# Patient Record
Sex: FEMALE | Employment: OTHER | URBAN - METROPOLITAN AREA
[De-identification: names, ages, dates, MRNs, and addresses within clinical notes are randomized per-mention and may not be internally consistent; named-entity substitution may affect disease eponyms.]

---

## 2018-10-16 ENCOUNTER — TELEPHONE (OUTPATIENT)
Dept: INFECTIOUS DISEASES | Age: 73
End: 2018-10-16

## 2018-10-16 PROBLEM — T88.51XA: Status: ACTIVE | Noted: 2017-04-12

## 2018-10-16 PROBLEM — S42.209A CLOSED FRACTURE OF PROXIMAL END OF HUMERUS: Status: ACTIVE | Noted: 2018-10-16

## 2018-10-16 PROBLEM — K57.30 DIVERTICULOSIS OF LARGE INTESTINE WITHOUT HEMORRHAGE: Status: ACTIVE | Noted: 2017-01-16

## 2018-10-16 PROBLEM — S21.119A LACERATION OF CHEST WALL: Status: ACTIVE | Noted: 2017-08-22

## 2018-10-16 PROBLEM — K57.92 DIVERTICULITIS: Status: ACTIVE | Noted: 2018-10-01

## 2018-10-16 PROBLEM — J95.821 ACUTE RESPIRATORY FAILURE FOLLOWING TRAUMA AND SURGERY (HCC): Status: ACTIVE | Noted: 2017-04-12

## 2018-10-16 PROBLEM — I34.0 NON-RHEUMATIC MITRAL REGURGITATION: Status: ACTIVE | Noted: 2017-03-07

## 2018-10-16 PROBLEM — I50.9 CHF (CONGESTIVE HEART FAILURE) (HCC): Status: ACTIVE | Noted: 2018-04-27

## 2018-10-16 PROBLEM — I34.0 MITRAL INCOMPETENCE: Status: ACTIVE | Noted: 2017-04-12

## 2018-10-17 ENCOUNTER — OFFICE VISIT (OUTPATIENT)
Dept: INFECTIOUS DISEASES | Age: 73
End: 2018-10-17
Payer: MEDICARE

## 2018-10-17 ENCOUNTER — TELEPHONE (OUTPATIENT)
Dept: INFECTIOUS DISEASES | Age: 73
End: 2018-10-17

## 2018-10-17 VITALS
WEIGHT: 275 LBS | SYSTOLIC BLOOD PRESSURE: 109 MMHG | HEART RATE: 72 BPM | OXYGEN SATURATION: 98 % | RESPIRATION RATE: 16 BRPM | DIASTOLIC BLOOD PRESSURE: 69 MMHG | BODY MASS INDEX: 48.73 KG/M2 | HEIGHT: 63 IN

## 2018-10-17 DIAGNOSIS — K57.20 COLONIC DIVERTICULAR ABSCESS: ICD-10-CM

## 2018-10-17 DIAGNOSIS — K57.92 DIVERTICULITIS: Primary | ICD-10-CM

## 2018-10-17 PROCEDURE — 99212 OFFICE O/P EST SF 10 MIN: CPT | Performed by: INTERNAL MEDICINE

## 2018-10-17 RX ORDER — CETIRIZINE HYDROCHLORIDE 10 MG/1
10 TABLET ORAL EVERY EVENING
COMMUNITY
Start: 2017-11-17

## 2018-10-17 RX ORDER — AMOXICILLIN AND CLAVULANATE POTASSIUM 500; 125 MG/1; MG/1
1 TABLET, FILM COATED ORAL
COMMUNITY
Start: 2018-10-15 | End: 2018-10-22

## 2018-10-17 RX ORDER — ATORVASTATIN CALCIUM 20 MG/1
10 TABLET, FILM COATED ORAL EVERY EVENING
COMMUNITY
Start: 2018-05-30

## 2018-10-17 RX ORDER — LEVOFLOXACIN 750 MG/1
750 TABLET ORAL
COMMUNITY
Start: 2018-10-17 | End: 2018-10-22

## 2018-10-17 RX ORDER — POLYETHYLENE GLYCOL 3350 17 G/17G
17 POWDER, FOR SOLUTION ORAL
Status: ON HOLD | COMMUNITY
End: 2021-03-01

## 2018-10-17 RX ORDER — AZELASTINE HYDROCHLORIDE, FLUTICASONE PROPIONATE 137; 50 UG/1; UG/1
1 SPRAY, METERED NASAL 2 TIMES DAILY PRN
COMMUNITY
Start: 2018-04-16

## 2018-10-17 RX ORDER — ALBUTEROL SULFATE 90 UG/1
2 AEROSOL, METERED RESPIRATORY (INHALATION) EVERY 4 HOURS PRN
COMMUNITY

## 2018-10-17 RX ORDER — ASPIRIN 325 MG
325 TABLET ORAL EVERY EVENING
COMMUNITY

## 2018-10-17 RX ORDER — BENZONATATE 200 MG/1
200 CAPSULE ORAL
COMMUNITY
End: 2021-02-05

## 2018-10-17 RX ORDER — RANITIDINE 150 MG/1
TABLET ORAL
COMMUNITY
Start: 2018-07-13 | End: 2021-02-05

## 2018-10-17 RX ORDER — SENNOSIDES 8.6 MG
650 CAPSULE ORAL EVERY 8 HOURS PRN
COMMUNITY

## 2018-10-17 RX ORDER — FUROSEMIDE 80 MG
80 TABLET ORAL
COMMUNITY
Start: 2017-10-04 | End: 2021-02-05

## 2018-10-17 RX ORDER — PANTOPRAZOLE SODIUM 40 MG/1
40 TABLET, DELAYED RELEASE ORAL DAILY
COMMUNITY
Start: 2018-10-05

## 2018-10-17 RX ORDER — BUPROPION HYDROCHLORIDE 150 MG/1
150 TABLET, EXTENDED RELEASE ORAL 2 TIMES DAILY
COMMUNITY
Start: 2018-07-30

## 2018-10-17 RX ORDER — AMOXICILLIN 250 MG
1 CAPSULE ORAL EVERY EVENING
COMMUNITY

## 2018-10-17 RX ORDER — SPIRONOLACTONE 25 MG/1
25 TABLET ORAL EVERY MORNING
COMMUNITY
Start: 2018-07-03

## 2018-10-17 RX ORDER — FLUCONAZOLE 200 MG/1
200 TABLET ORAL
COMMUNITY
Start: 2018-10-16 | End: 2018-10-22

## 2018-10-17 RX ORDER — IPRATROPIUM BROMIDE AND ALBUTEROL SULFATE 2.5; .5 MG/3ML; MG/3ML
3 SOLUTION RESPIRATORY (INHALATION) EVERY 6 HOURS PRN
COMMUNITY

## 2018-10-17 RX ORDER — SOLIFENACIN SUCCINATE 5 MG/1
5 TABLET, FILM COATED ORAL DAILY
COMMUNITY
Start: 2018-08-24

## 2018-10-17 RX ORDER — FLUTICASONE PROPIONATE 50 MCG
1 SPRAY, SUSPENSION (ML) NASAL 2 TIMES DAILY PRN
COMMUNITY
Start: 2017-07-12

## 2018-10-17 RX ORDER — NYSTATIN 100000 [USP'U]/G
POWDER TOPICAL 3 TIMES DAILY PRN
COMMUNITY
Start: 2018-05-21

## 2018-10-17 RX ORDER — AMOXICILLIN AND CLAVULANATE POTASSIUM 500; 125 MG/1; MG/1
1 TABLET, FILM COATED ORAL 2 TIMES DAILY
Qty: 20 TABLET | Refills: 0 | Status: SHIPPED | OUTPATIENT
Start: 2018-10-17 | End: 2018-10-27

## 2018-10-17 RX ORDER — ASCORBATE CALCIUM 500 MG
600 TABLET ORAL
Status: ON HOLD | COMMUNITY
End: 2021-03-01 | Stop reason: SDUPTHER

## 2018-10-17 RX ORDER — CITALOPRAM 20 MG/1
10 TABLET ORAL DAILY
COMMUNITY
Start: 2018-07-16

## 2018-10-17 RX ORDER — LEVOFLOXACIN 750 MG/1
750 TABLET ORAL EVERY OTHER DAY
Qty: 5 TABLET | Refills: 0 | Status: SHIPPED | OUTPATIENT
Start: 2018-10-17 | End: 2018-10-27

## 2018-10-17 RX ORDER — PHENOL 1.4 %
10 AEROSOL, SPRAY (ML) MUCOUS MEMBRANE NIGHTLY PRN
COMMUNITY

## 2018-10-17 NOTE — TELEPHONE ENCOUNTER
Paris Chung MD   0\"   10/17/2018 1:13 PM   Hi Dr. Kathryn Myles, Please call IR asap re: Floridalma Peterson and her drain.  Call at [phone] Thanks Rachel Acosta 10/17/2018 1:13 PM   0\"   10/17/2018 1:14 PM   Wrong number  0\"   10/17/2018 1:17 PM   [phone]  Read 10/17/2018 1:22 PM   0\"   10/17/2018 1:22 PM   Thx

## 2018-10-31 ENCOUNTER — TELEPHONE (OUTPATIENT)
Dept: INFECTIOUS DISEASES | Age: 73
End: 2018-10-31

## 2018-10-31 RX ORDER — LEVOFLOXACIN 750 MG/1
750 TABLET ORAL EVERY OTHER DAY
Qty: 4 TABLET | Refills: 0 | Status: SHIPPED | OUTPATIENT
Start: 2018-10-31 | End: 2018-11-07

## 2018-10-31 RX ORDER — AMOXICILLIN AND CLAVULANATE POTASSIUM 500; 125 MG/1; MG/1
1 TABLET, FILM COATED ORAL 2 TIMES DAILY
Qty: 14 TABLET | Refills: 0 | Status: SHIPPED | OUTPATIENT
Start: 2018-10-31 | End: 2018-11-07

## 2018-11-02 DIAGNOSIS — K57.92 DIVERTICULITIS: ICD-10-CM

## 2018-11-07 ENCOUNTER — OFFICE VISIT (OUTPATIENT)
Dept: INFECTIOUS DISEASES | Age: 73
End: 2018-11-07
Payer: MEDICARE

## 2018-11-07 VITALS
HEIGHT: 63 IN | WEIGHT: 254 LBS | TEMPERATURE: 97.1 F | BODY MASS INDEX: 45 KG/M2 | HEART RATE: 62 BPM | DIASTOLIC BLOOD PRESSURE: 74 MMHG | SYSTOLIC BLOOD PRESSURE: 121 MMHG

## 2018-11-07 DIAGNOSIS — K57.20 COLONIC DIVERTICULAR ABSCESS: Primary | ICD-10-CM

## 2018-11-07 DIAGNOSIS — K57.92 DIVERTICULITIS: ICD-10-CM

## 2018-11-07 PROCEDURE — 99213 OFFICE O/P EST LOW 20 MIN: CPT | Performed by: INTERNAL MEDICINE

## 2018-11-08 ENCOUNTER — TELEPHONE (OUTPATIENT)
Dept: INFECTIOUS DISEASES | Age: 73
End: 2018-11-08

## 2018-11-08 DIAGNOSIS — N17.9 AKI (ACUTE KIDNEY INJURY) (HCC): Primary | ICD-10-CM

## 2018-11-09 NOTE — TELEPHONE ENCOUNTER
Patient informed and she will go to the wellness center to have this test done. Order faxed to the [address] [place] [address].

## 2018-12-19 ENCOUNTER — OFFICE VISIT (OUTPATIENT)
Dept: INFECTIOUS DISEASES | Age: 73
End: 2018-12-19
Payer: MEDICARE

## 2018-12-19 VITALS
BODY MASS INDEX: 47.31 KG/M2 | HEART RATE: 60 BPM | RESPIRATION RATE: 16 BRPM | HEIGHT: 63 IN | OXYGEN SATURATION: 95 % | WEIGHT: 267 LBS | DIASTOLIC BLOOD PRESSURE: 61 MMHG | SYSTOLIC BLOOD PRESSURE: 122 MMHG | TEMPERATURE: 96.9 F

## 2018-12-19 DIAGNOSIS — N17.9 AKI (ACUTE KIDNEY INJURY) (HCC): ICD-10-CM

## 2018-12-19 DIAGNOSIS — K57.20 COLONIC DIVERTICULAR ABSCESS: Primary | ICD-10-CM

## 2018-12-19 DIAGNOSIS — K57.92 DIVERTICULITIS: ICD-10-CM

## 2018-12-19 PROCEDURE — 99212 OFFICE O/P EST SF 10 MIN: CPT | Performed by: INTERNAL MEDICINE

## 2018-12-19 NOTE — PROGRESS NOTES
Diverticulosis of large intestine without hemorrhage 1/16/2017    Encounter for mammogram to establish baseline mammogram 7/21/2016    Essential hypertension 6/15/2016    H/O hysterectomy for benign disease 3/3/2013    Heart murmur 7/21/2016    Hyperlipidemia 6/15/2016    Hypothermia due to anesthesia 4/12/2017    Laceration of chest wall 8/22/2017    Mitral incompetence 4/12/2017    Morbid obesity ([address] 6/15/2016    Non-rheumatic mitral regurgitation 3/7/2017    Polyp of sigmoid colon 6/15/2016    Postmenopausal status 6/15/2016    Routine general medical examination at a health care facility 7/21/2016    Special screening for malignant neoplasms, colon 7/21/2016    Special screening for malignant neoplasms, vagina 7/21/2016    Urinary incontinence 6/15/2016     History reviewed. No pertinent surgical history. Social History     Social History    Marital status:      Spouse name: N/A    Number of children: N/A    Years of education: N/A     Social History Main Topics    Smoking status: Never Smoker    Smokeless tobacco: Never Used    Alcohol use None    Drug use: Unknown    Sexual activity: Not Asked     Other Topics Concern    None     Social History Narrative    None     History reviewed. No pertinent family history.     Current med     Current Outpatient Prescriptions:     acetaminophen (TYLENOL) 650 MG extended release tablet, Take 650 mg by mouth, Disp: , Rfl:     albuterol sulfate  (90 Base) MCG/ACT inhaler, Inhale 2 puffs into the lungs, Disp: , Rfl:     Calcium Ascorbate 500 MG TABS, Take 500 mg by mouth, Disp: , Rfl:     aspirin 325 MG tablet, Take 325 mg by mouth, Disp: , Rfl:     atorvastatin (LIPITOR) 20 MG tablet, Take 20 mg by mouth, Disp: , Rfl:     Azelastine-Fluticasone 137-50 MCG/ACT SUSP, 1 spray by Nasal route, Disp: , Rfl:     benzonatate (TESSALON) 200 MG capsule, Take 200 mg by mouth, Disp: , Rfl:     buPROPion (WELLBUTRIN SR) 150 MG extended

## 2018-12-20 ENCOUNTER — TELEPHONE (OUTPATIENT)
Dept: INFECTIOUS DISEASES | Age: 73
End: 2018-12-20

## 2019-02-13 ENCOUNTER — OFFICE VISIT (OUTPATIENT)
Dept: INFECTIOUS DISEASES | Age: 74
End: 2019-02-13
Payer: MEDICARE

## 2019-02-13 VITALS
TEMPERATURE: 97 F | DIASTOLIC BLOOD PRESSURE: 70 MMHG | BODY MASS INDEX: 49.61 KG/M2 | HEIGHT: 63 IN | SYSTOLIC BLOOD PRESSURE: 130 MMHG | WEIGHT: 280 LBS | HEART RATE: 68 BPM

## 2019-02-13 DIAGNOSIS — K57.92 DIVERTICULITIS: Primary | ICD-10-CM

## 2019-02-13 PROCEDURE — 99212 OFFICE O/P EST SF 10 MIN: CPT | Performed by: INTERNAL MEDICINE

## 2019-02-15 DIAGNOSIS — K57.20 COLONIC DIVERTICULAR ABSCESS: ICD-10-CM

## 2021-02-05 ENCOUNTER — HOSPITAL ENCOUNTER (OUTPATIENT)
Dept: PREADMISSION TESTING | Age: 76
Discharge: HOME | End: 2021-02-09
Payer: MEDICARE

## 2021-02-05 VITALS
RESPIRATION RATE: 18 BRPM | HEIGHT: 62 IN | OXYGEN SATURATION: 97 % | HEART RATE: 74 BPM | DIASTOLIC BLOOD PRESSURE: 73 MMHG | SYSTOLIC BLOOD PRESSURE: 125 MMHG | TEMPERATURE: 96.5 F | WEIGHT: 293 LBS | BODY MASS INDEX: 53.92 KG/M2

## 2021-02-05 DIAGNOSIS — Z01.811 PRE-OP CHEST EXAM: Primary | ICD-10-CM

## 2021-02-05 PROCEDURE — 93005 ELECTROCARDIOGRAM TRACING: CPT | Performed by: PSYCHIATRY & NEUROLOGY

## 2021-02-05 RX ORDER — MAGNESIUM GLUCONATE 27 MG(500)
500 TABLET ORAL 2 TIMES DAILY
Status: ON HOLD | COMMUNITY
End: 2021-03-01

## 2021-02-05 RX ORDER — TORSEMIDE 20 MG/1
60 TABLET ORAL DAILY
Status: ON HOLD | COMMUNITY
End: 2021-03-12 | Stop reason: HOSPADM

## 2021-02-05 RX ORDER — AMIODARONE HYDROCHLORIDE 200 MG/1
100 TABLET ORAL
Status: ON HOLD | COMMUNITY
End: 2021-03-12 | Stop reason: HOSPADM

## 2021-02-05 NOTE — PRE-PROCEDURE INSTRUCTIONS
ARRIVE AT [facility] ON Thursday, 2/25/2021 at 0830 AM    Please call [phone] when you arrive at the main entrance. Please see visitation restrictions handout that was given to you. Continue to take your home medications as you normally do up to and including the night before surgery with the exception of any blood thinning medications. Please stop any blood thinning medications as directed by your surgeon or prescribing physician. Failure to stop certain medications may interfere with your scheduled surgery. These may include:  Aspirin, Warfarin (Coumadin), Clopidogrel (Plavix), Ibuprofen (Motrin, Advil), Naproxen (Aleve), Meloxicam (Mobic), Celecoxib (Celebrex), Eliquis, Pradaxa, Xarelto, Effient, Fish Oil, Herbal supplements. Please take the following medication(s) the day of surgery with a small sip of water:  Amiodarone, Inhalers, Tylenol, Metoprolol, Protonix, Nasal spray  Zyrtec    Please use your inhalers at home the day of surgery. PREPARING FOR YOUR SURGERY:     Before surgery, you can play an important role in your own health. Because skin is not sterile, we need to be sure that your skin is as free of germs as possible before surgery by carefully washing before surgery. Preparing or prepping skin before surgery can reduce the risk of a surgical site infection.   Do not shave the area of your body where your surgery will be performed unless you received specific permission from your physician. You will need to shower at home the night before surgery and the morning of surgery with a special soap called chlorhexidine gluconate (CHG*). *Not to be used by people allergic to Chlorhexidine Gluconate (CHG). Following these instructions will help you be sure that your skin is clean before surgery. Instructions on cleaning your skin before surgery: The night before your surgery:      You will need to shower with warm water (not hot) and the CHG soap.        Use a clean wash cloth and a clean towel. Have clean clothes available to put on after the shower.    First wash your hair with regular shampoo. Rinse your hair and body thoroughly to remove the shampoo.  Wash your face and genital area (private parts) with your regular soap or water only. Thoroughly rinse your body with warm water from the neck down.  Turn water off to prevent rinsing the soap off too soon.  With a clean wet washcloth and half of the CHG soap in the bottle, lather your entire body from the neck down. Do not use CHG soap near your eyes or ears to avoid injury to those areas.  Wash thoroughly, paying special attention to the area where your surgery will be performed.  Wash your body gently for five (5) minutes. Avoid scrubbing your skin too hard.  Turn the water back on and rinse your body thoroughly.  Pat yourself dry with a clean, soft towel. Do not apply lotion, cream or powder.  Dress with clean freshly washed clothes. The morning of surgery:     Repeat shower following steps above - using remaining half of CHG soap in bottle. Patient Instructions:    Antony Beltran If you are having any type of anesthesia you are to have nothing to eat or drink after midnight the night before your surgery. This includes gum, hard candy, mints, water or smoking or chewing tobacco.  The only exception to this is a small sip of water to take with any morning dose of heart, blood pressure, or seizure medications. No alcoholic beverages for 24 hours prior to surgery.  Brush your teeth but do not swallow water.  Bring your eyeglasses and case with you. No contacts are to be worn the day of surgery. You also may bring your hearing aids. Most surgical procedures involving anesthesia will require that you remove your dentures prior to surgery.      If you are on C-PAP or Bi-PAP at home and plan on staying in the hospital overnight for your surgery please bring the machine with you. · Do not wear any jewelry or body piercings day of surgery. Also, NO lotion, perfume or deodorant to be used the day of surgery. No nail polish on the operative extremity (arm/leg surgeries)    · Do not bring any valuables such as jewelry, cash, or credit cards. If you are staying overnight with us, please bring a small bag of personal items.  Please wear loose, comfortable clothing. If you are potentially going to have a cast or brace bring clothing that will fit over them.  In case of illness  If you have cold or flu like symptoms (high fever, runny nose, sore throat, cough, etc.) rash, nausea, vomiting, loose stools, and/or recent contact with someone who has a contagious disease (chicken pox, measles, etc.) Please call your doctor before coming to the hospital.     If your child is having surgery please make arrangements for any other children to be cared for at home on the day of surgery. Other children are not permitted in recovery room and we want you to be able to spend time with the patient. If other arrangements are not available then we suggest that you have a second adult to stay in the waiting room. Day of Surgery/Procedure:    As a patient at [facility] you can expect quality medical and nursing care that is centered on your individual needs. Our goal is to make your surgical experience as comfortable as possible    . Transportation After Your Surgery/Procedure: You will need a friend or family member to drive you home after your procedure. Your  must be 25years of age or older and able to sign off on your discharge instructions. A taxi cab or any other form of public transportation is not acceptable. Your friend or family member must stay at the hospital throughout your procedure.     Someone must remain with you for the first 24 hours after your surgery if you receive anesthesia or medication. If you do not have someone to stay with you, your procedure may be cancelled.       If you have any other questions regarding your procedure or the day of surgery, please call [phone]      _________________________  ____________________________  Signature (Patient)              Signature (Provider) & date

## 2021-02-06 LAB
EKG ATRIAL RATE: 68 BPM
EKG P AXIS: 73 DEGREES
EKG P-R INTERVAL: 186 MS
EKG Q-T INTERVAL: 414 MS
EKG QRS DURATION: 96 MS
EKG QTC CALCULATION (BAZETT): 440 MS
EKG R AXIS: 2 DEGREES
EKG T AXIS: 57 DEGREES
EKG VENTRICULAR RATE: 68 BPM

## 2021-02-06 PROCEDURE — 93010 ELECTROCARDIOGRAM REPORT: CPT | Performed by: INTERNAL MEDICINE

## 2021-02-21 ENCOUNTER — HOSPITAL ENCOUNTER (OUTPATIENT)
Dept: LAB | Age: 76
Setting detail: SPECIMEN
Discharge: HOME | End: 2021-02-21
Payer: MEDICARE

## 2021-02-21 DIAGNOSIS — Z01.818 PREOP TESTING: Primary | ICD-10-CM

## 2021-02-21 PROCEDURE — U0005 INFEC AGEN DETEC AMPLI PROBE: HCPCS

## 2021-02-21 PROCEDURE — U0003 INFECTIOUS AGENT DETECTION BY NUCLEIC ACID (DNA OR RNA); SEVERE ACUTE RESPIRATORY SYNDROME CORONAVIRUS 2 (SARS-COV-2) (CORONAVIRUS DISEASE [COVID-19]), AMPLIFIED PROBE TECHNIQUE, MAKING USE OF HIGH THROUGHPUT TECHNOLOGIES AS DESCRIBED BY CMS-2020-01-R: HCPCS

## 2021-02-22 LAB
SARS-COV-2: NORMAL
SARS-COV-2: NOT DETECTED
SOURCE: NORMAL

## 2021-02-23 ENCOUNTER — TELEPHONE (OUTPATIENT)
Dept: PRIMARY CARE CLINIC | Age: 76
End: 2021-02-23

## 2021-02-24 ENCOUNTER — ANESTHESIA EVENT (OUTPATIENT)
Dept: OPERATING ROOM | Age: 76
DRG: 981 | End: 2021-02-24
Payer: MEDICARE

## 2021-02-25 ENCOUNTER — ANESTHESIA (OUTPATIENT)
Dept: OPERATING ROOM | Age: 76
DRG: 981 | End: 2021-02-25
Payer: MEDICARE

## 2021-02-25 ENCOUNTER — HOSPITAL ENCOUNTER (INPATIENT)
Age: 76
LOS: 15 days | Discharge: NURSING, REHAB OR LONG-TERM CARE | DRG: 981 | End: 2021-03-12
Attending: COLON & RECTAL SURGERY | Admitting: COLON & RECTAL SURGERY
Payer: MEDICARE

## 2021-02-25 VITALS — DIASTOLIC BLOOD PRESSURE: 71 MMHG | TEMPERATURE: 56.3 F | SYSTOLIC BLOOD PRESSURE: 166 MMHG | OXYGEN SATURATION: 100 %

## 2021-02-25 PROBLEM — Z90.49 S/P COLECTOMY: Status: ACTIVE | Noted: 2021-02-25

## 2021-02-25 LAB
GLUCOSE BLD-MCNC: 103 MG/DL (ref 65–105)
GLUCOSE BLD-MCNC: 150 MG/DL (ref 65–105)

## 2021-02-25 PROCEDURE — 3700000000 HC ANESTHESIA ATTENDED CARE: Performed by: COLON & RECTAL SURGERY

## 2021-02-25 PROCEDURE — 6370000000 HC RX 637 (ALT 250 FOR IP): Performed by: STUDENT IN AN ORGANIZED HEALTH CARE EDUCATION/TRAINING PROGRAM

## 2021-02-25 PROCEDURE — 0DNN8ZZ RELEASE SIGMOID COLON, VIA NATURAL OR ARTIFICIAL OPENING ENDOSCOPIC: ICD-10-PCS | Performed by: COLON & RECTAL SURGERY

## 2021-02-25 PROCEDURE — 94761 N-INVAS EAR/PLS OXIMETRY MLT: CPT

## 2021-02-25 PROCEDURE — 3600000002 HC SURGERY LEVEL 2 BASE: Performed by: COLON & RECTAL SURGERY

## 2021-02-25 PROCEDURE — 2500000003 HC RX 250 WO HCPCS: Performed by: NURSE ANESTHETIST, CERTIFIED REGISTERED

## 2021-02-25 PROCEDURE — 2580000003 HC RX 258: Performed by: NURSE ANESTHETIST, CERTIFIED REGISTERED

## 2021-02-25 PROCEDURE — 2709999900 HC NON-CHARGEABLE SUPPLY: Performed by: COLON & RECTAL SURGERY

## 2021-02-25 PROCEDURE — 3700000001 HC ADD 15 MINUTES (ANESTHESIA): Performed by: COLON & RECTAL SURGERY

## 2021-02-25 PROCEDURE — 6360000002 HC RX W HCPCS: Performed by: ANESTHESIOLOGY

## 2021-02-25 PROCEDURE — 0D1N0Z4 BYPASS SIGMOID COLON TO CUTANEOUS, OPEN APPROACH: ICD-10-PCS | Performed by: COLON & RECTAL SURGERY

## 2021-02-25 PROCEDURE — 2580000003 HC RX 258: Performed by: ANESTHESIOLOGY

## 2021-02-25 PROCEDURE — 0DBN8ZZ EXCISION OF SIGMOID COLON, VIA NATURAL OR ARTIFICIAL OPENING ENDOSCOPIC: ICD-10-PCS | Performed by: COLON & RECTAL SURGERY

## 2021-02-25 PROCEDURE — 82947 ASSAY GLUCOSE BLOOD QUANT: CPT

## 2021-02-25 PROCEDURE — 7100000000 HC PACU RECOVERY - FIRST 15 MIN: Performed by: COLON & RECTAL SURGERY

## 2021-02-25 PROCEDURE — 6360000002 HC RX W HCPCS: Performed by: NURSE ANESTHETIST, CERTIFIED REGISTERED

## 2021-02-25 PROCEDURE — 2580000003 HC RX 258: Performed by: STUDENT IN AN ORGANIZED HEALTH CARE EDUCATION/TRAINING PROGRAM

## 2021-02-25 PROCEDURE — 94640 AIRWAY INHALATION TREATMENT: CPT

## 2021-02-25 PROCEDURE — 3600000012 HC SURGERY LEVEL 2 ADDTL 15MIN: Performed by: COLON & RECTAL SURGERY

## 2021-02-25 PROCEDURE — 88307 TISSUE EXAM BY PATHOLOGIST: CPT

## 2021-02-25 PROCEDURE — 2500000003 HC RX 250 WO HCPCS

## 2021-02-25 PROCEDURE — 2000000000 HC ICU R&B

## 2021-02-25 PROCEDURE — 7100000001 HC PACU RECOVERY - ADDTL 15 MIN: Performed by: COLON & RECTAL SURGERY

## 2021-02-25 PROCEDURE — 2720000010 HC SURG SUPPLY STERILE: Performed by: COLON & RECTAL SURGERY

## 2021-02-25 PROCEDURE — 2700000000 HC OXYGEN THERAPY PER DAY

## 2021-02-25 PROCEDURE — 6360000002 HC RX W HCPCS: Performed by: STUDENT IN AN ORGANIZED HEALTH CARE EDUCATION/TRAINING PROGRAM

## 2021-02-25 PROCEDURE — 99211 OFF/OP EST MAY X REQ PHY/QHP: CPT

## 2021-02-25 RX ORDER — SODIUM CHLORIDE, SODIUM LACTATE, POTASSIUM CHLORIDE, CALCIUM CHLORIDE 600; 310; 30; 20 MG/100ML; MG/100ML; MG/100ML; MG/100ML
INJECTION, SOLUTION INTRAVENOUS CONTINUOUS PRN
Status: DISCONTINUED | OUTPATIENT
Start: 2021-02-25 | End: 2021-02-25 | Stop reason: SDUPTHER

## 2021-02-25 RX ORDER — MORPHINE SULFATE 2 MG/ML
2 INJECTION, SOLUTION INTRAMUSCULAR; INTRAVENOUS EVERY 5 MIN PRN
Status: DISCONTINUED | OUTPATIENT
Start: 2021-02-25 | End: 2021-02-25 | Stop reason: HOSPADM

## 2021-02-25 RX ORDER — OXYCODONE HYDROCHLORIDE AND ACETAMINOPHEN 5; 325 MG/1; MG/1
2 TABLET ORAL PRN
Status: DISCONTINUED | OUTPATIENT
Start: 2021-02-25 | End: 2021-02-25 | Stop reason: HOSPADM

## 2021-02-25 RX ORDER — SODIUM CHLORIDE 0.9 % (FLUSH) 0.9 %
10 SYRINGE (ML) INJECTION PRN
Status: DISCONTINUED | OUTPATIENT
Start: 2021-02-25 | End: 2021-02-25 | Stop reason: HOSPADM

## 2021-02-25 RX ORDER — ALBUTEROL SULFATE 2.5 MG/3ML
2.5 SOLUTION RESPIRATORY (INHALATION)
Status: DISCONTINUED | OUTPATIENT
Start: 2021-02-25 | End: 2021-03-08

## 2021-02-25 RX ORDER — GLYCOPYRROLATE 1 MG/5 ML
SYRINGE (ML) INTRAVENOUS PRN
Status: DISCONTINUED | OUTPATIENT
Start: 2021-02-25 | End: 2021-02-25 | Stop reason: SDUPTHER

## 2021-02-25 RX ORDER — NEOSTIGMINE METHYLSULFATE 5 MG/5 ML
SYRINGE (ML) INTRAVENOUS PRN
Status: DISCONTINUED | OUTPATIENT
Start: 2021-02-25 | End: 2021-02-25 | Stop reason: SDUPTHER

## 2021-02-25 RX ORDER — HYDROMORPHONE HCL 110MG/55ML
0.5 PATIENT CONTROLLED ANALGESIA SYRINGE INTRAVENOUS EVERY 5 MIN PRN
Status: DISCONTINUED | OUTPATIENT
Start: 2021-02-25 | End: 2021-02-25 | Stop reason: HOSPADM

## 2021-02-25 RX ORDER — FENTANYL CITRATE 50 UG/ML
INJECTION, SOLUTION INTRAMUSCULAR; INTRAVENOUS PRN
Status: DISCONTINUED | OUTPATIENT
Start: 2021-02-25 | End: 2021-02-25 | Stop reason: SDUPTHER

## 2021-02-25 RX ORDER — CITALOPRAM 10 MG/1
10 TABLET ORAL DAILY
Status: DISCONTINUED | OUTPATIENT
Start: 2021-02-26 | End: 2021-03-12 | Stop reason: HOSPADM

## 2021-02-25 RX ORDER — LIDOCAINE HYDROCHLORIDE 10 MG/ML
1 INJECTION, SOLUTION EPIDURAL; INFILTRATION; INTRACAUDAL; PERINEURAL
Status: DISCONTINUED | OUTPATIENT
Start: 2021-02-26 | End: 2021-02-25 | Stop reason: HOSPADM

## 2021-02-25 RX ORDER — ATORVASTATIN CALCIUM 20 MG/1
20 TABLET, FILM COATED ORAL DAILY
Status: DISCONTINUED | OUTPATIENT
Start: 2021-02-25 | End: 2021-03-12 | Stop reason: HOSPADM

## 2021-02-25 RX ORDER — HYDROMORPHONE HCL 110MG/55ML
0.25 PATIENT CONTROLLED ANALGESIA SYRINGE INTRAVENOUS EVERY 5 MIN PRN
Status: DISCONTINUED | OUTPATIENT
Start: 2021-02-25 | End: 2021-02-25 | Stop reason: HOSPADM

## 2021-02-25 RX ORDER — TORSEMIDE 20 MG/1
20 TABLET ORAL DAILY
Status: DISCONTINUED | OUTPATIENT
Start: 2021-02-26 | End: 2021-03-12 | Stop reason: HOSPADM

## 2021-02-25 RX ORDER — PROPOFOL 10 MG/ML
INJECTION, EMULSION INTRAVENOUS PRN
Status: DISCONTINUED | OUTPATIENT
Start: 2021-02-25 | End: 2021-02-25 | Stop reason: SDUPTHER

## 2021-02-25 RX ORDER — LIDOCAINE HYDROCHLORIDE 20 MG/ML
INJECTION, SOLUTION EPIDURAL; INFILTRATION; INTRACAUDAL; PERINEURAL PRN
Status: DISCONTINUED | OUTPATIENT
Start: 2021-02-25 | End: 2021-02-25 | Stop reason: SDUPTHER

## 2021-02-25 RX ORDER — SODIUM CHLORIDE, SODIUM LACTATE, POTASSIUM CHLORIDE, CALCIUM CHLORIDE 600; 310; 30; 20 MG/100ML; MG/100ML; MG/100ML; MG/100ML
INJECTION, SOLUTION INTRAVENOUS CONTINUOUS
Status: DISCONTINUED | OUTPATIENT
Start: 2021-02-25 | End: 2021-02-26

## 2021-02-25 RX ORDER — DIPHENHYDRAMINE HYDROCHLORIDE 50 MG/ML
12.5 INJECTION INTRAMUSCULAR; INTRAVENOUS
Status: DISCONTINUED | OUTPATIENT
Start: 2021-02-25 | End: 2021-02-25 | Stop reason: HOSPADM

## 2021-02-25 RX ORDER — HYDROMORPHONE HCL 110MG/55ML
0.5 PATIENT CONTROLLED ANALGESIA SYRINGE INTRAVENOUS EVERY 5 MIN PRN
Status: COMPLETED | OUTPATIENT
Start: 2021-02-25 | End: 2021-02-25

## 2021-02-25 RX ORDER — MEPERIDINE HYDROCHLORIDE 50 MG/ML
12.5 INJECTION INTRAMUSCULAR; INTRAVENOUS; SUBCUTANEOUS EVERY 5 MIN PRN
Status: DISCONTINUED | OUTPATIENT
Start: 2021-02-25 | End: 2021-02-25 | Stop reason: HOSPADM

## 2021-02-25 RX ORDER — SODIUM CHLORIDE, SODIUM LACTATE, POTASSIUM CHLORIDE, CALCIUM CHLORIDE 600; 310; 30; 20 MG/100ML; MG/100ML; MG/100ML; MG/100ML
INJECTION, SOLUTION INTRAVENOUS CONTINUOUS
Status: DISCONTINUED | OUTPATIENT
Start: 2021-02-26 | End: 2021-02-25

## 2021-02-25 RX ORDER — 0.9 % SODIUM CHLORIDE 0.9 %
500 INTRAVENOUS SOLUTION INTRAVENOUS
Status: DISCONTINUED | OUTPATIENT
Start: 2021-02-25 | End: 2021-02-25 | Stop reason: HOSPADM

## 2021-02-25 RX ORDER — SODIUM CHLORIDE 0.9 % (FLUSH) 0.9 %
10 SYRINGE (ML) INJECTION PRN
Status: DISCONTINUED | OUTPATIENT
Start: 2021-02-25 | End: 2021-03-12 | Stop reason: HOSPADM

## 2021-02-25 RX ORDER — CLINDAMYCIN PHOSPHATE 900 MG/50ML
INJECTION INTRAVENOUS PRN
Status: DISCONTINUED | OUTPATIENT
Start: 2021-02-25 | End: 2021-02-25 | Stop reason: SDUPTHER

## 2021-02-25 RX ORDER — SODIUM CHLORIDE 0.9 % (FLUSH) 0.9 %
10 SYRINGE (ML) INJECTION EVERY 12 HOURS SCHEDULED
Status: DISCONTINUED | OUTPATIENT
Start: 2021-02-25 | End: 2021-03-12 | Stop reason: HOSPADM

## 2021-02-25 RX ORDER — OXYCODONE HYDROCHLORIDE 5 MG/1
5 TABLET ORAL EVERY 4 HOURS PRN
Status: DISCONTINUED | OUTPATIENT
Start: 2021-02-25 | End: 2021-03-01

## 2021-02-25 RX ORDER — ONDANSETRON 2 MG/ML
4 INJECTION INTRAMUSCULAR; INTRAVENOUS
Status: DISCONTINUED | OUTPATIENT
Start: 2021-02-25 | End: 2021-02-25 | Stop reason: HOSPADM

## 2021-02-25 RX ORDER — ONDANSETRON 2 MG/ML
4 INJECTION INTRAMUSCULAR; INTRAVENOUS EVERY 6 HOURS PRN
Status: DISCONTINUED | OUTPATIENT
Start: 2021-02-25 | End: 2021-03-12 | Stop reason: HOSPADM

## 2021-02-25 RX ORDER — MORPHINE SULFATE 2 MG/ML
2 INJECTION, SOLUTION INTRAMUSCULAR; INTRAVENOUS EVERY 4 HOURS PRN
Status: DISCONTINUED | OUTPATIENT
Start: 2021-02-25 | End: 2021-02-28

## 2021-02-25 RX ORDER — SPIRONOLACTONE 25 MG/1
25 TABLET ORAL DAILY
Status: DISCONTINUED | OUTPATIENT
Start: 2021-02-26 | End: 2021-03-12 | Stop reason: HOSPADM

## 2021-02-25 RX ORDER — SODIUM CHLORIDE 9 MG/ML
INJECTION, SOLUTION INTRAVENOUS CONTINUOUS
Status: DISCONTINUED | OUTPATIENT
Start: 2021-02-26 | End: 2021-02-25

## 2021-02-25 RX ORDER — PROMETHAZINE HYDROCHLORIDE 25 MG/ML
12.5 INJECTION, SOLUTION INTRAMUSCULAR; INTRAVENOUS
Status: DISCONTINUED | OUTPATIENT
Start: 2021-02-25 | End: 2021-02-25 | Stop reason: HOSPADM

## 2021-02-25 RX ORDER — BUPROPION HYDROCHLORIDE 150 MG/1
150 TABLET, EXTENDED RELEASE ORAL DAILY
Status: DISCONTINUED | OUTPATIENT
Start: 2021-02-26 | End: 2021-03-12 | Stop reason: HOSPADM

## 2021-02-25 RX ORDER — IBUPROFEN 200 MG
400 TABLET ORAL EVERY 6 HOURS SCHEDULED
Status: DISCONTINUED | OUTPATIENT
Start: 2021-02-25 | End: 2021-03-01

## 2021-02-25 RX ORDER — SODIUM CHLORIDE 0.9 % (FLUSH) 0.9 %
10 SYRINGE (ML) INJECTION EVERY 12 HOURS SCHEDULED
Status: DISCONTINUED | OUTPATIENT
Start: 2021-02-25 | End: 2021-02-25 | Stop reason: HOSPADM

## 2021-02-25 RX ORDER — LABETALOL HYDROCHLORIDE 5 MG/ML
5 INJECTION, SOLUTION INTRAVENOUS EVERY 10 MIN PRN
Status: DISCONTINUED | OUTPATIENT
Start: 2021-02-25 | End: 2021-02-25 | Stop reason: HOSPADM

## 2021-02-25 RX ORDER — DEXAMETHASONE SODIUM PHOSPHATE 10 MG/ML
INJECTION, SOLUTION INTRAMUSCULAR; INTRAVENOUS PRN
Status: DISCONTINUED | OUTPATIENT
Start: 2021-02-25 | End: 2021-02-25 | Stop reason: SDUPTHER

## 2021-02-25 RX ORDER — ROCURONIUM BROMIDE 10 MG/ML
INJECTION, SOLUTION INTRAVENOUS PRN
Status: DISCONTINUED | OUTPATIENT
Start: 2021-02-25 | End: 2021-02-25 | Stop reason: SDUPTHER

## 2021-02-25 RX ORDER — ACETAMINOPHEN 500 MG
1000 TABLET ORAL EVERY 8 HOURS
Status: DISCONTINUED | OUTPATIENT
Start: 2021-02-25 | End: 2021-03-01

## 2021-02-25 RX ORDER — PANTOPRAZOLE SODIUM 40 MG/1
40 TABLET, DELAYED RELEASE ORAL
Status: DISCONTINUED | OUTPATIENT
Start: 2021-02-26 | End: 2021-02-28

## 2021-02-25 RX ORDER — IPRATROPIUM BROMIDE AND ALBUTEROL SULFATE 2.5; .5 MG/3ML; MG/3ML
1 SOLUTION RESPIRATORY (INHALATION) EVERY 4 HOURS PRN
Status: DISCONTINUED | OUTPATIENT
Start: 2021-02-25 | End: 2021-02-25

## 2021-02-25 RX ORDER — IPRATROPIUM BROMIDE AND ALBUTEROL SULFATE 2.5; .5 MG/3ML; MG/3ML
1 SOLUTION RESPIRATORY (INHALATION) 3 TIMES DAILY
Status: DISCONTINUED | OUTPATIENT
Start: 2021-02-25 | End: 2021-03-01

## 2021-02-25 RX ORDER — OXYCODONE HYDROCHLORIDE AND ACETAMINOPHEN 5; 325 MG/1; MG/1
1 TABLET ORAL PRN
Status: DISCONTINUED | OUTPATIENT
Start: 2021-02-25 | End: 2021-02-25 | Stop reason: HOSPADM

## 2021-02-25 RX ADMIN — IBUPROFEN 400 MG: 200 TABLET, FILM COATED ORAL at 16:29

## 2021-02-25 RX ADMIN — SODIUM CHLORIDE, POTASSIUM CHLORIDE, SODIUM LACTATE AND CALCIUM CHLORIDE: 600; 310; 30; 20 INJECTION, SOLUTION INTRAVENOUS at 07:04

## 2021-02-25 RX ADMIN — LIDOCAINE HYDROCHLORIDE 40 MG: 20 INJECTION, SOLUTION EPIDURAL; INFILTRATION; INTRACAUDAL; PERINEURAL at 08:01

## 2021-02-25 RX ADMIN — ROCURONIUM BROMIDE 50 MG: 10 INJECTION, SOLUTION INTRAVENOUS at 08:01

## 2021-02-25 RX ADMIN — Medication 1 MG: at 10:48

## 2021-02-25 RX ADMIN — FENTANYL CITRATE 50 MCG: 50 INJECTION, SOLUTION INTRAMUSCULAR; INTRAVENOUS at 11:20

## 2021-02-25 RX ADMIN — FENTANYL CITRATE 100 MCG: 50 INJECTION, SOLUTION INTRAMUSCULAR; INTRAVENOUS at 08:45

## 2021-02-25 RX ADMIN — ACETAMINOPHEN 1000 MG: 500 TABLET, COATED ORAL at 23:22

## 2021-02-25 RX ADMIN — Medication 5 MG: at 10:47

## 2021-02-25 RX ADMIN — MORPHINE SULFATE 2 MG: 2 INJECTION, SOLUTION INTRAMUSCULAR; INTRAVENOUS at 14:26

## 2021-02-25 RX ADMIN — PROPOFOL 30 MG: 10 INJECTION, EMULSION INTRAVENOUS at 07:42

## 2021-02-25 RX ADMIN — METRONIDAZOLE 500 MG: 500 INJECTION, SOLUTION INTRAVENOUS at 08:07

## 2021-02-25 RX ADMIN — PROPOFOL 50 MG: 10 INJECTION, EMULSION INTRAVENOUS at 08:48

## 2021-02-25 RX ADMIN — SODIUM CHLORIDE, POTASSIUM CHLORIDE, SODIUM LACTATE AND CALCIUM CHLORIDE: 600; 310; 30; 20 INJECTION, SOLUTION INTRAVENOUS at 07:31

## 2021-02-25 RX ADMIN — ATORVASTATIN CALCIUM 20 MG: 20 TABLET, FILM COATED ORAL at 16:25

## 2021-02-25 RX ADMIN — HYDROMORPHONE HYDROCHLORIDE 0.5 MG: 2 INJECTION INTRAMUSCULAR; INTRAVENOUS; SUBCUTANEOUS at 12:58

## 2021-02-25 RX ADMIN — DEXAMETHASONE SODIUM PHOSPHATE 10 MG: 10 INJECTION INTRAMUSCULAR; INTRAVENOUS at 08:44

## 2021-02-25 RX ADMIN — PROPOFOL 150 MG: 10 INJECTION, EMULSION INTRAVENOUS at 08:01

## 2021-02-25 RX ADMIN — ROCURONIUM BROMIDE 10 MG: 10 INJECTION, SOLUTION INTRAVENOUS at 09:28

## 2021-02-25 RX ADMIN — FENTANYL CITRATE 50 MCG: 50 INJECTION, SOLUTION INTRAMUSCULAR; INTRAVENOUS at 08:34

## 2021-02-25 RX ADMIN — SODIUM CHLORIDE, PRESERVATIVE FREE 10 ML: 5 INJECTION INTRAVENOUS at 20:01

## 2021-02-25 RX ADMIN — FENTANYL CITRATE 100 MCG: 50 INJECTION, SOLUTION INTRAMUSCULAR; INTRAVENOUS at 08:01

## 2021-02-25 RX ADMIN — LIDOCAINE HYDROCHLORIDE 60 MG: 20 INJECTION, SOLUTION EPIDURAL; INFILTRATION; INTRACAUDAL; PERINEURAL at 07:36

## 2021-02-25 RX ADMIN — IPRATROPIUM BROMIDE AND ALBUTEROL SULFATE 1 AMPULE: .5; 3 SOLUTION RESPIRATORY (INHALATION) at 19:47

## 2021-02-25 RX ADMIN — IPRATROPIUM BROMIDE AND ALBUTEROL SULFATE 1 AMPULE: .5; 3 SOLUTION RESPIRATORY (INHALATION) at 17:00

## 2021-02-25 RX ADMIN — SUGAMMADEX 200 MG: 100 INJECTION, SOLUTION INTRAVENOUS at 11:15

## 2021-02-25 RX ADMIN — PROPOFOL 70 MG: 10 INJECTION, EMULSION INTRAVENOUS at 07:36

## 2021-02-25 RX ADMIN — IBUPROFEN 400 MG: 200 TABLET, FILM COATED ORAL at 22:00

## 2021-02-25 RX ADMIN — SODIUM CHLORIDE, POTASSIUM CHLORIDE, SODIUM LACTATE AND CALCIUM CHLORIDE: 600; 310; 30; 20 INJECTION, SOLUTION INTRAVENOUS at 08:09

## 2021-02-25 RX ADMIN — ROCURONIUM BROMIDE 20 MG: 10 INJECTION, SOLUTION INTRAVENOUS at 08:45

## 2021-02-25 RX ADMIN — PROPOFOL 30 MG: 10 INJECTION, EMULSION INTRAVENOUS at 07:39

## 2021-02-25 RX ADMIN — SODIUM CHLORIDE, POTASSIUM CHLORIDE, SODIUM LACTATE AND CALCIUM CHLORIDE: 600; 310; 30; 20 INJECTION, SOLUTION INTRAVENOUS at 14:30

## 2021-02-25 RX ADMIN — CLINDAMYCIN PHOSPHATE 900 MG: 18 INJECTION, SOLUTION INTRAMUSCULAR; INTRAVENOUS at 08:22

## 2021-02-25 RX ADMIN — ACETAMINOPHEN 1000 MG: 500 TABLET, COATED ORAL at 16:25

## 2021-02-25 RX ADMIN — HYDROMORPHONE HYDROCHLORIDE 0.5 MG: 2 INJECTION INTRAMUSCULAR; INTRAVENOUS; SUBCUTANEOUS at 11:57

## 2021-02-25 RX ADMIN — HYDROMORPHONE HYDROCHLORIDE 0.5 MG: 2 INJECTION INTRAMUSCULAR; INTRAVENOUS; SUBCUTANEOUS at 12:12

## 2021-02-25 RX ADMIN — METOPROLOL TARTRATE 25 MG: 25 TABLET, FILM COATED ORAL at 20:01

## 2021-02-25 RX ADMIN — PROPOFOL 50 MG: 10 INJECTION, EMULSION INTRAVENOUS at 08:53

## 2021-02-25 RX ADMIN — HYDROMORPHONE HYDROCHLORIDE 0.5 MG: 2 INJECTION INTRAMUSCULAR; INTRAVENOUS; SUBCUTANEOUS at 12:35

## 2021-02-25 RX ADMIN — PROPOFOL 30 MG: 10 INJECTION, EMULSION INTRAVENOUS at 07:46

## 2021-02-25 RX ADMIN — ROCURONIUM BROMIDE 10 MG: 10 INJECTION, SOLUTION INTRAVENOUS at 10:06

## 2021-02-25 RX ADMIN — OXYCODONE HYDROCHLORIDE 5 MG: 5 TABLET ORAL at 20:52

## 2021-02-25 ASSESSMENT — PAIN DESCRIPTION - FREQUENCY
FREQUENCY: CONTINUOUS
FREQUENCY: INTERMITTENT
FREQUENCY: CONTINUOUS

## 2021-02-25 ASSESSMENT — PULMONARY FUNCTION TESTS
PIF_VALUE: 29
PIF_VALUE: 33
PIF_VALUE: 29
PIF_VALUE: 31
PIF_VALUE: 28
PIF_VALUE: 29
PIF_VALUE: 29
PIF_VALUE: 28
PIF_VALUE: 7
PIF_VALUE: 29
PIF_VALUE: 38
PIF_VALUE: 10
PIF_VALUE: 24
PIF_VALUE: 30
PIF_VALUE: 25
PIF_VALUE: 29
PIF_VALUE: 41
PIF_VALUE: 35
PIF_VALUE: 31
PIF_VALUE: 34
PIF_VALUE: 31
PIF_VALUE: 28
PIF_VALUE: 22
PIF_VALUE: 42
PIF_VALUE: 30
PIF_VALUE: 28
PIF_VALUE: 1
PIF_VALUE: 28
PIF_VALUE: 25
PIF_VALUE: 36
PIF_VALUE: 29
PIF_VALUE: 26
PIF_VALUE: 28
PIF_VALUE: 27
PIF_VALUE: 29
PIF_VALUE: 37
PIF_VALUE: 34
PIF_VALUE: 8
PIF_VALUE: 30
PIF_VALUE: 43
PIF_VALUE: 30
PIF_VALUE: 36
PIF_VALUE: 1
PIF_VALUE: 28
PIF_VALUE: 11
PIF_VALUE: 27
PIF_VALUE: 30
PIF_VALUE: 30
PIF_VALUE: 24
PIF_VALUE: 43
PIF_VALUE: 31
PIF_VALUE: 36
PIF_VALUE: 31
PIF_VALUE: 30
PIF_VALUE: 29
PIF_VALUE: 25
PIF_VALUE: 29
PIF_VALUE: 36
PIF_VALUE: 38
PIF_VALUE: 43
PIF_VALUE: 26
PIF_VALUE: 35
PIF_VALUE: 27
PIF_VALUE: 29
PIF_VALUE: 30
PIF_VALUE: 31
PIF_VALUE: 1
PIF_VALUE: 29
PIF_VALUE: 36
PIF_VALUE: 39
PIF_VALUE: 27
PIF_VALUE: 29
PIF_VALUE: 35
PIF_VALUE: 19
PIF_VALUE: 28
PIF_VALUE: 22
PIF_VALUE: 0
PIF_VALUE: 35
PIF_VALUE: 28
PIF_VALUE: 33
PIF_VALUE: 28
PIF_VALUE: 29
PIF_VALUE: 27
PIF_VALUE: 25
PIF_VALUE: 1
PIF_VALUE: 31
PIF_VALUE: 0
PIF_VALUE: 28
PIF_VALUE: 1
PIF_VALUE: 28
PIF_VALUE: 1
PIF_VALUE: 34
PIF_VALUE: 46
PIF_VALUE: 32
PIF_VALUE: 9
PIF_VALUE: 22
PIF_VALUE: 30
PIF_VALUE: 1
PIF_VALUE: 29
PIF_VALUE: 34
PIF_VALUE: 30
PIF_VALUE: 37
PIF_VALUE: 29
PIF_VALUE: 38
PIF_VALUE: 2
PIF_VALUE: 40
PIF_VALUE: 42
PIF_VALUE: 28
PIF_VALUE: 35
PIF_VALUE: 30
PIF_VALUE: 26
PIF_VALUE: 0
PIF_VALUE: 30
PIF_VALUE: 27
PIF_VALUE: 27

## 2021-02-25 ASSESSMENT — PAIN DESCRIPTION - LOCATION
LOCATION: ABDOMEN
LOCATION: ABDOMEN;HEAD
LOCATION: ABDOMEN
LOCATION: ABDOMEN;HEAD
LOCATION: ABDOMEN

## 2021-02-25 ASSESSMENT — PAIN - FUNCTIONAL ASSESSMENT
PAIN_FUNCTIONAL_ASSESSMENT: 0-10
PAIN_FUNCTIONAL_ASSESSMENT: PREVENTS OR INTERFERES SOME ACTIVE ACTIVITIES AND ADLS

## 2021-02-25 ASSESSMENT — PAIN DESCRIPTION - DESCRIPTORS
DESCRIPTORS: ACHING;DISCOMFORT;TENDER
DESCRIPTORS: ACHING;DULL
DESCRIPTORS: ACHING;OTHER (COMMENT)
DESCRIPTORS: OTHER (COMMENT)
DESCRIPTORS: SHARP
DESCRIPTORS: SHARP
DESCRIPTORS: OTHER (COMMENT)
DESCRIPTORS: ACHING;DISCOMFORT
DESCRIPTORS: OTHER (COMMENT)

## 2021-02-25 ASSESSMENT — PAIN SCALES - GENERAL
PAINLEVEL_OUTOF10: 5
PAINLEVEL_OUTOF10: 7
PAINLEVEL_OUTOF10: 3
PAINLEVEL_OUTOF10: 7
PAINLEVEL_OUTOF10: 5
PAINLEVEL_OUTOF10: 7
PAINLEVEL_OUTOF10: 7
PAINLEVEL_OUTOF10: 3
PAINLEVEL_OUTOF10: 0
PAINLEVEL_OUTOF10: 2
PAINLEVEL_OUTOF10: 7
PAINLEVEL_OUTOF10: 6
PAINLEVEL_OUTOF10: 7
PAINLEVEL_OUTOF10: 2
PAINLEVEL_OUTOF10: 5
PAINLEVEL_OUTOF10: 0

## 2021-02-25 ASSESSMENT — PAIN DESCRIPTION - PAIN TYPE
TYPE: SURGICAL PAIN
TYPE: SURGICAL PAIN
TYPE: ACUTE PAIN;SURGICAL PAIN
TYPE: SURGICAL PAIN
TYPE: SURGICAL PAIN;ACUTE PAIN
TYPE: SURGICAL PAIN
TYPE: SURGICAL PAIN

## 2021-02-25 ASSESSMENT — PAIN DESCRIPTION - PROGRESSION
CLINICAL_PROGRESSION: GRADUALLY WORSENING
CLINICAL_PROGRESSION: GRADUALLY IMPROVING
CLINICAL_PROGRESSION: NOT CHANGED

## 2021-02-25 ASSESSMENT — PAIN DESCRIPTION - ORIENTATION
ORIENTATION: MID

## 2021-02-25 ASSESSMENT — PAIN DESCRIPTION - ONSET
ONSET: GRADUAL

## 2021-02-25 NOTE — ANESTHESIA POSTPROCEDURE EVALUATION
Department of Anesthesiology  Postprocedure Note    Patient: Robert Curiel  MRN: [id number]  YOB: 1945  Date of evaluation: 2/25/2021  Time:  12:04 PM     Procedure Summary     Date: 02/25/21 Room / Location: [facility] OR 03 / [address]    Anesthesia Start: 0730 Anesthesia Stop: 2193    Procedure: COLONOSCOPY WITH ABDOMINAL EXPLORATION   WITH BOWEL RESECTION LOW ANTERIOR   REPAIR  COLO VESSICLE FISTULA,   STOMA,  LYSIS OF ADHESIONS (N/A Abdomen) Diagnosis: (DX COLO VESSICLE FISTULA)    Surgeons: Navin Ledesma MD Responsible Provider: Manoj Lo MD    Anesthesia Type: general ASA Status: 3          Anesthesia Type: general    Yue Phase I: Yue Score: 5    Yue Phase II:      Last vitals: Reviewed and per EMR flowsheets.        Anesthesia Post Evaluation    Patient location during evaluation: PACU  Patient participation: complete - patient participated  Level of consciousness: awake and alert  Airway patency: patent  Nausea & Vomiting: no nausea and no vomiting  Complications: no  Cardiovascular status: hemodynamically stable  Respiratory status: face mask and spontaneous ventilation  Hydration status: euvolemic

## 2021-02-25 NOTE — ANESTHESIA POSTPROCEDURE EVALUATION
Department of Anesthesiology  Postprocedure Note    Patient: Chey Hernandez  MRN: [id number]  YOB: 1945  Date of evaluation: 2/25/2021  Time:  12:08 PM     Procedure Summary     Date: 02/25/21 Room / Location: [place] [address] / [facility] - INPATIENT    Anesthesia Start: 0730 Anesthesia Stop: 9065    Procedure: COLONOSCOPY WITH ABDOMINAL EXPLORATION   WITH BOWEL RESECTION LOW ANTERIOR   REPAIR  COLO VESSICLE FISTULA,   STOMA,  LYSIS OF ADHESIONS (N/A Abdomen) Diagnosis: (DX COLO VESSICLE FISTULA)    Surgeons: David Candelario MD Responsible Provider: Radha Saldivar MD    Anesthesia Type: general ASA Status: 3          Anesthesia Type: general    Yue Phase I: Yue Score: 5    Yue Phase II:      Last vitals: Reviewed and per EMR flowsheets.        Anesthesia Post Evaluation    Patient location during evaluation: PACU  Patient participation: complete - patient participated  Level of consciousness: awake and alert  Airway patency: patent  Nausea & Vomiting: no nausea and no vomiting  Complications: no  Cardiovascular status: hemodynamically stable  Respiratory status: face mask and spontaneous ventilation  Hydration status: euvolemic

## 2021-02-25 NOTE — H&P
History and Physical Service   [facility] [place] - INPATIENT    HISTORY AND PHYSICAL EXAMINATION            Date of Evaluation: 2/25/2021  Patient name: Stuart Camargo  MRN:   [id number]  YOB: 1945  PCP:    ALEX Alvarez Sa, CNP    History Obtained From:     Patient, medical records    History of Present Illness: This is Stuart Camargo a 67 y.o. female who presents today for a Colonoscopy with abdominal exploration, bowel resection low anterior repair colovesical fistula and possible stoma by Dr. Nolan Fernandez for colovesical fistula. H/o frequent recurrent UTI's for more than 6 months Referred to Dr Blaise Starr who performed a cystoscopy and discovered a colovesical fistula and was referred to Dr Flip Tinoco. Hx diverticulitis year ago. The patient denies bowel changes, rectal bleeding or unusual discharge. She was seen by Dr Flip Tinoco 1/13/21 who recommended surgical intervention and the patient arrived for her procedure. Patient followed the bowel prep as directed until clear  Denies fever, chills, dysuria, hematuria,or abdominal pain. Last Fish oil and ASA 325mg 2/18/21  Supplements 2/24/21    Hx Asthma/COPD controled with antiasthmatics Used Advair today  Pulmonary Clearance by Dr Zeeshan Springer 1/25/21 . Hx CHF, CAD HTN MVR 3 years ago   [de-identified] with Dr Ciara Calvert with cardiac clearance on 1/18/21 Denies dyspnea at rest, palpitations, lightheadedness, wheezing, cough, syncope or chest pain.   Medical Clearance by Keyona Hou CNP 2/1/21     Past Medical History:     Past Medical History:   Diagnosis Date    Acute respiratory failure following trauma and surgery ([facility] [address] [facility] 4/12/2017    Adiposity 7/21/2016    Arthralgia of multiple joints 7/21/2016    Arthritis     Asthma 6/15/2016    Asthma with acute exacerbation 7/21/2016    Atopic rhinitis 6/15/2016    CAD (coronary artery disease)     [facility]F [facility]congestive heart failure) ([facility]) 4/27/2018    Chronic bilateral low back pain without sciatica 6/15/2016    Closed fracture of gluconate (MAGONATE) 500 MG tablet Take 500 mg by mouth 2 times daily   Yes Historical Provider, MD   torsemide (DEMADEX) 20 MG tablet Take 20 mg by mouth daily   Yes Historical Provider, MD   acetaminophen (TYLENOL) 650 MG extended release tablet Take 650 mg by mouth   Yes Historical Provider, MD   Calcium Ascorbate 500 MG TABS Take 600 mg by mouth    Yes Historical Provider, MD   atorvastatin (LIPITOR) 20 MG tablet Take 20 mg by mouth 5/30/18  Yes Historical Provider, MD   buPROPion (WELLBUTRIN SR) 150 MG extended release tablet TAKE 1 TABLET BY MOUTH TWO  TIMES DAILY 7/30/18  Yes Historical Provider, MD   Calcium Carbonate-Vitamin D 600-125 MG-UNIT TABS Take 600 mg by mouth   Yes Historical Provider, MD   cetirizine (ZYRTEC) 10 MG tablet Take 10 mg by mouth 11/17/17  Yes Historical Provider, MD   Cholecalciferol 2000 units CAPS Take 2,000 Units by mouth   Yes Historical Provider, MD   citalopram (CELEXA) 20 MG tablet 10 mg  7/16/18  Yes Historical Provider, MD   fluticasone (FLONASE) 50 MCG/ACT nasal spray 1 spray by Nasal route 7/12/17  Yes Historical Provider, MD   fluticasone-salmeterol (ADVAIR) 500-50 MCG/DOSE diskus inhaler Inhale 1 puff into the lungs   Yes Historical Provider, MD   LUTEIN PO Take 20 mg by mouth   Yes Historical Provider, MD   Melatonin 2.5 MG CHEW Take 3 mg by mouth   Yes Historical Provider, MD   metoprolol tartrate (LOPRESSOR) 25 MG tablet Take 25 mg by mouth   Yes Historical Provider, MD   Multiple Vitamins-Minerals (MULTIVITAMIN ADULT PO) Take 1 tablet by mouth   Yes Historical Provider, MD   pantoprazole (PROTONIX) 40 MG tablet TAKE 1 TABLET BY MOUTH  DAILY 10/5/18  Yes Historical Provider, MD   polyethylene glycol (MIRALAX) powder Take 17 g by mouth   Yes Historical Provider, MD   SENNOSIDES-DOCUSATE SODIUM PO Take 2 tablets by mouth   Yes Historical Provider, MD   spironolactone (ALDACTONE) 25 MG tablet Take 25 mg by mouth 7/3/18  Yes Historical Provider, MD   albuterol sulfate HFA 108 (90 Base) MCG/ACT inhaler Inhale 2 puffs into the lungs    Historical Provider, MD   aspirin 325 MG tablet Take 325 mg by mouth    Historical Provider, MD   Azelastine-Fluticasone 137-50 MCG/ACT SUSP 1 spray by Nasal route 4/16/18   Historical Provider, MD   ipratropium-albuterol (DUONEB) 0.5-2.5 (3) MG/3ML SOLN nebulizer solution Inhale 3 mLs into the lungs    Historical Provider, MD   nystatin ([address]) 323622 UNIT/GM powder APPLY TO AFFECTED AREA(S) THREE TIMES A DAY AS NEEDED UNDER BREASTS 5/21/18   Historical Provider, MD   Omega-3 Fatty Acids (FISH OIL PO) Take 2 g by mouth    Historical Provider, MD   solifenacin (VESICARE) 5 MG tablet TAKE 1 TABLET BY MOUTH ONCE DAILY 8/24/18   Historical Provider, MD        Allergies:     Penicillins and Sulfa antibiotics    Social History:     Tobacco:    reports that she is a non-smoker but has been exposed to tobacco smoke. She has never used smokeless tobacco.  Alcohol:      reports current alcohol use. Drug Use:  reports previous drug use. Family History:     History reviewed. No pertinent family history. Review of Systems:     Positive and Negative as described in HPI. CONSTITUTIONAL:  negative for fevers, chills, sweats, fatigue, weight loss  HEENT:glasses  negative for vision, hearing changes, runny nose, throat pain  RESPIRATORY:  +Asthma/COPD began using home O2 2L/nc negative for shortness of breath, cough, congestion, wheezing. CARDIOVASCULAR:  negative for chest pain, palpitations.   GASTROINTESTINAL:  See HPI negative for nausea, vomiting, diarrhea, constipation, change in bowel habits, abdominal pain   GENITOURINARY: See HPI    INTEGUMENT:  negative for rash, skin lesions, easy bruising   HEMATOLOGIC/LYMPHATIC:  +Lower leg swelling/edema   ALLERGIC/IMMUNOLOGIC:  negative for urticaria , itching  ENDOCRINE:  negative increase in drinking, increase in urination, hot or cold intolerance  MUSCULOSKELETAL: + back athralgia  negative joint pains, muscle aches, swelling of joints  NEUROLOGICAL:  negative for headaches, dizziness, lightheadedness, numbness, pain, tingling extremities  BEHAVIOR/PSYCH:  negative for depression, anxiety    Physical Exam:   BP (!) 146/80   Pulse 65   Temp 96.9 °F (36.1 °C) (Temporal)   Resp 20   Ht 5' 2\" (1.575 m)   Wt 299 lb (135.6 kg)   SpO2 96%   BMI 54.69 kg/m²   No LMP recorded. Patient has had a hysterectomy. No obstetric history on file. No results for input(s): POCGLU in the last 72 hours. General Appearance: Morbidly obese alert, well appearing, and in no acute distress  Mental status: oriented to person, place, and time with normal affect  Head:  normocephalic, atraumatic. Eye: glasses no icterus, redness, pupils equal and reactive, extraocular eye movements intact, conjunctiva clear  Ear: normal external ear, no discharge, hearing intact  Nose:  no drainage noted  Mouth: mucous membranes moist  Neck: thick with decreased visible airway supple, no carotid bruits, thyroid not palpable  Lungs: Bilateral equal air entry, diminished breath sounds unlabored at rest w/o use of accessory muscles,, no wheezing, rales or rhonchi, normal effort  Cardiovascular: HR 65  normal rate, regular rhythm, soft systolic  murmur, gallop, rub. Abdomen: Obese w/pannus soft, nontender, nondistended, normal bowel sounds  Neurologic: There are no new focal motor or sensory deficits, normal muscle tone and bulk, no abnormal sensation, normal speech, cranial nerves II through XII grossly intact  Skin: No gross lesions, rashes, bruising or bleeding on exposed skin area  Extremities: bilateral lower leg edema with mild skin redness  peripheral pulses palpable, no  calf pain with palpation  Psych: normal affect     Investigations:      Laboratory Testing:  No results found for this or any previous visit (from the past 24 hour(s)).     No results for input(s): HGB, HCT, WBC, MCV, PLATELET, NA, K, CL, CO2, BUN, CREATININE, GLUCOSE, INR, PROTIME, APTT, AST, ALT, LABALBU, HCG in the last 720 hours. Recent Labs     02/21/21  1000   COVID19       Not Detected     Imaging/Diagnostics:    No results found. Diagnosis:      1. Colovesical fistula    Plans:     1.  Colonoscopy with abdominal exploration, bowel resection low anterior repair colovesical fistula and possible stoma      ALEX Hood CNP  2/25/2021  7:04 AM

## 2021-02-25 NOTE — PROGRESS NOTES
[facility]        NAME:  [facility] RECORD NUMBER:  [id number]  AGE: 67 y.o. GENDER: female  : 1945  TODAY'S DATE:  2021      [facility] nursing consult noted for ostomy care and education. Met with the patient briefly for introduction. She states feeling \"hazy,\" per RN she just received a dose of Morphine. Will plan to visit tomorrow for ostomy education and will follow case through discharge for ostomy needs.     Joel SantiagoN, RN, [facility] [address]  Wound, Ostomy, and Continence Nursing  [phone]

## 2021-02-25 NOTE — OP NOTE
position. General anesthesia was induced. An ET tube, OG tube, Ornelas catheter were placed. The area was prepped and draped in usual sterile fashion. A lower midline laparotomy incision was made using a 10 blade scalpel. Dissection was then carried down to the level of the fascia using Bovie electrocautery. Once the fascia was found, it was incised along the length of the incision using cautery. The abdomen was entered through the peritoneum using the cautery ensuring to protect the bowel underneath. If you adhesions were noted upon entry to the abdomen. These were taken down using Bovie electrocautery and blunt dissection. Once adhesions were taken down, a Bookwalter was placed for retraction. The small bowel was packed to the sides. The colon was found. The colon was freed from its surrounding adhesions using Bovie electrocautery and blunt dissection. The white line of Toldt was taken down using Bovie electrocautery to mobilize the sigmoid and descending colon. There was noted to be a large amount of adhesions and chronic inflammatory tissue near the sigmoid and bladder. The sigmoid colon was carefully resected from the wall of the bladder using Bovie electrocautery and blunt dissection. The ureter was found on the left side to ensure that it was protected and intact. Once the sigmoid was free, a area of the descending colon/proximal sigmoid was found where the tissue appeared to be healthy and without any chronic inflammation. A small window was made in the mesentery using Bovie electrocautery. A contour stapler was then placed through this window around the colon. The colon was dissected. Next, using the LigaSure, the mesentery was taken down from the area of dissected colon distally to the superior rectum. An area of the colon that appeared to be healthy tissue was found and another contour stapler was placed around the superior rectum and it was dissected.   The specimen was then passed off and sent to pathology. Next, the descending colon was mobilized along its lateral attachments, up to the splenic flexure. The splenic flexure was mobilized using Bovie electrocautery and LigaSure. After mobilization of the splenic flexure, it appeared that the descending colon was unable to reach to the rectal stump in a manner that would allow for tension-free anastomoses. At this time the decision was made to create an end colostomy rather than perform an anastomosis. Attention was then turned to creating the colostomy site. An area on the left mid abdomen was chosen, this area was previously marked prior to surgery for the possibility of needing a colostomy. A circumferential incision was made through the skin and the skin was removed. Dissection was then carried down through the subcutaneous tissue to the level of the fascia using Bovie electrocautery. Once the fascia was found, a cruciate incision was created through the fascia. Dissection was then carried through the muscle and peritoneum until entrance into the abdomen. 3 finger widths were able to be passed through the colostomy site. The bowel was then grasped with a Walling. The bowel was made sure to not be twisted when it was then pulled through the fascia and up towards the level of the skin. The site was then covered with a towel. Attention was then turned back to the laparotomy site. The abdomen was thoroughly irrigated with 2 L of sterile saline and irrisept. Hemostasis was obtained. The omentum was then draped over the bowel. The fascia was then closed in a running fashion using two 0 looped PDS sutures. The first was started proximally and the second distally and they were tied in the center. The subcutaneous tissue was then irrigated with irrisept. The skin was then approximated using skin staples. A towel was then placed over the midline incision that had been closed. The end colostomy was then created.   The staple line was cut off using Bovie electrocautery. The bowel was then sutured in a Julia fashion in [facility] on the colostomy site. This was done with 4-0 Vicryl sutures. Further sutures were then placed between these directional sutures in a Julia fashion. The colostomy was patent. The area was cleaned and dried and an ostomy device was placed. A dressing was placed over the midline incision. The patient was then placed back in supine position. She was awoken in the operating room and taken to PACU in stable condition. A Ornelas remained in place. Dr. Troy Dominguez was present for the entire case. Electronically signed by Susan Hackett DO on 2/25/2021 at 11:14 AM       I Dr. Troy Dominguez was present throughout and performed the entire procedure. Nehal Ricks  Colorectal Surgery

## 2021-02-25 NOTE — ANESTHESIA PRE PROCEDURE
Historical Provider, MD   LUTEIN PO Take 20 mg by mouth   Yes Historical Provider, MD   Melatonin 2.5 MG CHEW Take 3 mg by mouth   Yes Historical Provider, MD   metoprolol tartrate (LOPRESSOR) 25 MG tablet Take 25 mg by mouth   Yes Historical Provider, MD   Multiple Vitamins-Minerals (MULTIVITAMIN ADULT PO) Take 1 tablet by mouth   Yes Historical Provider, MD   pantoprazole (PROTONIX) 40 MG tablet TAKE 1 TABLET BY MOUTH  DAILY 10/5/18  Yes Historical Provider, MD   polyethylene glycol (MIRALAX) powder Take 17 g by mouth   Yes Historical Provider, MD   SENNOSIDES-DOCUSATE SODIUM PO Take 2 tablets by mouth   Yes Historical Provider, MD   spironolactone (ALDACTONE) 25 MG tablet Take 25 mg by mouth 7/3/18  Yes Historical Provider, MD   albuterol sulfate  (90 Base) MCG/ACT inhaler Inhale 2 puffs into the lungs    Historical Provider, MD   aspirin 325 MG tablet Take 325 mg by mouth    Historical Provider, MD   Azelastine-Fluticasone 137-50 MCG/ACT SUSP 1 spray by Nasal route 4/16/18   Historical Provider, MD   ipratropium-albuterol (DUONEB) 0.5-2.5 (3) MG/3ML SOLN nebulizer solution Inhale 3 mLs into the lungs    Historical Provider, MD   nystatin ([address]) 474650 UNIT/GM powder APPLY TO AFFECTED AREA(S) THREE TIMES A DAY AS NEEDED UNDER BREASTS 5/21/18   Historical Provider, MD   Omega-3 Fatty Acids (FISH OIL PO) Take 2 g by mouth    Historical Provider, MD   solifenacin (VESICARE) 5 MG tablet TAKE 1 TABLET BY MOUTH ONCE DAILY 8/24/18   Historical Provider, MD       Current medications:    Current Facility-Administered Medications   Medication Dose Route Frequency Provider Last Rate Last Admin    [START ON 2/26/2021] lactated ringers infusion   Intravenous Continuous Deepali Dad,  mL/hr at 02/25/21 0704 New Bag at 02/25/21 0704    sodium chloride flush 0.9 % injection 10 mL  10 mL Intravenous 2 times per day Carroll Brooks,         sodium chloride flush 0.9 % injection 10 mL  10 mL Intravenous PRN Duane Mylar, DO        [START ON 2/26/2021] lidocaine PF 1 % injection 1 mL  1 mL Intradermal Once PRN Duane Mylar, DO           Allergies: Allergies   Allergen Reactions    Penicillins      Pt does not know reaction, too long ago. Tolerates cephalosporins    Sulfa Antibiotics      Pt does not know reaction, too long ago. Problem List:    Patient Active Problem List   Diagnosis Code    Acute respiratory failure following trauma and surgery ([facility] J95.821    Adiposity E66.9    Arthralgia of multiple joints M25.50    Asthma J45.909    Asthma with acute exacerbation J45. 0    Atopic rhinitis J30.9    CHF (congestive heart failure) ([facility]) I50.9    Chronic bilateral low back pain without sciatica M54.5, G89.29    Closed fracture of proximal end of humerus S42.209A    Depression F32.9    Diverticulitis K57.92    Diverticulosis of large intestine K57.30    Diverticulosis of large intestine without hemorrhage K57.30    Essential hypertension I10    H/O hysterectomy for benign disease Z90.710    Heart murmur R01.1    Hyperlipidemia E78.5    Hypothermia due to anesthesia T88.51XA    Laceration of chest wall S21.119A    Mitral incompetence I34.0    Morbid obesity ([facility]) E66.01    Non-rheumatic mitral regurgitation I34.0    Polyp of sigmoid colon K63.5    Postmenopausal status Z78.0    Urinary incontinence R32       Past Medical History:        Diagnosis Date    Acute respiratory failure following trauma and surgery ([facility] 4/12/2017    Adiposity 7/21/2016    Arthralgia of multiple joints 7/21/2016    Arthritis     Asthma 6/15/2016    Asthma with acute exacerbation 7/21/2016    Atopic rhinitis 6/15/2016    CAD (coronary artery disease)     CHF (congestive heart failure) ([facility]) 4/27/2018    Chronic bilateral low back pain without sciatica 6/15/2016    Closed fracture of proximal end of humerus 10/16/2018    COPD (chronic obstructive pulmonary disease) ([facility]     Depression 6/15/2016    Diverticulitis 10/1/2018    Diverticulosis of large intestine 6/15/2016    Diverticulosis of large intestine without hemorrhage 1/16/2017    Encounter for mammogram to establish baseline mammogram 7/21/2016    Essential hypertension 6/15/2016    H/O hysterectomy for benign disease 3/3/2013    Heart murmur 7/21/2016    History of blood transfusion     Hyperlipidemia 6/15/2016    Hypothermia due to anesthesia 4/12/2017    Laceration of chest wall 8/22/2017    Mitral incompetence 4/12/2017    Morbid obesity ([address] 6/15/2016    Non-rheumatic mitral regurgitation 3/7/2017    Polyp of sigmoid colon 6/15/2016    Postmenopausal status 6/15/2016    Routine general medical examination at a health care facility 7/21/2016    Special screening for malignant neoplasms, colon 7/21/2016    Special screening for malignant neoplasms, vagina 7/21/2016    Urinary incontinence 6/15/2016       Past Surgical History:        Procedure Laterality Date    APPENDECTOMY      BRONCHOSCOPY      CARDIAC CATHETERIZATION  03/22/2017    CARDIAC CATHETERIZATION  11/18*/2016    COLONOSCOPY      ENDOSCOPY, COLON, DIAGNOSTIC      EYE SURGERY Bilateral     cataract    FRACTURE SURGERY      clavicle/shoulder    HYSTERECTOMY      MITRAL VALVE REPAIR      OTHER SURGICAL HISTORY      Minimally invasive MVR 32MMCG future ring/kyle/femoral cannulation    OVARY REMOVAL      WOUND EXPLORATION      arterial bleed       Social History:    Social History     Tobacco Use    Smoking status: Passive Smoke Exposure - Never Smoker    Smokeless tobacco: Never Used   Substance Use Topics    Alcohol use: Yes     Comment: rarely                                Counseling given: Not Answered      Vital Signs (Current):   Vitals:    02/25/21 0604 02/25/21 0612   BP:  (!) 146/80   Pulse:  65   Resp:  20   Temp:  96.9 °F (36.1 °C)   TempSrc:  Temporal   SpO2:  96%   Weight: 299 lb (135.6 kg)    Height: 5' 2\" (1.575 m) controlled, morbid obesity          Endo/Other:    (+) : arthritis: OA., .                  ROS comment: -NPO AFTER MIDNIGHT  -ALLERGIES - PCN, SULFA Abdominal:           Vascular:                                        Anesthesia Plan      general     ASA 3     (GETA)  Induction: intravenous. MIPS: Postoperative opioids intended and Prophylactic antiemetics administered. Anesthetic plan and risks discussed with patient. Plan discussed with CRNA.     Attending anesthesiologist reviewed and agrees with Pre Eval content              Hang Gonsales MD   2/25/2021

## 2021-02-25 NOTE — PROGRESS NOTES
Patient weight is between 101-149kg. For prophylaxis with Enoxaparin, Pharmacy adjusted the dose to account for the patient's increased body weight in accordance with hospital approved protocol. The dose has been changed to 30mg BID. Please contact pharmacy with any concerns @ [phone]. Thank you.    Dena Hector [facility]  2/25/2021 2:06 PM

## 2021-02-25 NOTE — ANESTHESIA POSTPROCEDURE EVALUATION
Department of Anesthesiology  Postprocedure Note    Patient: Sandrita Sosa  MRN: [id number]  YOB: 1945  Date of evaluation: 2/25/2021  Time:  2:55 PM     Procedure Summary     Date: 02/25/21 Room / Location: [facility] OR 03 / [facility] - INPATIENT    Anesthesia Start: 0730 Anesthesia Stop: 3734    Procedure: COLONOSCOPY WITH ABDOMINAL EXPLORATION   WITH BOWEL RESECTION LOW ANTERIOR   REPAIR  COLO VESSICLE FISTULA,   STOMA,  LYSIS OF ADHESIONS (N/A Abdomen) Diagnosis: (DX COLO VESSICLE FISTULA)    Surgeons: Tanya Freeman MD Responsible Provider: Karlene Kc MD    Anesthesia Type: general ASA Status: 3          Anesthesia Type: general    Yue Phase I: Yue Score: 9    Yue Phase II:      Last vitals: Reviewed and per EMR flowsheets.        Anesthesia Post Evaluation    Comments: [facility]

## 2021-02-25 NOTE — PROGRESS NOTES
Historical Provider, MD   Multiple Vitamins-Minerals (MULTIVITAMIN ADULT PO) Take 1 tablet by mouth   Yes Historical Provider, MD   pantoprazole (PROTONIX) 40 MG tablet TAKE 1 TABLET BY MOUTH  DAILY 10/5/18  Yes Historical Provider, MD   polyethylene glycol (MIRALAX) powder Take 17 g by mouth   Yes Historical Provider, MD   SENNOSIDES-DOCUSATE SODIUM PO Take 2 tablets by mouth   Yes Historical Provider, MD   spironolactone (ALDACTONE) 25 MG tablet Take 25 mg by mouth 7/3/18  Yes Historical Provider, MD   albuterol sulfate  (90 Base) MCG/ACT inhaler Inhale 2 puffs into the lungs    Historical Provider, MD   aspirin 325 MG tablet Take 325 mg by mouth    Historical Provider, MD   Azelastine-Fluticasone 137-50 MCG/ACT SUSP 1 spray by Nasal route 4/16/18   Historical Provider, MD   ipratropium-albuterol (DUONEB) 0.5-2.5 (3) MG/3ML SOLN nebulizer solution Inhale 3 mLs into the lungs    Historical Provider, MD   nystatin ([address]) 830427 UNIT/GM powder APPLY TO AFFECTED AREA(S) THREE TIMES A DAY AS NEEDED UNDER BREASTS 5/21/18   Historical Provider, MD   Omega-3 Fatty Acids (FISH OIL PO) Take 2 g by mouth    Historical Provider, MD   solifenacin (VESICARE) 5 MG tablet TAKE 1 TABLET BY MOUTH ONCE DAILY 8/24/18   Historical Provider, MD   .  Recent Surgical History: Lower Abdominal = 2     Assessment     Peak Flow (asthma only)    Predicted: N/A  Personal Best: N/A  PEF N/A  % Predicted n/A  Peak Flow : not applicable = 0    FVT3/VELAZQUEZ    FEV1 Predicted N/A      FEV1 N/A    FEV1 % Predicted N/A  FVC N/A  IS volume 250  IBW N/A  FIO2% 32  SPO2 95  RR 20  Breath Sounds: clear and diminished lower lobes      · Bronchodilator assessment at level 2  · Hyperinflation assessment at level   · Secretion Management assessment at level    ·   · [x]    Bronchodilator Assessment  BRONCHODILATOR ASSESSMENT SCORE  Score 0 1 2 3 4 5   Breath Sounds   []  Patient Baseline [x]  No Wheeze good aeration []  Faint, scattered wheezing, good aeration []  Expiratory Wheezing and or moderately diminished []  Insp/Exp wheeze and/or very diminished []  Insp/Exp and/ or marked distress   Respiratory Rate   []  Patient Baseline []  Less than 20 [x]  Less than 20 []  20-25 []  Greater than 25 []  Greater than 25   Peak flow % of Pred or PB [x]  NA   []  Greater than 90%  []  81-90% []  71-80% []  Less than or equal to 70%  or unable to perform []  Unable due to Respiratory Distress   Dyspnea re []  Patient Baseline []  No SOB [x]  No SOB []  SOB on exertion []  SOB min activity []  At rest/acute   e FEV% Predicted       [x]  NA []  Above 69%  []  Unable []  Above 60-69%  []  Unable []  Above 50-59%  []  Unable []  Above 35-49%  []  Unable []  Less than 35%  []  Unable                 []  Hyperinflation Assessment  Score 1 2 3   CXR and Breath Sounds   []  Clear []  No atelectasis  Basilar aeration []  Atelectasis or absent basilar breath sounds   Incentive Spirometry Volume  (Per IBW)   []  Greater than or equal to 15ml/Kg []  less than 15ml/Kg []  less than 15ml/Kg   Surgery within last 2 weeks []  None or general   []  Abdominal or thoracic surgery  []  Abdominal or thoracic   Chronic Pulmonary Historyre []  No []  Yes []  Yes     []  Secretion Management Assessment  Score 1 2 3   Bilateral Breath Sounds   []  Occasional Rhonchi []  Scattered Rhonchi []  Course Rhonchi and/or poor aeration   Sputum    []  Small amount of thin secretions []  Moderate amount of viscous secretions []  Copius, Viscious Yellow/ Secretions   CXR as reported by physician []  clear  []  Unavailable []  Infiltrates and/or consolidation  []  Unavailable []  Mucus Plugging and or lobar consolidation  []  Unavailable   Cough []  Strong, productive cough []  Weak productive cough []  No cough or weak non-productive cough

## 2021-02-26 ENCOUNTER — APPOINTMENT (OUTPATIENT)
Dept: GENERAL RADIOLOGY | Age: 76
DRG: 981 | End: 2021-02-26
Attending: COLON & RECTAL SURGERY
Payer: MEDICARE

## 2021-02-26 LAB
ABSOLUTE EOS #: <0.03 K/UL (ref 0–0.44)
ABSOLUTE IMMATURE GRANULOCYTE: 0.05 K/UL (ref 0–0.3)
ABSOLUTE LYMPH #: 0.72 K/UL (ref 1.1–3.7)
ABSOLUTE MONO #: 0.91 K/UL (ref 0.1–1.2)
ANION GAP SERPL CALCULATED.3IONS-SCNC: 10 MMOL/L (ref 9–17)
BASOPHILS # BLD: 0 % (ref 0–2)
BASOPHILS ABSOLUTE: <0.03 K/UL (ref 0–0.2)
BUN BLDV-MCNC: 21 MG/DL (ref 8–23)
BUN/CREAT BLD: 18 (ref 9–20)
CALCIUM SERPL-MCNC: 8.6 MG/DL (ref 8.6–10.4)
CHLORIDE BLD-SCNC: 102 MMOL/L (ref 98–107)
CO2: 25 MMOL/L (ref 20–31)
CREAT SERPL-MCNC: 1.18 MG/DL (ref 0.5–0.9)
DIFFERENTIAL TYPE: ABNORMAL
EOSINOPHILS RELATIVE PERCENT: 0 % (ref 1–4)
GFR AFRICAN AMERICAN: 54 ML/MIN
GFR NON-AFRICAN AMERICAN: 45 ML/MIN
GFR SERPL CREATININE-BSD FRML MDRD: ABNORMAL ML/MIN/{1.73_M2}
GFR SERPL CREATININE-BSD FRML MDRD: ABNORMAL ML/MIN/{1.73_M2}
GLUCOSE BLD-MCNC: 138 MG/DL (ref 70–99)
HCT VFR BLD CALC: 33.4 % (ref 36.3–47.1)
HEMOGLOBIN: 10 G/DL (ref 11.9–15.1)
IMMATURE GRANULOCYTES: 1 %
LYMPHOCYTES # BLD: 7 % (ref 24–43)
MCH RBC QN AUTO: 29.4 PG (ref 25.2–33.5)
MCHC RBC AUTO-ENTMCNC: 29.9 G/DL (ref 28.4–34.8)
MCV RBC AUTO: 98.2 FL (ref 82.6–102.9)
MONOCYTES # BLD: 9 % (ref 3–12)
NRBC AUTOMATED: 0 PER 100 WBC
PDW BLD-RTO: 13.8 % (ref 11.8–14.4)
PLATELET # BLD: 191 K/UL (ref 138–453)
PLATELET ESTIMATE: ABNORMAL
PMV BLD AUTO: 9.8 FL (ref 8.1–13.5)
POTASSIUM SERPL-SCNC: 4.4 MMOL/L (ref 3.7–5.3)
RBC # BLD: 3.4 M/UL (ref 3.95–5.11)
RBC # BLD: ABNORMAL 10*6/UL
SEG NEUTROPHILS: 83 % (ref 36–65)
SEGMENTED NEUTROPHILS ABSOLUTE COUNT: 9.01 K/UL (ref 1.5–8.1)
SODIUM BLD-SCNC: 137 MMOL/L (ref 135–144)
SURGICAL PATHOLOGY REPORT: NORMAL
WBC # BLD: 10.7 K/UL (ref 3.5–11.3)
WBC # BLD: ABNORMAL 10*3/UL

## 2021-02-26 PROCEDURE — 2500000003 HC RX 250 WO HCPCS: Performed by: STUDENT IN AN ORGANIZED HEALTH CARE EDUCATION/TRAINING PROGRAM

## 2021-02-26 PROCEDURE — 6370000000 HC RX 637 (ALT 250 FOR IP): Performed by: STUDENT IN AN ORGANIZED HEALTH CARE EDUCATION/TRAINING PROGRAM

## 2021-02-26 PROCEDURE — 2060000000 HC ICU INTERMEDIATE R&B

## 2021-02-26 PROCEDURE — 6360000002 HC RX W HCPCS: Performed by: NURSE PRACTITIONER

## 2021-02-26 PROCEDURE — 94761 N-INVAS EAR/PLS OXIMETRY MLT: CPT

## 2021-02-26 PROCEDURE — 80048 BASIC METABOLIC PNL TOTAL CA: CPT

## 2021-02-26 PROCEDURE — 71045 X-RAY EXAM CHEST 1 VIEW: CPT

## 2021-02-26 PROCEDURE — 6360000002 HC RX W HCPCS: Performed by: STUDENT IN AN ORGANIZED HEALTH CARE EDUCATION/TRAINING PROGRAM

## 2021-02-26 PROCEDURE — 94640 AIRWAY INHALATION TREATMENT: CPT

## 2021-02-26 PROCEDURE — 36415 COLL VENOUS BLD VENIPUNCTURE: CPT

## 2021-02-26 PROCEDURE — 85025 COMPLETE CBC W/AUTO DIFF WBC: CPT

## 2021-02-26 PROCEDURE — 2700000000 HC OXYGEN THERAPY PER DAY

## 2021-02-26 PROCEDURE — 6370000000 HC RX 637 (ALT 250 FOR IP): Performed by: INTERNAL MEDICINE

## 2021-02-26 PROCEDURE — 2500000003 HC RX 250 WO HCPCS: Performed by: NURSE PRACTITIONER

## 2021-02-26 PROCEDURE — 2580000003 HC RX 258: Performed by: STUDENT IN AN ORGANIZED HEALTH CARE EDUCATION/TRAINING PROGRAM

## 2021-02-26 RX ORDER — BUDESONIDE AND FORMOTEROL FUMARATE DIHYDRATE 160; 4.5 UG/1; UG/1
2 AEROSOL RESPIRATORY (INHALATION) 2 TIMES DAILY
Status: DISCONTINUED | OUTPATIENT
Start: 2021-02-26 | End: 2021-03-09

## 2021-02-26 RX ORDER — BUDESONIDE AND FORMOTEROL FUMARATE DIHYDRATE 160; 4.5 UG/1; UG/1
2 AEROSOL RESPIRATORY (INHALATION) 2 TIMES DAILY
Status: DISCONTINUED | OUTPATIENT
Start: 2021-02-26 | End: 2021-02-26

## 2021-02-26 RX ORDER — FUROSEMIDE 10 MG/ML
20 INJECTION INTRAMUSCULAR; INTRAVENOUS ONCE
Status: COMPLETED | OUTPATIENT
Start: 2021-02-26 | End: 2021-02-26

## 2021-02-26 RX ORDER — DEXTROSE, SODIUM CHLORIDE, AND POTASSIUM CHLORIDE 5; .45; .15 G/100ML; G/100ML; G/100ML
INJECTION INTRAVENOUS CONTINUOUS
Status: DISCONTINUED | OUTPATIENT
Start: 2021-02-26 | End: 2021-03-12 | Stop reason: HOSPADM

## 2021-02-26 RX ORDER — AMIODARONE HYDROCHLORIDE 200 MG/1
100 TABLET ORAL
Status: DISCONTINUED | OUTPATIENT
Start: 2021-02-26 | End: 2021-03-08

## 2021-02-26 RX ORDER — FLUTICASONE PROPIONATE 50 MCG
1 SPRAY, SUSPENSION (ML) NASAL DAILY
Status: DISCONTINUED | OUTPATIENT
Start: 2021-02-26 | End: 2021-03-12 | Stop reason: HOSPADM

## 2021-02-26 RX ADMIN — IBUPROFEN 400 MG: 200 TABLET, FILM COATED ORAL at 23:06

## 2021-02-26 RX ADMIN — OXYCODONE HYDROCHLORIDE 5 MG: 5 TABLET ORAL at 04:55

## 2021-02-26 RX ADMIN — IPRATROPIUM BROMIDE AND ALBUTEROL SULFATE 1 AMPULE: .5; 3 SOLUTION RESPIRATORY (INHALATION) at 14:14

## 2021-02-26 RX ADMIN — AMIODARONE HYDROCHLORIDE 100 MG: 200 TABLET ORAL at 20:38

## 2021-02-26 RX ADMIN — IPRATROPIUM BROMIDE AND ALBUTEROL SULFATE 1 AMPULE: .5; 3 SOLUTION RESPIRATORY (INHALATION) at 19:36

## 2021-02-26 RX ADMIN — IBUPROFEN 400 MG: 200 TABLET, FILM COATED ORAL at 05:18

## 2021-02-26 RX ADMIN — CITALOPRAM HYDROBROMIDE 10 MG: 10 TABLET ORAL at 09:01

## 2021-02-26 RX ADMIN — ATORVASTATIN CALCIUM 20 MG: 20 TABLET, FILM COATED ORAL at 09:01

## 2021-02-26 RX ADMIN — TORSEMIDE 20 MG: 20 TABLET ORAL at 09:01

## 2021-02-26 RX ADMIN — OXYCODONE HYDROCHLORIDE 5 MG: 5 TABLET ORAL at 09:01

## 2021-02-26 RX ADMIN — BUDESONIDE AND FORMOTEROL FUMARATE DIHYDRATE 2 PUFF: 160; 4.5 AEROSOL RESPIRATORY (INHALATION) at 19:37

## 2021-02-26 RX ADMIN — OXYCODONE HYDROCHLORIDE 5 MG: 5 TABLET ORAL at 17:18

## 2021-02-26 RX ADMIN — IBUPROFEN 400 MG: 200 TABLET, FILM COATED ORAL at 11:53

## 2021-02-26 RX ADMIN — BUPROPION HYDROCHLORIDE 150 MG: 150 TABLET, EXTENDED RELEASE ORAL at 09:01

## 2021-02-26 RX ADMIN — SPIRONOLACTONE 25 MG: 25 TABLET ORAL at 09:01

## 2021-02-26 RX ADMIN — ENOXAPARIN SODIUM 30 MG: 30 INJECTION SUBCUTANEOUS at 09:03

## 2021-02-26 RX ADMIN — SODIUM CHLORIDE, POTASSIUM CHLORIDE, SODIUM LACTATE AND CALCIUM CHLORIDE: 600; 310; 30; 20 INJECTION, SOLUTION INTRAVENOUS at 00:17

## 2021-02-26 RX ADMIN — DEXTROSE MONOHYDRATE, SODIUM CHLORIDE, AND POTASSIUM CHLORIDE: 50; 4.5; 1.49 INJECTION, SOLUTION INTRAVENOUS at 20:45

## 2021-02-26 RX ADMIN — DEXTROSE MONOHYDRATE, SODIUM CHLORIDE, AND POTASSIUM CHLORIDE: 50; 4.5; 1.49 INJECTION, SOLUTION INTRAVENOUS at 06:36

## 2021-02-26 RX ADMIN — FUROSEMIDE 20 MG: 10 INJECTION, SOLUTION INTRAMUSCULAR; INTRAVENOUS at 11:53

## 2021-02-26 RX ADMIN — METOPROLOL TARTRATE 25 MG: 25 TABLET, FILM COATED ORAL at 09:01

## 2021-02-26 RX ADMIN — ACETAMINOPHEN 1000 MG: 500 TABLET, COATED ORAL at 16:15

## 2021-02-26 RX ADMIN — ENOXAPARIN SODIUM 30 MG: 30 INJECTION SUBCUTANEOUS at 20:37

## 2021-02-26 RX ADMIN — OXYCODONE HYDROCHLORIDE 5 MG: 5 TABLET ORAL at 21:50

## 2021-02-26 RX ADMIN — ACETAMINOPHEN 1000 MG: 500 TABLET, COATED ORAL at 06:38

## 2021-02-26 RX ADMIN — PANTOPRAZOLE SODIUM 40 MG: 40 TABLET, DELAYED RELEASE ORAL at 06:38

## 2021-02-26 RX ADMIN — ACETAMINOPHEN 1000 MG: 500 TABLET, COATED ORAL at 23:06

## 2021-02-26 RX ADMIN — OXYCODONE HYDROCHLORIDE 5 MG: 5 TABLET ORAL at 00:41

## 2021-02-26 RX ADMIN — IBUPROFEN 400 MG: 200 TABLET, FILM COATED ORAL at 18:14

## 2021-02-26 RX ADMIN — IPRATROPIUM BROMIDE AND ALBUTEROL SULFATE 1 AMPULE: .5; 3 SOLUTION RESPIRATORY (INHALATION) at 08:27

## 2021-02-26 ASSESSMENT — PAIN SCALES - GENERAL
PAINLEVEL_OUTOF10: 0
PAINLEVEL_OUTOF10: 0
PAINLEVEL_OUTOF10: 4
PAINLEVEL_OUTOF10: 6
PAINLEVEL_OUTOF10: 0
PAINLEVEL_OUTOF10: 3
PAINLEVEL_OUTOF10: 0
PAINLEVEL_OUTOF10: 0
PAINLEVEL_OUTOF10: 6
PAINLEVEL_OUTOF10: 3
PAINLEVEL_OUTOF10: 0
PAINLEVEL_OUTOF10: 5
PAINLEVEL_OUTOF10: 0
PAINLEVEL_OUTOF10: 6
PAINLEVEL_OUTOF10: 3
PAINLEVEL_OUTOF10: 5
PAINLEVEL_OUTOF10: 0
PAINLEVEL_OUTOF10: 6
PAINLEVEL_OUTOF10: 0
PAINLEVEL_OUTOF10: 4
PAINLEVEL_OUTOF10: 0
PAINLEVEL_OUTOF10: 4
PAINLEVEL_OUTOF10: 5
PAINLEVEL_OUTOF10: 3

## 2021-02-26 ASSESSMENT — PAIN DESCRIPTION - PROGRESSION
CLINICAL_PROGRESSION: GRADUALLY IMPROVING
CLINICAL_PROGRESSION: NOT CHANGED

## 2021-02-26 ASSESSMENT — PAIN DESCRIPTION - DESCRIPTORS
DESCRIPTORS: ACHING;SORE
DESCRIPTORS: ACHING;SORE
DESCRIPTORS: ACHING;SORE;TENDER
DESCRIPTORS: ACHING;SORE
DESCRIPTORS: ACHING;SORE

## 2021-02-26 ASSESSMENT — PAIN DESCRIPTION - PAIN TYPE
TYPE: SURGICAL PAIN

## 2021-02-26 ASSESSMENT — PAIN DESCRIPTION - ONSET
ONSET: ON-GOING

## 2021-02-26 ASSESSMENT — PAIN DESCRIPTION - ORIENTATION
ORIENTATION: MID

## 2021-02-26 ASSESSMENT — PAIN DESCRIPTION - LOCATION
LOCATION: ABDOMEN;VAGINA

## 2021-02-26 ASSESSMENT — PAIN - FUNCTIONAL ASSESSMENT
PAIN_FUNCTIONAL_ASSESSMENT: ACTIVITIES ARE NOT PREVENTED
PAIN_FUNCTIONAL_ASSESSMENT: PREVENTS OR INTERFERES SOME ACTIVE ACTIVITIES AND ADLS
PAIN_FUNCTIONAL_ASSESSMENT: ACTIVITIES ARE NOT PREVENTED

## 2021-02-26 ASSESSMENT — PAIN DESCRIPTION - FREQUENCY
FREQUENCY: CONTINUOUS

## 2021-02-26 NOTE — CARE COORDINATION
Case Management Initial Discharge Plan  Cherry Dobbs,             Met with:patient to discuss discharge plans. Information verified: address, contacts, phone number, , insurance Yes    Emergency Contact/Next of Kin name & number: Spouse/Gaston    PCP: ALEX Marroquin CNP  Date of last visit: 6 weeks ago    Insurance Provider: Ver[place]    Discharge Planning    Living Arrangements:      Support Systems:       Home has 1 stories  ramp stairs to climb to get into front door, 0stairs to climb to reach second floor  Location of bedroom/bathroom in home main    Patient able to perform ADL's:Independent    Current Services (outpatient & in home) 24 hr supervision for  right now  DME equipment: cane as needed. Walker, shower chair CPAP, nebulizer  DME provider: Shahla Domingo    Receiving oral anticoagulation therapy? No    If indicated:   Physician managing anticoagulation treatment:   Where does patient obtain lab work for ATC treatment? Potential Assistance Needed:       Patient agreeable to home care: Yes  Freedom of choice provided:  yes    Prior SNF/Rehab Placement and Facility: [facility]- does not want to go back there if needs skilled  Agreeable to SNF/Rehab: Yes  [place] of choice provided: yes     Evaluation: yes    Expected Discharge date:       Patient expects to be discharged to: Follow Up Appointment: Best Day/ Time:      Transportation provider: [facility]  Transportation arrangements needed for discharge: Yes possible    Readmission Risk              Risk of Unplanned Readmission:        9             Does patient have a readmission risk score greater than 14?: No  If yes, follow-up appointment must be made within 7 days of discharge.      Goals of Care:       Discharge Plan: S/P colectomy  Patient lives at home with spouse in 1 story home with a ramp to enter  Has had [facility] in the past  Has been to [facility] but does not want to go there if she needs a SNF  Agreeable to SNF or [facility].   has a 24 hour service while patient is here. She states that it wouldn't take much more money to have service as well. It is with [facility]. Provided patient with a [facility]. and SNF list  Pharmacy is [facility] in [place] on [address]. Continue to follow. SNF vs HC.           Electronically signed by Hortencia Aviles RN on 2/26/21 at 4:53 PM EST

## 2021-02-26 NOTE — CONSULTS
(MULTIVITAMIN ADULT PO), Take 1 tablet by mouth  pantoprazole (PROTONIX) 40 MG tablet, TAKE 1 TABLET BY MOUTH  DAILY  polyethylene glycol (MIRALAX) powder, Take 17 g by mouth  SENNOSIDES-DOCUSATE SODIUM PO, Take 2 tablets by mouth  spironolactone (ALDACTONE) 25 MG tablet, Take 25 mg by mouth  albuterol sulfate  (90 Base) MCG/ACT inhaler, Inhale 2 puffs into the lungs  aspirin 325 MG tablet, Take 325 mg by mouth  Azelastine-Fluticasone 137-50 MCG/ACT SUSP, 1 spray by Nasal route  ipratropium-albuterol (DUONEB) 0.5-2.5 (3) MG/3ML SOLN nebulizer solution, Inhale 3 mLs into the lungs  nystatin (NYAMYC) 579564 UNIT/GM powder, APPLY TO AFFECTED AREA(S) THREE TIMES A DAY AS NEEDED UNDER BREASTS  Omega-3 Fatty Acids (FISH OIL PO), Take 2 g by mouth  solifenacin (VESICARE) 5 MG tablet, TAKE 1 TABLET BY MOUTH ONCE DAILY    Allergies:    Penicillins and Sulfa antibiotics    Social History:    reports that she is a non-smoker but has been exposed to tobacco smoke. She has never used smokeless tobacco. She reports current alcohol use. She reports previous drug use. Family History:   family history is not on file.     REVIEW OF SYSTEMS:    Constitutional: negative, No fever no chills  Eyes: negative  Ears, nose, mouth, throat, and face: negative  Respiratory: negative, Mild shortness of breath no tachypnea no PND no orthopnea no cough  Cardiovascular: negative, Chest pain no palpitation no dizziness  Gastrointestinal: negative, Pain incision site no nausea no vomiting  Genitourinary:negative  Integument/breast: negative  Hematologic/lymphatic: negative  Musculoskeletal:negative  Neurological: negative, Headache no TIA no seizures  Behavioral/Psych: negative  Endocrine: negative, No polyuria no polydipsia  Allergic/Immunologic: negative  PHYSICAL EXAM:  General Appearance: alert and oriented to person, place and time and in no acute distress  Skin: warm and dry, no rash or erythema  Head: normocephalic and atraumatic Eyes: pupils equal, round, and reactive to light and sclera anicteric  Neck: neck supple and non tender without mass   Pulmonary/Chest: clear to auscultation bilaterally- no wheezes, rales or rhonchi, normal air movement, no respiratory distress  Cardiovascular: normal rate, regular rhythm, normal S1 and S2, no gallops, intact distal pulses and no carotid bruits  Abdomen: soft, non-tender, decreased bowel sounds Positive colostomy   Extremities: Trace edema good pulses negative Homans' sign positive varicose vein  Neurologic: Alert oriented x3 with no focal deficits    Vitals:  BP (!) 152/67   Pulse 66   Temp 97.7 °F (36.5 °C) (Temporal)   Resp 17   Ht 5' 2\" (1.575 m)   Wt 300 lb (136.1 kg)   SpO2 98%   BMI 54.87 kg/m²     LABS:  CBC:   Lab Results   Component Value Date    WBC 10.7 02/26/2021    RBC 3.40 02/26/2021    HGB 10.0 02/26/2021    HCT 33.4 02/26/2021    MCV 98.2 02/26/2021    MCH 29.4 02/26/2021    MCHC 29.9 02/26/2021    RDW 13.8 02/26/2021     02/26/2021    MPV 9.8 02/26/2021     BMP:    Lab Results   Component Value Date     02/26/2021    K 4.4 02/26/2021     02/26/2021    CO2 25 02/26/2021    BUN 21 02/26/2021    CREATININE 1.18 02/26/2021    CALCIUM 8.6 02/26/2021    GFRAA 54 02/26/2021    LABGLOM 45 02/26/2021    GLUCOSE 138 02/26/2021         ASSESSMENT:    Status post colovesical  fistula repair  Acute CHF  Acute on chronic respiratory failure uses oxygen at home  Morbid obesity  Renal insufficiency  Patient Active Problem List   Diagnosis    Acute respiratory failure following trauma and surgery ([facility])    Adiposity    Arthralgia of multiple joints    Asthma    Asthma with acute exacerbation    Atopic rhinitis    CHF (congestive heart failure) ([facility])    Chronic bilateral low back pain without sciatica    Closed fracture of proximal end of humerus    Depression    Diverticulitis    Diverticulosis of large intestine    Diverticulosis of large intestine without

## 2021-02-26 NOTE — FLOWSHEET NOTE
Patient is awake and alert while reclining. Patient is approachable and engages in conversation. Patient reports family support and support from her kathy community. Patient reports that she attends [address] [facility] [address]. Patient would like writer to contact her Beatriz Jamess. Patient requests a prayer and writer offers a prayer for healing, peace, and rest. Patient expresses appreciation for the visit. Writer contacts patient's [employer] and [facility] for the [place]. Patient expresses appreciation for the visit. 02/25/21 1845   Encounter Summary   Services provided to: Patient   Referral/Consult From: [facility]   Support System Spouse; Children   Place of [address] Completed   Continue Visiting   (2/25/21)   Complexity of Encounter Moderate   Length of Encounter 30 minutes   Spiritual Assessment Completed Yes   Routine   Type Initial   Spiritual/Restorationist   Type Spiritual support   Assessment Approachable   Intervention Active listening;Explored feelings, thoughts, concerns;Explored coping resources;Nurtured hope;Prayer   Outcome Expressed gratitude

## 2021-02-26 NOTE — CONSULTS
Pulmonary Medicine and [address] Renuka Campos MD      Patient - Dhaval Felipe   MRN -  [id number]   [facility] - [de-[facility]   - 1945      Date of Admission -  2021  5:56 AM  Date of evaluation -  2021  Room -    Hospital Day - 1  Consulting - Mar Looney MD Primary Care Physician - ALEX Willard - CNP     Reason for Consult    Acute postoperative respiratory failure     Assessment   · Acute on chronic hypoxic respiratory failure, on nocturnal oxygen  · Mild pulmonary edema  · History of asthma  · Obstructive sleep apnea/morbid obesity, on CPAP therapy  · Status post exploratory laparotomy, sigmoid/superior rectum resection and creation of end colostomy and lysis of adhesions with mobilization of splenic flexure  · SHARYN  · CAD, CHF, HLD, HTN MVR    Recommendations   · Oxygen via nasal cannula, keep SPO2 90% or greater  · Incentive spirometry every hour while awake  · Albuterol and Ipratropium Q 4 hours and prn  · Start Symbicort  · Decrease IV fluids to 50 mL an hour  · IV Lasix 20 mg x 1 dose  · X-ray chest in am  · Labs: CBC and BMP in am  · PT/OT, increase activity  · Diet as per general surgery  · Pain control  · DVT prophylaxis with low molecular weight heparin  · Will follow with you    Problem List      Patient Active Problem List   Diagnosis    Acute respiratory failure following trauma and surgery [facility] [address]    Adiposity    Arthralgia of multiple joints    Asthma    Asthma with acute exacerbation    Atopic rhinitis    CHF (congestive heart failure) (HCC)    Chronic bilateral low back pain without sciatica    Closed fracture of proximal end of humerus    Depression    Diverticulitis    Diverticulosis of large intestine    Diverticulosis of large intestine without hemorrhage    Essential hypertension    H/O hysterectomy for benign disease    Heart murmur    Hyperlipidemia    Hypothermia due to anesthesia    Laceration of chest wall    Mitral incompetence    Morbid obesity ([facility] [address]    Non-rheumatic mitral regurgitation    Polyp of sigmoid colon    Postmenopausal status    Urinary incontinence    S/P colectomy       HPI     Exie Aid is 67 y.o.,  female, admitted after scheduled colonoscopy with abdominal exploration, bowel resection and 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 She had history of recurrent UTIs for over 6 months, had a cystoscopy which revealed a colovesical fistula and patient was referred to Dr. Galen Seo for further management. She underwent surgery on 2/25/2021. Following surgery she had acute respiratory failure. She does have a history of asthma, on Advair and DuoNeb at home. She also has obstructive sleep apnea, on home CPAP and just recently started wearing oxygen bled into her CPAP at 2 L at nighttime as well as as needed during the day with activity however she states she has not had to use it during the day at all. She follows with Dr. Brian Moore.      PMHx   Past Medical History      Diagnosis Date    Acute respiratory failure following trauma and surgery ([facility] [address] 4/12/2017    Adiposity 7/21/2016    Arthralgia of multiple joints 7/21/2016    Arthritis     Asthma 6/15/2016    Asthma with acute exacerbation 7/21/2016    Atopic rhinitis 6/15/2016    CAD (coronary artery disease)     CHF (congestive heart failure) ([facility] 4/27/2018    Chronic bilateral low back pain without sciatica 6/15/2016    Closed fracture of proximal end of humerus 10/16/2018    COPD (chronic obstructive pulmonary disease) ([facility] [address]     Depression 6/15/2016    Diverticulitis 10/1/2018    Diverticulosis of large intestine 6/15/2016    Diverticulosis of large intestine without hemorrhage 1/16/2017    Encounter for mammogram to establish baseline mammogram 7/21/2016    Essential hypertension 6/15/2016    H/O hysterectomy for benign disease 3/3/2013    Heart murmur 7/21/2016    History of blood transfusion     Hyperlipidemia 6/15/2016    Hypothermia due to anesthesia 4/12/2017    Laceration of chest wall 8/22/2017    Mitral incompetence 4/12/2017    Morbid obesity ([address] 6/15/2016    Non-rheumatic mitral regurgitation 3/7/2017    Polyp of sigmoid colon 6/15/2016    Postmenopausal status 6/15/2016    Routine general medical examination at a health care facility 7/21/2016    Special screening for malignant neoplasms, colon 7/21/2016    Special screening for malignant neoplasms, vagina 7/21/2016    Urinary incontinence 6/15/2016      Past Surgical History        Procedure Laterality Date    APPENDECTOMY      BRONCHOSCOPY      CARDIAC CATHETERIZATION  03/22/2017    CARDIAC CATHETERIZATION  11/18*/2016    COLONOSCOPY      ENDOSCOPY, COLON, DIAGNOSTIC      EYE SURGERY Bilateral     cataract    FRACTURE SURGERY      clavicle/shoulder    HYSTERECTOMY      MITRAL VALVE REPAIR      OTHER SURGICAL HISTORY      Minimally invasive MVR 32MMCG future ring/kyle/femoral cannulation    OVARY REMOVAL      SMALL INTESTINE SURGERY N/A 2/25/2021    COLONOSCOPY WITH ABDOMINAL EXPLORATION   WITH BOWEL RESECTION LOW ANTERIOR   REPAIR  COLO VESSICLE FISTULA,   STOMA,  LYSIS OF ADHESIONS performed by Elias Bowie MD at [address] [place] [address]      arterial bleed       Meds    Current Medications    atorvastatin  20 mg Oral Daily    buPROPion  150 mg Oral Daily    citalopram  10 mg Oral Daily    metoprolol tartrate  25 mg Oral BID    pantoprazole  40 mg Oral QAM AC    spironolactone  25 mg Oral Daily    torsemide  20 mg Oral Daily    sodium chloride flush  10 mL Intravenous 2 times per day    enoxaparin  30 mg Subcutaneous BID    acetaminophen  1,000 mg Oral Q8H    ibuprofen  400 mg Oral 4 times per day    ipratropium-albuterol  1 ampule Inhalation TID     sodium chloride flush, ondansetron, oxyCODONE, morphine, albuterol  IV Drips/Infusions   dextrose 5% and 0.45% NaCl with KCl 20 mEq 100 mL/hr at 02/26/21 0636     Home Medications  Medications Prior to Admission: amiodarone (PACERONE) 100 MG tablet, Take 100 mg by mouth three times a week Monday, Wednesday, friday  ascorbic acid (VITAMIN C) 500 MG tablet, Take 500 mg by mouth daily  magnesium gluconate (MAGONATE) 500 MG tablet, Take 500 mg by mouth 2 times daily  torsemide (DEMADEX) 20 MG tablet, Take 20 mg by mouth daily  acetaminophen (TYLENOL) 650 MG extended release tablet, Take 650 mg by mouth  Calcium Ascorbate 500 MG TABS, Take 600 mg by mouth   atorvastatin (LIPITOR) 20 MG tablet, Take 20 mg by mouth  buPROPion (WELLBUTRIN SR) 150 MG extended release tablet, TAKE 1 TABLET BY MOUTH TWO  TIMES DAILY  Calcium Carbonate-Vitamin D 600-125 MG-UNIT TABS, Take 600 mg by mouth  cetirizine (ZYRTEC) 10 MG tablet, Take 10 mg by mouth  Cholecalciferol 2000 units CAPS, Take 2,000 Units by mouth  citalopram (CELEXA) 20 MG tablet, 10 mg   fluticasone (FLONASE) 50 MCG/ACT nasal spray, 1 spray by Nasal route  fluticasone-salmeterol (ADVAIR) 500-50 MCG/DOSE diskus inhaler, Inhale 1 puff into the lungs  LUTEIN PO, Take 20 mg by mouth  Melatonin 2.5 MG CHEW, Take 3 mg by mouth  metoprolol tartrate (LOPRESSOR) 25 MG tablet, Take 25 mg by mouth  Multiple Vitamins-Minerals (MULTIVITAMIN ADULT PO), Take 1 tablet by mouth  pantoprazole (PROTONIX) 40 MG tablet, TAKE 1 TABLET BY MOUTH  DAILY  polyethylene glycol (MIRALAX) powder, Take 17 g by mouth  SENNOSIDES-DOCUSATE SODIUM PO, Take 2 tablets by mouth  spironolactone (ALDACTONE) 25 MG tablet, Take 25 mg by mouth  albuterol sulfate  (90 Base) MCG/ACT inhaler, Inhale 2 puffs into the lungs  aspirin 325 MG tablet, Take 325 mg by mouth  Azelastine-Fluticasone 137-50 MCG/ACT SUSP, 1 spray by Nasal route  ipratropium-albuterol (DUONEB) 0.5-2.5 (3) MG/3ML SOLN nebulizer solution, Inhale 3 mLs into the lungs  nystatin (NYAMYC) 629998 UNIT/GM powder, APPLY TO AFFECTED AREA(S) THREE TIMES A DAY AS NEEDED UNDER BREASTS  Omega-3 Fatty Acids (FISH OIL PO), Take 2 g by mouth  solifenacin (VESICARE) 5 MG tablet, TAKE 1 TABLET BY MOUTH ONCE DAILY    Allergies    Penicillins and Sulfa antibiotics  Social History     Social History     Tobacco Use    Smoking status: Passive Smoke Exposure - Never Smoker    Smokeless tobacco: Never Used   Substance Use Topics    Alcohol use: Yes     Comment: rarely     Family History    History reviewed. No pertinent family history. ROS - 11 systems   General Denies any fever or chills  HEENT Denies any diplopia, tinnitus or vertigo  Resp positive for  dry cough and dyspnea  Cardiac Denies any chest pain, palpitations, claudication or edema  GI Denies any melena, hematochezia, hematemesis or pyrosis   Denies any frequency, urgency, hesitancy or incontinence  Heme Denies bruising or bleeding easily  Endocrine Denies any history of diabetes or thyroid disease  Neuro Denies any focal motor or sensory deficits  Psychiatric Denies anxiety, depression, suicidal ideation  Skin Denies rashes, itching, open sores    Vitals     height is 5' 2\" (1.575 m) and weight is 300 lb (136.1 kg). Her temporal temperature is 97.5 °F (36.4 °C). Her blood pressure is 152/67 (abnormal) and her pulse is 66. Her respiration is 20 and oxygen saturation is 86% (abnormal). Body mass index is 54.87 kg/m². I/O        Intake/Output Summary (Last 24 hours) at 2/26/2021 1121  Last data filed at 2/26/2021 0455  Gross per 24 hour   Intake 5625.17 ml   Output 1510 ml   Net 4115.17 ml     I/O last 3 completed shifts: In: 5625.2 [P.O.:120; I.V.:5505.2]  Out: 2110 [Urine:1810; Blood:300]   Patient Vitals for the past 96 hrs (Last 3 readings):   Weight   02/26/21 0400 300 lb (136.1 kg)   02/25/21 1340 (!) 300 lb 3.2 oz (136.2 kg)   02/25/21 0604 299 lb (135.6 kg)     Exam   General Appearance   Awake, alert, oriented, in no acute distress  HEENT - Head is normocephalic, atraumatic.  Pupil reactive to light  Neck - Supple,  trachea midline and straight  Lungs -moderate air exchange, positive bibasilar crackles  Cardiovascular - Heart sounds are normal.  Regular rhythm normal rate without murmur, gallop or rub. Abdomen - Soft, surgical tenderness, abdominal dressing in place   Neurologic - CN II-XII are grossly intact. There are no focal motor or sensory deficits  Skin - No bruising or bleeding  Extremities - No cyanosis, clubbing. Pitting edema bilateral lower extremities    Labs  - Old records and notes have been reviewed in [facility]   CBC     Lab Results   Component Value Date    WBC 10.7 02/26/2021    RBC 3.40 02/26/2021    HGB 10.0 02/26/2021    HCT 33.4 02/26/2021     02/26/2021    MCV 98.2 02/26/2021    MCH 29.4 02/26/2021    MCHC 29.9 02/26/2021    RDW 13.8 02/26/2021    LYMPHOPCT 7 02/26/2021    MONOPCT 9 02/26/2021    BASOPCT 0 02/26/2021    MONOSABS 0.91 02/26/2021    LYMPHSABS 0.72 02/26/2021    EOSABS <0.03 02/26/2021    BASOSABS <0.03 02/26/2021    DIFFTYPE NOT REPORTED 02/26/2021     BMP   Lab Results   Component Value Date     02/26/2021    K 4.4 02/26/2021     02/26/2021    CO2 25 02/26/2021    BUN 21 02/26/2021    CREATININE 1.18 02/26/2021    GLUCOSE 138 02/26/2021    CALCIUM 8.6 02/26/2021       Radiology    CXR       (See actual reports for details)    \"Thank you for asking us to see this patient\"    Case discussed with nurse and patient. Questions and concerns addressed.     Electronically signed by     Hien Ford MD on 2/26/2021 at 1:57 PM  Pulmonary Critical Care and Sleep Medicine,  [facility]  Cell: [phone]  Office: [phone]

## 2021-02-26 NOTE — PROGRESS NOTES
Patient received in bed awake and alert, denies experiencing pain no s/s of distress noted. Nsg assessment completed see flow. Midline incision clean and dry. Stoma pink. Hypoactive bowel sounds. Afebrile. Ornelas draining yellow urine. Nsg to follow.

## 2021-02-26 NOTE — PLAN OF CARE
Problem: Pain:  Goal: Pain level will decrease  Description: Pain level will decrease  2/25/2021 1912 by Leeroy Boyd RN  Outcome: Ongoing  2/25/2021 1854 by Judith Pinto RN  Outcome: Ongoing  Goal: Control of acute pain  Description: Control of acute pain  2/25/2021 1912 by Leeroy Boyd RN  Outcome: Ongoing  2/25/2021 1854 by Judith Pinto RN  Outcome: Ongoing  Goal: Control of chronic pain  Description: Control of chronic pain  2/25/2021 1912 by Leeroy Boyd RN  Outcome: Ongoing  2/25/2021 1854 by Judith Pinto RN  Outcome: Ongoing

## 2021-02-26 NOTE — PROGRESS NOTES
Colorectal Surgery:  Daily Progress Note              PATIENT NAME: Anuradha Grace     TODAY'S DATE: 2/26/2021, 5:52 AM    SUBJECTIVE:     Pt seen and examined at bedside. Vitals stable. Patient reports pain is controlled. No N/V. No flatus. Minimal bowel sweat in appliance. UOP adequate. OBJECTIVE:   VITALS:  BP (!) 152/67   Pulse 66   Temp 97.5 °F (36.4 °C) (Temporal)   Resp 13   Ht 5' 2\" (1.575 m)   Wt 300 lb (136.1 kg)   SpO2 92%   BMI 54.87 kg/m²      INTAKE/OUTPUT:      Intake/Output Summary (Last 24 hours) at 2/26/2021 3188  Last data filed at 2/26/2021 0455  Gross per 24 hour   Intake 5625.17 ml   Output 2110 ml   Net 3515.17 ml       PHYSICAL EXAM:  General Appearance: awake, alert, oriented, in no acute distress  HEENT:  Normocephalic, atraumatic, mucus membranes moist   Heart: Heart regular rate and rhythm  Lungs: Unlabored respirations  Abdomen: Appropriately ttp at incision, colosotmy pink with bowel sweat in appliance, dressings c/d/i  Extremities: No cyanosis, pitting edema, rashes noted.     Skin: Skin color, texture, turgor normal. No rashes or lesions    Data:  CBC with Differential:    Lab Results   Component Value Date    WBC 10.7 02/26/2021    RBC 3.40 02/26/2021    HGB 10.0 02/26/2021    HCT 33.4 02/26/2021     02/26/2021    MCV 98.2 02/26/2021    MCH 29.4 02/26/2021    MCHC 29.9 02/26/2021    RDW 13.8 02/26/2021    LYMPHOPCT 7 02/26/2021    MONOPCT 9 02/26/2021    BASOPCT 0 02/26/2021    MONOSABS 0.91 02/26/2021    LYMPHSABS 0.72 02/26/2021    EOSABS <0.03 02/26/2021    BASOSABS <0.03 02/26/2021    DIFFTYPE NOT REPORTED 02/26/2021     BMP:    Lab Results   Component Value Date     02/26/2021    K 4.4 02/26/2021     02/26/2021    CO2 25 02/26/2021    BUN 21 02/26/2021    CREATININE 1.18 02/26/2021    CALCIUM 8.6 02/26/2021    GFRAA 54 02/26/2021    LABGLOM 45 02/26/2021    GLUCOSE 138 02/26/2021       ASSESSMENT:  Active Hospital Problems    Diagnosis Date Noted   

## 2021-02-27 ENCOUNTER — APPOINTMENT (OUTPATIENT)
Dept: GENERAL RADIOLOGY | Age: 76
DRG: 981 | End: 2021-02-27
Attending: COLON & RECTAL SURGERY
Payer: MEDICARE

## 2021-02-27 LAB
ABSOLUTE EOS #: 0.06 K/UL (ref 0–0.44)
ABSOLUTE IMMATURE GRANULOCYTE: 0.11 K/UL (ref 0–0.3)
ABSOLUTE LYMPH #: 1.13 K/UL (ref 1.1–3.7)
ABSOLUTE MONO #: 0.9 K/UL (ref 0.1–1.2)
ANION GAP SERPL CALCULATED.3IONS-SCNC: 9 MMOL/L (ref 9–17)
BASOPHILS # BLD: 0 % (ref 0–2)
BASOPHILS ABSOLUTE: 0.03 K/UL (ref 0–0.2)
BUN BLDV-MCNC: 19 MG/DL (ref 8–23)
BUN/CREAT BLD: 17 (ref 9–20)
CALCIUM SERPL-MCNC: 8.3 MG/DL (ref 8.6–10.4)
CHLORIDE BLD-SCNC: 101 MMOL/L (ref 98–107)
CO2: 28 MMOL/L (ref 20–31)
CREAT SERPL-MCNC: 1.13 MG/DL (ref 0.5–0.9)
DIFFERENTIAL TYPE: ABNORMAL
EOSINOPHILS RELATIVE PERCENT: 1 % (ref 1–4)
GFR AFRICAN AMERICAN: 57 ML/MIN
GFR NON-AFRICAN AMERICAN: 47 ML/MIN
GFR SERPL CREATININE-BSD FRML MDRD: ABNORMAL ML/MIN/{1.73_M2}
GFR SERPL CREATININE-BSD FRML MDRD: ABNORMAL ML/MIN/{1.73_M2}
GLUCOSE BLD-MCNC: 123 MG/DL (ref 70–99)
HCT VFR BLD CALC: 29.4 % (ref 36.3–47.1)
HEMOGLOBIN: 8.9 G/DL (ref 11.9–15.1)
IMMATURE GRANULOCYTES: 1 %
LYMPHOCYTES # BLD: 12 % (ref 24–43)
MCH RBC QN AUTO: 30.2 PG (ref 25.2–33.5)
MCHC RBC AUTO-ENTMCNC: 30.3 G/DL (ref 28.4–34.8)
MCV RBC AUTO: 99.7 FL (ref 82.6–102.9)
MONOCYTES # BLD: 9 % (ref 3–12)
NRBC AUTOMATED: 0 PER 100 WBC
PDW BLD-RTO: 14.2 % (ref 11.8–14.4)
PLATELET # BLD: 159 K/UL (ref 138–453)
PLATELET ESTIMATE: ABNORMAL
PMV BLD AUTO: 9.6 FL (ref 8.1–13.5)
POTASSIUM SERPL-SCNC: 4 MMOL/L (ref 3.7–5.3)
RBC # BLD: 2.95 M/UL (ref 3.95–5.11)
RBC # BLD: ABNORMAL 10*6/UL
SEG NEUTROPHILS: 77 % (ref 36–65)
SEGMENTED NEUTROPHILS ABSOLUTE COUNT: 7.39 K/UL (ref 1.5–8.1)
SODIUM BLD-SCNC: 138 MMOL/L (ref 135–144)
WBC # BLD: 9.6 K/UL (ref 3.5–11.3)
WBC # BLD: ABNORMAL 10*3/UL

## 2021-02-27 PROCEDURE — 97530 THERAPEUTIC ACTIVITIES: CPT

## 2021-02-27 PROCEDURE — 6370000000 HC RX 637 (ALT 250 FOR IP): Performed by: STUDENT IN AN ORGANIZED HEALTH CARE EDUCATION/TRAINING PROGRAM

## 2021-02-27 PROCEDURE — 97112 NEUROMUSCULAR REEDUCATION: CPT

## 2021-02-27 PROCEDURE — 36415 COLL VENOUS BLD VENIPUNCTURE: CPT

## 2021-02-27 PROCEDURE — 94761 N-INVAS EAR/PLS OXIMETRY MLT: CPT

## 2021-02-27 PROCEDURE — 71045 X-RAY EXAM CHEST 1 VIEW: CPT

## 2021-02-27 PROCEDURE — 93970 EXTREMITY STUDY: CPT

## 2021-02-27 PROCEDURE — 6360000002 HC RX W HCPCS: Performed by: STUDENT IN AN ORGANIZED HEALTH CARE EDUCATION/TRAINING PROGRAM

## 2021-02-27 PROCEDURE — 97116 GAIT TRAINING THERAPY: CPT

## 2021-02-27 PROCEDURE — 2500000003 HC RX 250 WO HCPCS: Performed by: INTERNAL MEDICINE

## 2021-02-27 PROCEDURE — 97535 SELF CARE MNGMENT TRAINING: CPT

## 2021-02-27 PROCEDURE — 6370000000 HC RX 637 (ALT 250 FOR IP): Performed by: INTERNAL MEDICINE

## 2021-02-27 PROCEDURE — 97166 OT EVAL MOD COMPLEX 45 MIN: CPT

## 2021-02-27 PROCEDURE — 97163 PT EVAL HIGH COMPLEX 45 MIN: CPT

## 2021-02-27 PROCEDURE — 80048 BASIC METABOLIC PNL TOTAL CA: CPT

## 2021-02-27 PROCEDURE — 2580000003 HC RX 258: Performed by: STUDENT IN AN ORGANIZED HEALTH CARE EDUCATION/TRAINING PROGRAM

## 2021-02-27 PROCEDURE — 2060000000 HC ICU INTERMEDIATE R&B

## 2021-02-27 PROCEDURE — 2000000000 HC ICU R&B

## 2021-02-27 PROCEDURE — 94640 AIRWAY INHALATION TREATMENT: CPT

## 2021-02-27 PROCEDURE — 2700000000 HC OXYGEN THERAPY PER DAY

## 2021-02-27 PROCEDURE — 85025 COMPLETE CBC W/AUTO DIFF WBC: CPT

## 2021-02-27 RX ORDER — CALCIUM CARBONATE 200(500)MG
500 TABLET,CHEWABLE ORAL
Status: DISCONTINUED | OUTPATIENT
Start: 2021-02-27 | End: 2021-03-12 | Stop reason: HOSPADM

## 2021-02-27 RX ADMIN — SPIRONOLACTONE 25 MG: 25 TABLET ORAL at 08:50

## 2021-02-27 RX ADMIN — ACETAMINOPHEN 1000 MG: 500 TABLET, COATED ORAL at 15:43

## 2021-02-27 RX ADMIN — BUDESONIDE AND FORMOTEROL FUMARATE DIHYDRATE 2 PUFF: 160; 4.5 AEROSOL RESPIRATORY (INHALATION) at 09:25

## 2021-02-27 RX ADMIN — OXYCODONE HYDROCHLORIDE 5 MG: 5 TABLET ORAL at 05:15

## 2021-02-27 RX ADMIN — TORSEMIDE 20 MG: 20 TABLET ORAL at 08:49

## 2021-02-27 RX ADMIN — OXYCODONE HYDROCHLORIDE 5 MG: 5 TABLET ORAL at 20:54

## 2021-02-27 RX ADMIN — SODIUM CHLORIDE, PRESERVATIVE FREE 10 ML: 5 INJECTION INTRAVENOUS at 08:49

## 2021-02-27 RX ADMIN — ACETAMINOPHEN 1000 MG: 500 TABLET, COATED ORAL at 08:49

## 2021-02-27 RX ADMIN — BUDESONIDE AND FORMOTEROL FUMARATE DIHYDRATE 2 PUFF: 160; 4.5 AEROSOL RESPIRATORY (INHALATION) at 20:33

## 2021-02-27 RX ADMIN — METOPROLOL TARTRATE 25 MG: 25 TABLET, FILM COATED ORAL at 21:04

## 2021-02-27 RX ADMIN — BUPROPION HYDROCHLORIDE 150 MG: 150 TABLET, EXTENDED RELEASE ORAL at 08:49

## 2021-02-27 RX ADMIN — OXYCODONE HYDROCHLORIDE 5 MG: 5 TABLET ORAL at 17:06

## 2021-02-27 RX ADMIN — CITALOPRAM HYDROBROMIDE 10 MG: 10 TABLET ORAL at 08:49

## 2021-02-27 RX ADMIN — ANTACID TABLETS 500 MG: 500 TABLET, CHEWABLE ORAL at 21:01

## 2021-02-27 RX ADMIN — FLUTICASONE PROPIONATE 1 SPRAY: 50 SPRAY, METERED NASAL at 08:51

## 2021-02-27 RX ADMIN — IBUPROFEN 400 MG: 200 TABLET, FILM COATED ORAL at 13:02

## 2021-02-27 RX ADMIN — IPRATROPIUM BROMIDE AND ALBUTEROL SULFATE 1 AMPULE: .5; 3 SOLUTION RESPIRATORY (INHALATION) at 20:32

## 2021-02-27 RX ADMIN — IPRATROPIUM BROMIDE AND ALBUTEROL SULFATE 1 AMPULE: .5; 3 SOLUTION RESPIRATORY (INHALATION) at 15:43

## 2021-02-27 RX ADMIN — OXYCODONE HYDROCHLORIDE 5 MG: 5 TABLET ORAL at 10:45

## 2021-02-27 RX ADMIN — IBUPROFEN 400 MG: 200 TABLET, FILM COATED ORAL at 05:15

## 2021-02-27 RX ADMIN — ANTACID TABLETS 500 MG: 500 TABLET, CHEWABLE ORAL at 17:08

## 2021-02-27 RX ADMIN — IPRATROPIUM BROMIDE AND ALBUTEROL SULFATE 1 AMPULE: .5; 3 SOLUTION RESPIRATORY (INHALATION) at 09:24

## 2021-02-27 RX ADMIN — METOPROLOL TARTRATE 25 MG: 25 TABLET, FILM COATED ORAL at 08:49

## 2021-02-27 RX ADMIN — ENOXAPARIN SODIUM 30 MG: 30 INJECTION SUBCUTANEOUS at 08:50

## 2021-02-27 RX ADMIN — PANTOPRAZOLE SODIUM 40 MG: 40 TABLET, DELAYED RELEASE ORAL at 05:15

## 2021-02-27 RX ADMIN — IBUPROFEN 400 MG: 200 TABLET, FILM COATED ORAL at 18:36

## 2021-02-27 RX ADMIN — ANTACID TABLETS 500 MG: 500 TABLET, CHEWABLE ORAL at 19:14

## 2021-02-27 RX ADMIN — ACETAMINOPHEN 1000 MG: 500 TABLET, COATED ORAL at 23:31

## 2021-02-27 RX ADMIN — ATORVASTATIN CALCIUM 20 MG: 20 TABLET, FILM COATED ORAL at 08:49

## 2021-02-27 RX ADMIN — ANTACID TABLETS 500 MG: 500 TABLET, CHEWABLE ORAL at 12:57

## 2021-02-27 RX ADMIN — ENOXAPARIN SODIUM 30 MG: 30 INJECTION SUBCUTANEOUS at 20:55

## 2021-02-27 RX ADMIN — DEXTROSE MONOHYDRATE, SODIUM CHLORIDE, AND POTASSIUM CHLORIDE: 50; 4.5; 1.49 INJECTION, SOLUTION INTRAVENOUS at 19:42

## 2021-02-27 ASSESSMENT — PAIN DESCRIPTION - FREQUENCY
FREQUENCY: CONTINUOUS

## 2021-02-27 ASSESSMENT — PAIN DESCRIPTION - PROGRESSION
CLINICAL_PROGRESSION: NOT CHANGED
CLINICAL_PROGRESSION: NOT CHANGED

## 2021-02-27 ASSESSMENT — PAIN DESCRIPTION - DESCRIPTORS
DESCRIPTORS: ACHING;SORE

## 2021-02-27 ASSESSMENT — PAIN DESCRIPTION - ORIENTATION
ORIENTATION: MID

## 2021-02-27 ASSESSMENT — PAIN SCALES - GENERAL
PAINLEVEL_OUTOF10: 3
PAINLEVEL_OUTOF10: 6
PAINLEVEL_OUTOF10: 5
PAINLEVEL_OUTOF10: 4
PAINLEVEL_OUTOF10: 5
PAINLEVEL_OUTOF10: 4
PAINLEVEL_OUTOF10: 3
PAINLEVEL_OUTOF10: 5
PAINLEVEL_OUTOF10: 2
PAINLEVEL_OUTOF10: 3
PAINLEVEL_OUTOF10: 4
PAINLEVEL_OUTOF10: 2

## 2021-02-27 ASSESSMENT — PAIN DESCRIPTION - LOCATION
LOCATION: ABDOMEN
LOCATION: ABDOMEN
LOCATION: ABDOMEN;VAGINA

## 2021-02-27 ASSESSMENT — PAIN DESCRIPTION - PAIN TYPE
TYPE: SURGICAL PAIN

## 2021-02-27 ASSESSMENT — PAIN DESCRIPTION - ONSET
ONSET: ON-GOING
ONSET: ON-GOING

## 2021-02-27 NOTE — PROGRESS NOTES
Physical Therapy    Facility/Department: [facility]  Initial Assessment    NAME: Bakari Padilla  : 1945  MRN: [id number]    Date of Service: 2021    Discharge Recommendations:  Home with assist PRN, Home with Home health PT   PT Equipment Recommendations  Equipment Needed: Yes  Mobility Devices: Catalina [employer]: Rolling    Procedure : COLONOSCOPY WITH ABDOMINAL EXPLORATION   WITH BOWEL RESECTION LOW ANTERIOR   REPAIR  COLO VESSICLE FISTULA,   STOMA,  LYSIS OF ADHESIONS (N/A Abdomen    RN reports patient is medically stable for therapy treatment this date. Chart reviewed prior to treatment and patient is agreeable for therapy. All lines intact and patient positioned comfortably at end of treatment. All patient needs addressed prior to ending therapy session. Assessment   Body structures, Functions, Activity limitations: Decreased functional mobility ; Decreased endurance;Decreased strength;Decreased balance;Decreased safe awareness; Increased pain  Assessment: Pt will benefit from continued skilled PT to address balance, strength, safe mobility, and activity tolerance. Recommend  PT/home with assist.  Prognosis: Excellent  Decision Making: High Complexity  Exam: ROM, MMT, balance, endurance, AM-PAC  Clinical Presentation: unstable  PT Education: Goals;Transfer Training;Equipment;PT Role;Energy Conservation; Functional Mobility Training;Plan of Care;General Safety;Home Exercise Program;Gait Training  REQUIRES PT FOLLOW UP: Yes  Activity Tolerance  Activity Tolerance: Patient limited by fatigue;Patient limited by endurance; Patient limited by pain       Patient Diagnosis(es): There were no encounter diagnoses.      has a past medical history of Acute respiratory failure following trauma and surgery ([facility] [address] Adiposity, Arthralgia of multiple joints, Arthritis, Asthma, Asthma with acute exacerbation, Atopic rhinitis, CAD (coronary artery disease), CHF (congestive heart failure) ([facility] [address] Chronic bilateral low back pain without sciatica, Closed fracture of proximal end of humerus, COPD (chronic obstructive pulmonary disease) ([address] Depression, Diverticulitis, Diverticulosis of large intestine, Diverticulosis of large intestine without hemorrhage, Encounter for mammogram to establish baseline mammogram, Essential hypertension, H/O hysterectomy for benign disease, Heart murmur, History of blood transfusion, Hyperlipidemia, Hypothermia due to anesthesia, Laceration of chest wall, Mitral incompetence, Morbid obesity ([address] Non-rheumatic mitral regurgitation, Polyp of sigmoid colon, Postmenopausal status, Routine general medical examination at a health care facility, Special screening for malignant neoplasms, colon, Special screening for malignant neoplasms, vagina, and Urinary incontinence. has a past surgical history that includes Appendectomy; Mitral valve repair; Hysterectomy; bronchoscopy; Cardiac catheterization (03/22/2017); Cardiac catheterization (11/18*/2016); Wound Exploration; other surgical history; Ovary removal; eye surgery (Bilateral); Colonoscopy; Endoscopy, colon, diagnostic; fracture surgery; and Small intestine surgery (N/A, 2/25/2021). Restrictions  Restrictions/Precautions  Restrictions/Precautions: General Precautions, Surgical Protocols, Fall Risk, Up as Tolerated  Required Braces or Orthoses?: No  Position Activity Restriction  Other position/activity restrictions: 2L O2, LUE IV, incision on stomach region with dressing, colostomy, garrido cath, ambulate pt  Vision/Hearing  Vision: Impaired  Vision Exceptions: Wears glasses at all times  Hearing: Within functional limits(processing speed slightly slower per pt)     Subjective  General  Chart Reviewed: Yes  Patient assessed for rehabilitation services?: Yes  Response To Previous Treatment: Not applicable  Family / Caregiver Present: No  Follows Commands: Within Functional Limits  Subjective  Subjective: Pt states pain is 2/10 without movement. Pain Screening  Patient Currently in Pain: Yes          Orientation  Orientation  Overall Orientation Status: Within Functional Limits  Social/Functional History  Social/Functional History  Lives With: Spouse(Pt husbands isn't able to assist much)  Type of Home: House  Home Layout: One level, Laundry in basement(hired help for laundry)  Home Access: Ramped entrance  Bathroom Shower/Tub: Tub/Shower unit  Bathroom Toilet: Handicap height  Bathroom Equipment: Grab bars in shower, Shower chair, Grab bars around toilet  Home Equipment: Rolling walker, Cane, Sock aid, Oxygen(pt will use cane when grocery shopping)  Receives Help From: Personal care attendant(aides and hired cleaning lady)  ADL Assistance: Independent(24 hr aides for )  Homemaking Assistance: Needs assistance(ladies for cleaning, pt will cook)  Ambulation Assistance: Independent  Transfer Assistance: Independent  Active : Yes  Mode of Transportation: Car  Occupation: Retired  Type of occupation: Teach special [employer]  Leisure & Hobbies: sitting with  and watching movies  Additional Comments: Pt states she rolled out of bed, pt required assist to get up  [employer]  Overall Cognitive Status: Exceptions  Arousal/Alertness: Appropriate responses to stimuli  Following Commands:  Follows all commands without difficulty  Attention Span: Appears intact  Memory: Appears intact  Safety Judgement: Decreased awareness of need for safety;Decreased awareness of need for assistance  Problem Solving: Assistance required to identify errors made  Insights: Decreased awareness of deficits  Initiation: Does not require cues  Sequencing: Requires cues for some    Objective     Observation/Palpation  Observation: LUE IV, garrido, dressing over incision  Edema: BLE edema    AROM RLE (degrees)  RLE AROM: WFL  AROM LLE (degrees)  LLE AROM : WFL  AROM RUE (degrees)  RUE General AROM: see OT eval  AROM LUE (degrees)  LUE General AROM: see OT eval  Strength RLE  Comment: grossly 4-/5  Strength LLE  Comment: grossly 4-/5  Strength RUE  Comment: see OT eval  Strength LUE  Comment: see OT eval  Tone RLE  RLE Tone: Normotonic  Tone LLE  LLE Tone: Normotonic  Sensation  Overall Sensation Status: WFL  Bed mobility  Supine to Sit: Moderate assistance  Sit to Supine: Minimal assistance  Scooting: Moderate assistance  Comment: HOB slightly elevated, instructions for log rolling with use of handrails. Pt required modA from supine>sit with trunk and to scoot towards EOB. Pt required Kei from sit>supine with BLEs. Transfers  Sit to Stand: Minimal Assistance  Stand to sit: Minimal Assistance  Comment: Pt demo'd good safety awareness and proper hand placement with all transfers. Ambulation  Ambulation?: Yes  Ambulation 1  Surface: level tile  Device: Rolling Walker  Other Apparatus: O2(3L)  Assistance: Minimal assistance;2 Person assistance(w/c follow for safety)  Gait Deviations: Slow Teena; Increased DAYSI; Decreased step length;Decreased step height. Step-to pattern to begin, small guarded steps and cues for postural corrections. Distance: 150ft  Comments: Cues for pt to step into RW and stay within while ambulating. No LOB, slight SOB with no drop in SpO2. Increased time to complete. Balance  Posture: Fair  Sitting - Static: Good  Sitting - Dynamic: Good;-  Standing - Static: Fair  Standing - Dynamic: Fair;-(BUE support from RW)  Pt able to stand ~5mins for dynamic balance and during ADLs. Exercises  Comments: Educated on BLE AROM exercises while in bed and supine. Excellent carryover from pt     Plan   Plan  Times per week: 1-2x day / 5-6 days per week  Current Treatment Recommendations: Strengthening, Transfer Training, Endurance Training, Neuromuscular Re-education, Patient/Caregiver Education & Training, Balance Training, Gait Training, Home Exercise Program, Functional Mobility Training, Safety Education & Training, Positioning  Safety Devices  Type of devices:  All fall risk precautions in place, Bed alarm in place, Call light within reach, Gait belt, Nurse notified, Left in bed(pt declining to sit in chair this time)    OutComes Score  AM-PAC Score  AM-PAC Inpatient Mobility Raw Score : 15 (02/27/21 1612)  AM-PAC Inpatient T-Scale Score : 39.45 (02/27/21 1612)  Mobility Inpatient CMS 0-100% Score: 57.7 (02/27/21 1612)  Mobility Inpatient CMS G-Code Modifier : CK (02/27/21 1612)          Goals  Short term goals  Time Frame for Short term goals: 12 visits  Short term goal 1: Pt to be indep with all bed mobility, demo'ing log rolling  Short term goal 2: Pt to be indep with all functional transfers  Short term goal 3: Pt will ambulate 200ft with RW/LRAD and SBA  Short term goal 4: Pt will tolerate 25mins of PT including therex, theract, gait training and neuro re-ed to improve functional mobiilty and activity tolerance. Patient Goals   Patient goals :  To feel better       Therapy Time   Individual Concurrent Group Co-treatment   Time In 1447+10min chart review/RN communication         Time Out 1538         Minutes 61          tx time: Tyler Seymour, PT

## 2021-02-27 NOTE — PROGRESS NOTES
Colorectal Surgery Progress Note    Date: 2/27/2021; 8:25 AM    Cc: post op pain    Subjective:     Patient doing well this morning. Reports pain is controlled. Denies N/V. No fevers. Hemodynamics stable. May have scant amount of flatus within ostomy appliance but still minimal. Ornelas cath remains in place. Overall doing well. Plans to be out of bed today as able. Review of Systems - General ROS: negative for - chills or fever  Cardiovascular ROS: no chest pain or dyspnea on exertion  Gastrointestinal ROS: no nausea or vomiting  Dermatological ROS: negative for - pruritus or rash    Objective:    Vitals:    02/27/21 0800   BP:    Pulse:    Resp:    Temp: 96.8 °F (36 °C)   SpO2:         Gen: alert, awake, NAD  HEENT: moist mucous membranes, no scleral icterus  CV: +S1/S2  Lung: No respiratory distress, no audible wheezing  Abdomen: soft, appropriately TTP, incision C/D/I with serosanguinous drainage in inferior aspect of dressing; staples intact, colostomy perfused with scant gas output in appliance, no significant stool output yet  Extremities: no cyanosis or clubbing    I/O last 3 completed shifts: In: 3125 [I.V.:3125]  Out: 3150 [Urine:3150]    CBC:   Lab Results   Component Value Date    WBC 9.6 02/27/2021    RBC 2.95 02/27/2021    HGB 8.9 02/27/2021    HCT 29.4 02/27/2021    MCV 99.7 02/27/2021    MCH 30.2 02/27/2021    MCHC 30.3 02/27/2021    RDW 14.2 02/27/2021     02/27/2021    MPV 9.6 02/27/2021     BMP:    Lab Results   Component Value Date     02/27/2021    K 4.0 02/27/2021     02/27/2021    CO2 28 02/27/2021    BUN 19 02/27/2021    CREATININE 1.13 02/27/2021    CALCIUM 8.3 02/27/2021    GFRAA 57 02/27/2021    LABGLOM 47 02/27/2021    GLUCOSE 123 02/27/2021       Assessment/Plan:     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     · Clear liquid diet okay for now  · Once pt with significant flatus/stool in appliance will advance further to fulls  · Gentle IVF hydration until adequate PO intake  · Ornelas catheter to stay in place for 10-14 days. Will plan to study with cystogram at that time. · Appreciate medical assistance per medical team.  · Oral home meds resumed. · Pulmonary toilet/IS  · Out of bed to chair/ambulate  · Consult stoma/wound care RN for stoma eval/education Monday. · DVT ppx lovenox 30mg bid        ---------------------------------------------------------------  Itzel Odom DO, PGY-5  [address], [place].  2/27/2021; 8:25 AM  I Dr. Carlton Champion saw and examined the patient. I have edited the above and agree with the above. Nehal Ricks  Colorectal Surgery

## 2021-02-27 NOTE — PROGRESS NOTES
Subjective:     Follow-up CHF  Less shortness of breath no tachypnea no PND no orthopnea still with mild pedal edema    ROS  No fever no chills no GI/ complaints except for heartburn no dysphagia, no cardiac complaints no TIA no bleeding no headache no sore throat no skin lesions, no polyuria no polydipsia  physical exam  General Appearance: alert and oriented to person, place and time and in no acute distress  Skin: warm and dry, no rash or erythema  Head: normocephalic and atraumatic  Eyes: pupils equal, round, and reactive to light, conjunctivae normal and sclera anicteric  Neck: neck supple and non tender without mass   Pulmonary/Chest: clear to auscultation bilaterally- no wheezes, rales or rhonchi, normal air movement, no respiratory distress  Cardiovascular: normal rate, regular rhythm, normal S1 and S2, no gallops, intact distal pulses and no carotid bruits  Abdomen: soft, non-tender, non-distended, normal bowel sounds, no masses or organomegaly  Extremities: Trace edema good pulses negative Homans  Neurologic: alert  Oriented x 3 no focal deficit    BP (!) 104/49   Pulse 65   Temp 97.4 °F (36.3 °C) (Temporal)   Resp 19   Ht 5' 2\" (1.575 m)   Wt (!) 306 lb (138.8 kg)   SpO2 96%   BMI 55.97 kg/m²     CBC:   Lab Results   Component Value Date    WBC 9.6 02/27/2021    RBC 2.95 02/27/2021    HGB 8.9 02/27/2021    HCT 29.4 02/27/2021    MCV 99.7 02/27/2021    MCH 30.2 02/27/2021    MCHC 30.3 02/27/2021    RDW 14.2 02/27/2021     02/27/2021    MPV 9.6 02/27/2021     BMP:    Lab Results   Component Value Date     02/27/2021    K 4.0 02/27/2021     02/27/2021    CO2 28 02/27/2021    BUN 19 02/27/2021    CREATININE 1.13 02/27/2021    CALCIUM 8.3 02/27/2021    GFRAA 57 02/27/2021    LABGLOM 47 02/27/2021    GLUCOSE 123 02/27/2021        Assessment:  Patient Active Problem List   Diagnosis    Acute respiratory failure following trauma and surgery (HCC)    Adiposity    Arthralgia of

## 2021-02-27 NOTE — PROGRESS NOTES
Shanique Oconnor, PPatient Assessment complete. S/P colectomy [Z90.49] . Vitals:    02/27/21 1200   BP: (!) 104/49   Pulse: 65   Resp: 19   Temp: 97.4 °F (36.3 °C)   SpO2: 96%   . Patients home meds are   Prior to Admission medications    Medication Sig Start Date End Date Taking?  Authorizing Provider   amiodarone (PACERONE) 100 MG tablet Take 100 mg by mouth three times a week Monday, Wednesday, friday   Yes Historical Provider, MD   ascorbic acid (VITAMIN C) 500 MG tablet Take 500 mg by mouth daily   Yes Historical Provider, MD   magnesium gluconate (MAGONATE) 500 MG tablet Take 500 mg by mouth 2 times daily   Yes Historical Provider, MD   torsemide (DEMADEX) 20 MG tablet Take 20 mg by mouth daily   Yes Historical Provider, MD   acetaminophen (TYLENOL) 650 MG extended release tablet Take 650 mg by mouth   Yes Historical Provider, MD   Calcium Ascorbate 500 MG TABS Take 600 mg by mouth    Yes Historical Provider, MD   atorvastatin (LIPITOR) 20 MG tablet Take 20 mg by mouth 5/30/18  Yes Historical Provider, MD   buPROPion (WELLBUTRIN SR) 150 MG extended release tablet TAKE 1 TABLET BY MOUTH TWO  TIMES DAILY 7/30/18  Yes Historical Provider, MD   Calcium Carbonate-Vitamin D 600-125 MG-UNIT TABS Take 600 mg by mouth   Yes Historical Provider, MD   cetirizine (ZYRTEC) 10 MG tablet Take 10 mg by mouth 11/17/17  Yes Historical Provider, MD   Cholecalciferol 2000 units CAPS Take 2,000 Units by mouth   Yes Historical Provider, MD   citalopram (CELEXA) 20 MG tablet 10 mg  7/16/18  Yes Historical Provider, MD   fluticasone (FLONASE) 50 MCG/ACT nasal spray 1 spray by Nasal route 7/12/17  Yes Historical Provider, MD   fluticasone-salmeterol (ADVAIR) 500-50 MCG/DOSE diskus inhaler Inhale 1 puff into the lungs   Yes Historical Provider, MD   LUTEIN PO Take 20 mg by mouth   Yes Historical Provider, MD   Melatonin 2.5 MG CHEW Take 3 mg by mouth   Yes Historical Provider, MD   metoprolol tartrate (LOPRESSOR) 25 MG tablet Take 25 mg by mouth   Yes Historical Provider, MD   Multiple Vitamins-Minerals (MULTIVITAMIN ADULT PO) Take 1 tablet by mouth   Yes Historical Provider, MD   pantoprazole (PROTONIX) 40 MG tablet TAKE 1 TABLET BY MOUTH  DAILY 10/5/18  Yes Historical Provider, MD   polyethylene glycol (MIRALAX) powder Take 17 g by mouth   Yes Historical Provider, MD   SENNOSIDES-DOCUSATE SODIUM PO Take 2 tablets by mouth   Yes Historical Provider, MD   spironolactone (ALDACTONE) 25 MG tablet Take 25 mg by mouth 7/3/18  Yes Historical Provider, MD   albuterol sulfate  (90 Base) MCG/ACT inhaler Inhale 2 puffs into the lungs    Historical Provider, MD   aspirin 325 MG tablet Take 325 mg by mouth    Historical Provider, MD   Azelastine-Fluticasone 137-50 MCG/ACT SUSP 1 spray by Nasal route 4/16/18   Historical Provider, MD   ipratropium-albuterol (DUONEB) 0.5-2.5 (3) MG/3ML SOLN nebulizer solution Inhale 3 mLs into the lungs    Historical Provider, MD   nystatin ([address]) 807007 UNIT/GM powder APPLY TO AFFECTED AREA(S) THREE TIMES A DAY AS NEEDED UNDER BREASTS 5/21/18   Historical Provider, MD   Omega-3 Fatty Acids (FISH OIL PO) Take 2 g by mouth    Historical Provider, MD   solifenacin (VESICARE) 5 MG tablet TAKE 1 TABLET BY MOUTH ONCE DAILY 8/24/18   Historical Provider, MD   .  Recent Surgical History: Lower Abdominal = 2     Assessment     FEV1/FVC    FEV1 Predicted       FEV1     FEV1 % Predicted   FVC   IS volume   IBW   FIO2% 2lpm  SPO2 100%  RR 19  Breath Sounds: dim/clear      · Bronchodilator assessment at level  2  · Hyperinflation assessment at level   · Secretion Management assessment at level    ·   · []    Bronchodilator Assessment  BRONCHODILATOR ASSESSMENT SCORE  Score 0 1 2 3 4 5   Breath Sounds   []  Patient Baseline []  No Wheeze good aeration [x]  Faint, scattered wheezing, good aeration []  Expiratory Wheezing and or moderately diminished []  Insp/Exp wheeze and/or very diminished []  Insp/Exp and/ or marked distress   Respiratory Rate   []  Patient Baseline [x]  Less than 20 [x]  Less than 20 []  20-25 []  Greater than 25 []  Greater than 25   Peak flow % of Pred or PB [x]  NA   []  Greater than 90%  []  81-90% []  71-80% []  Less than or equal to 70%  or unable to perform []  Unable due to Respiratory Distress   Dyspnea re []  Patient Baseline []  No SOB []  No SOB [x]  SOB on exertion []  SOB min activity []  At rest/acute   e FEV% Predicted       [x]  NA []  Above 69%  []  Unable []  Above 60-69%  []  Unable []  Above 50-59%  []  Unable []  Above 35-49%  []  Unable []  Less than 35%  []  Unable                 []  Hyperinflation Assessment  Score 1 2 3   CXR and Breath Sounds   []  Clear []  No atelectasis  Basilar aeration []  Atelectasis or absent basilar breath sounds   Incentive Spirometry Volume  (Per IBW)   []  Greater than or equal to 15ml/Kg []  less than 15ml/Kg []  less than 15ml/Kg   Surgery within last 2 weeks []  None or general   []  Abdominal or thoracic surgery  []  Abdominal or thoracic   Chronic Pulmonary Historyre []  No []  Yes []  Yes     []  Secretion Management Assessment  Score 1 2 3   Bilateral Breath Sounds   []  Occasional Rhonchi []  Scattered Rhonchi []  Course Rhonchi and/or poor aeration   Sputum    []  Small amount of thin secretions []  Moderate amount of viscous secretions []  Copius, Viscious Yellow/ Secretions   CXR as reported by physician []  clear  []  Unavailable []  Infiltrates and/or consolidation  []  Unavailable []  Mucus Plugging and or lobar consolidation  []  Unavailable   Cough []  Strong, productive cough []  Weak productive cough []  No cough or weak non-productive cough

## 2021-02-27 NOTE — PROGRESS NOTES
Pulmonary Critical Care Progress Note    Patient seen for the follow up of chronic respiratory failure/asthma    Subjective:    She denies chest pain. Mild occasional cough, mostly dry. Shortness of breath not much changed. She is on oxygen at 2 L nasal cannula home dose. She has been following with Dr. Toño Cerna. She is not ambulating. Pain is controlled. She tolerates clear liquids. Examination:    Vitals: BP (!) 104/49   Pulse 65   Temp 97.4 °F (36.3 °C) (Temporal)   Resp 19   Ht 5' 2\" (1.575 m)   Wt (!) 306 lb (138.8 kg)   SpO2 96%   BMI 55.97 kg/m²   SpO2  Av.7 %  Min: 93 %  Max: 100 %  General appearance: alert and cooperative with exam  Neck: No JVD  Lungs: Decreased breath sound no crackles or wheezing  Heart: regular rate and rhythm, S1, S2 normal, no gallop  Abdomen: Soft, mildly tender, + BS ostomy clean dressing  Extremities: no cyanosis or clubbing.   1+ edema    LABs:    CBC:   Recent Labs     21  0458 21  0513   WBC 10.7 9.6   HGB 10.0* 8.9*   HCT 33.4* 29.4*    159     BMP:   Recent Labs     21  0458 21  0513    138   K 4.4 4.0   CO2 25 28   BUN 21 19   CREATININE 1.18* 1.13*   LABGLOM 45* 47*   GLUCOSE 138* 123*       Radiology:    Chest x-ray   Small bilateral effusions with scattered pulmonary infiltrates concerning for   edema versus pneumonia       Impression:  · Acute on chronic hypoxic respiratory insufficiency, on nocturnal oxygen  · Mild pulmonary edema  · History of asthma  · Obstructive sleep apnea/morbid obesity, on CPAP therapy  · Status post exploratory laparotomy, sigmoid/superior rectum resection and creation of end colostomy and lysis of adhesions with mobilization of splenic flexure  · SHARYN  · CAD, CHF, HLD, HTN MVR    Recommendations:  · Oxygen via nasal cannula, keep SPO2 90% or greater  · Incentive spirometry every hour while awake  · Albuterol and Ipratropium Q 4 hours and prn  ·  Symbicort 160 twice daily  · Clear liquids

## 2021-02-27 NOTE — PROGRESS NOTES
Occupational Therapy   Occupational Therapy Initial Assessment  Date: 2021   Patient Name: Josh Garcia  MRN: [id number]     : 1945    Date of Service: 2021    Discharge Recommendations:  Home with assist PRN, Home with Home health OT  OT Equipment Recommendations  Equipment Needed: Yes  Mobility Devices: ADL Assistive Devices  ADL Assistive Devices: Sock-Aid Soft;Reacher;Long-handled Sponge     Pt would benefit from skilled home OT services in order to maximize occupational performance, safety, and independence in the home environment. RN reports patient is medically stable for therapy treatment this date. Chart reviewed prior to treatment and patient is agreeable for therapy. All lines intact and patient positioned comfortably at end of treatment. All patient needs addressed prior to ending therapy session. Assessment   Performance deficits / Impairments: Decreased functional mobility ; Decreased ADL status; Decreased safe awareness;Decreased endurance;Decreased balance;Decreased strength;Decreased sensation;Decreased high-level IADLs  Assessment: Skilled OT is indicated to increase overall IND and safety with self-care and functional tasks to return home with assistance as needed  Prognosis: Good  Decision Making: Medium Complexity  OT Education: OT Role;Plan of Care;Home Exercise Program;ADL Adaptive Strategies;Transfer Training;Precautions; Energy Conservation;IADL Safety;Equipment; Family Education  Patient Education: OT POC, benefits of continued therapy, call light use/fall risk safety  REQUIRES OT FOLLOW UP: Yes  Activity Tolerance  Activity Tolerance: Patient Tolerated treatment well  Safety Devices  Safety Devices in place: Yes  Type of devices: All fall risk precautions in place;Gait belt;Nurse notified; Left in bed;Call light within reach; Bed alarm in place; Patient at risk for falls           Patient Diagnosis(es): There were no encounter diagnoses.      has a past medical history of Acute respiratory failure following trauma and surgery ([facility] [address] Adiposity, Arthralgia of multiple joints, Arthritis, Asthma, Asthma with acute exacerbation, Atopic rhinitis, CAD (coronary artery disease), CHF (congestive heart failure) (HCC), Chronic bilateral low back pain without sciatica, Closed fracture of proximal end of humerus, COPD (chronic obstructive pulmonary disease) ([address] Depression, Diverticulitis, Diverticulosis of large intestine, Diverticulosis of large intestine without hemorrhage, Encounter for mammogram to establish baseline mammogram, Essential hypertension, H/O hysterectomy for benign disease, Heart murmur, History of blood transfusion, Hyperlipidemia, Hypothermia due to anesthesia, Laceration of chest wall, Mitral incompetence, Morbid obesity ([facility] [address] Non-rheumatic mitral regurgitation, Polyp of sigmoid colon, Postmenopausal status, Routine general medical examination at a health care facility, Special screening for malignant neoplasms, colon, Special screening for malignant neoplasms, vagina, and Urinary incontinence. has a past surgical history that includes Appendectomy; Mitral valve repair; Hysterectomy; bronchoscopy; Cardiac catheterization (03/22/2017); Cardiac catheterization (11/18*/2016); Wound Exploration; other surgical history; Ovary removal; eye surgery (Bilateral); Colonoscopy; Endoscopy, colon, diagnostic; fracture surgery; and Small intestine surgery (N/A, 2/25/2021). Per H&P: This is Bakari Diver a 67 y.o. female who presents today for a Colonoscopy with abdominal exploration, bowel resection low anterior repair colovesical fistula and possible stoma by Dr. Gregorio Linares for colovesical fistula. H/o frequent recurrent UTI's for more than 6 months Referred to Dr David Dewitt who performed a cystoscopy and discovered a colovesical fistula and was referred to Dr Leslie Shannon. Hx diverticulitis year ago. The patient denies bowel changes, rectal bleeding or unusual discharge.  She was seen by Dr Carlton Champion 1/13/21 who recommended surgical intervention and the patient arrived for her procedure.  Patient followed the bowel prep as directed until clear  Denies fever, chills, dysuria, hematuria,or abdominal pain     Restrictions  Restrictions/Precautions  Restrictions/Precautions: General Precautions, Surgical Protocols, Fall Risk, Up as Tolerated  Required Braces or Orthoses?: No  Position Activity Restriction  Other position/activity restrictions: 2L O2, LUE IV, incision on stomach region with dressing, colostomy, garrido cath, ambulate pt    Subjective   General  Chart Reviewed: Yes  Patient assessed for rehabilitation services?: Yes  Family / Caregiver Present: No  Pain Assessment  Pain Level: 4  Vital Signs  Temp: 97.3 °F (36.3 °C)  Temp Source: Temporal  Heart Rate Source: Monitor  BP Location: Right upper arm  Patient Position: Semi fowlers  Social/Functional History  Social/Functional History  Lives With: Spouse(Pt husbands isn't able to assist much)  Type of Home: House  Home Layout: One level, Laundry in basement(hired help for laundry)  Home Access: Ramped entrance  Bathroom Shower/Tub: Tub/Shower unit  Bathroom Toilet: Handicap height  Bathroom Equipment: Grab bars in shower, Shower chair, Grab bars around toilet  Home Equipment: Rolling walker, Cane, Sock aid, Oxygen(pt will use cane when grocery shopping)  Receives Help From: Personal care attendant(aides and hired cleaning lady)  ADL Assistance: Independent(24 hr aides for )  Homemaking Assistance: Needs assistance(ladies for cleaning, pt will cook)  Ambulation Assistance: Independent  Transfer Assistance: Independent  Active : Yes  Mode of Transportation: Car  Occupation: Retired  Type of occupation: Teach special Ed  Leisure & Hobbies: sitting with  and watching movies  Additional Comments: Pt states she rolled out of bed, pt required assist to get up       Objective   Vision: Impaired  Vision Exceptions: Wears glasses at all times  Hearing: Within functional limits(processing speed slightly slower per pt)    Orientation  Overall Orientation Status: Within Functional Limits  Observation/Palpation  Posture: Good  Observation: LUE IV, garrido, dressing over incision  Edema: BLE edema  Balance  Sitting Balance: Supervision  Standing Balance: Contact guard assistance(CGA to SBA)  Standing Balance  Time: 10+ min  Activity: functional mob in hallway, static standing at sink  Comment: Assistance/awareness with lines to increase safety  Functional Mobility  Functional - Mobility Device: Rolling Walker  Activity: To/from bathroom(in hallway)  Assist Level: Minimal assistance(x2)  Functional Mobility Comments: w/c follow, VCs for RW safety/mgmt, and awareness/assist with lines to increase safety  ADL  Feeding: Modified independent   Grooming: Stand by assistance(Pt stood at sink and performed oral care with SBA and assistance with line mgmt for safety)  UE Bathing: Stand by assistance  LE Bathing: Maximum assistance  UE Dressing: Stand by assistance  LE Dressing: Maximum assistance(DEP to doff/herminia B socks)  Toileting: (Garrido and colestomy)  Additional Comments: Pt with increased pain in abdomen d/t sx and has difficulty bending forward at this time. Tone RUE  RUE Tone: Normotonic  Tone LUE  LUE Tone: Normotonic  Coordination  Movements Are Fluid And Coordinated: Yes     Bed mobility  Sit to Supine: Minimal assistance  Scooting: Stand by assistance  Comment: HOB raised VCs for proper bed mob tech  Transfers  Sit to stand: Contact guard assistance  Stand to sit: Contact guard assistance  Transfer Comments: VCs for proper hand placement on stable surface for all transfers     Cognition  Overall Cognitive Status: Exceptions  Arousal/Alertness: Appropriate responses to stimuli  Following Commands:  Follows all commands without difficulty  Attention Span: Appears intact  Memory: Appears intact  Safety Judgement: Decreased awareness of need for safety Initiation: Does not require cues  Sequencing: Requires cues for some  Perception  Overall Perceptual Status: WFL     Sensation  Overall Sensation Status: WFL        LUE AROM (degrees)  LUE AROM : WFL  Left Hand AROM (degrees)  Left Hand AROM: WFL  RUE AROM (degrees)  RUE AROM : WFL  Right Hand AROM (degrees)  Right Hand AROM: WFL  LUE Strength  LUE Strength Comment: Not formally tested d/t abdominal sx - grossly observed at 4-/5  RUE Strength  RUE Strength Comment: Not formally tested d/t abdominal sx - grossly observed at [address] [facility]  Times per week: 4-5x per week, 1-2x per day as cayetano  Times per day: Daily  Current Treatment Recommendations: Strengthening, Balance Training, Safety Education & Training, Self-Care / ADL, Equipment Evaluation, Education, & procurement, Endurance Training, Patient/Caregiver Education & Training, Home Management Training, Functional Mobility Training, Pain Management      Goals  Short term goals  Time Frame for Short term goals: by discharge, pt to demo  Short term goal 1: S/MI for bed mob tasks with use of bed rail as needed  Short term goal 2: S/MI for UB ADLs and SBA for LB ADLs with AE as needed and Good safety  Short term goal 3: S/MI for ADL transfers and functional mob with least restrictive mob device and Good safety  Short term goal 4: Pt/family to be IND with EC/WS, precautions, and fall prevention tech as well as DME/AE recommendations with use of handouts  Patient Goals   Patient goals : To go home       Therapy Time   Individual Concurrent Group Co-treatment   Time In 1508         Time Out 46         Minutes 40               Educated patient on not leaning on walker during transfers and safe place for hand placement during transfers. Also keeping walker close to patient    Upon writer exit, call light within reach, pt retired to bed. All lines intact and patient positioned comfortably. All patient needs addressed prior to ending therapy session. Chart reviewed prior to treatment and patient is agreeable for therapy. RN reports patient is medically stable for therapy treatment this date.     Shannon Caldwell OTR/L

## 2021-02-28 ENCOUNTER — APPOINTMENT (OUTPATIENT)
Dept: GENERAL RADIOLOGY | Age: 76
DRG: 981 | End: 2021-02-28
Attending: COLON & RECTAL SURGERY
Payer: MEDICARE

## 2021-02-28 LAB
ABSOLUTE EOS #: 0.22 K/UL (ref 0–0.44)
ABSOLUTE IMMATURE GRANULOCYTE: 0.06 K/UL (ref 0–0.3)
ABSOLUTE LYMPH #: 1.02 K/UL (ref 1.1–3.7)
ABSOLUTE MONO #: 0.74 K/UL (ref 0.1–1.2)
ANION GAP SERPL CALCULATED.3IONS-SCNC: 8 MMOL/L (ref 9–17)
BASOPHILS # BLD: 1 % (ref 0–2)
BASOPHILS ABSOLUTE: 0.05 K/UL (ref 0–0.2)
BUN BLDV-MCNC: 17 MG/DL (ref 8–23)
BUN/CREAT BLD: 13 (ref 9–20)
CALCIUM SERPL-MCNC: 8.9 MG/DL (ref 8.6–10.4)
CHLORIDE BLD-SCNC: 99 MMOL/L (ref 98–107)
CO2: 29 MMOL/L (ref 20–31)
CREAT SERPL-MCNC: 1.32 MG/DL (ref 0.5–0.9)
DIFFERENTIAL TYPE: ABNORMAL
EOSINOPHILS RELATIVE PERCENT: 2 % (ref 1–4)
GFR AFRICAN AMERICAN: 47 ML/MIN
GFR NON-AFRICAN AMERICAN: 39 ML/MIN
GFR SERPL CREATININE-BSD FRML MDRD: ABNORMAL ML/MIN/{1.73_M2}
GFR SERPL CREATININE-BSD FRML MDRD: ABNORMAL ML/MIN/{1.73_M2}
GLUCOSE BLD-MCNC: 100 MG/DL (ref 70–99)
HCT VFR BLD CALC: 30.9 % (ref 36.3–47.1)
HEMOGLOBIN: 9.4 G/DL (ref 11.9–15.1)
IMMATURE GRANULOCYTES: 1 %
LYMPHOCYTES # BLD: 11 % (ref 24–43)
MCH RBC QN AUTO: 30.2 PG (ref 25.2–33.5)
MCHC RBC AUTO-ENTMCNC: 30.4 G/DL (ref 28.4–34.8)
MCV RBC AUTO: 99.4 FL (ref 82.6–102.9)
MONOCYTES # BLD: 8 % (ref 3–12)
NRBC AUTOMATED: 0 PER 100 WBC
PDW BLD-RTO: 14.3 % (ref 11.8–14.4)
PLATELET # BLD: 181 K/UL (ref 138–453)
PLATELET ESTIMATE: ABNORMAL
PMV BLD AUTO: 9.6 FL (ref 8.1–13.5)
POTASSIUM SERPL-SCNC: 3.9 MMOL/L (ref 3.7–5.3)
RBC # BLD: 3.11 M/UL (ref 3.95–5.11)
RBC # BLD: ABNORMAL 10*6/UL
SEG NEUTROPHILS: 77 % (ref 36–65)
SEGMENTED NEUTROPHILS ABSOLUTE COUNT: 7.28 K/UL (ref 1.5–8.1)
SODIUM BLD-SCNC: 136 MMOL/L (ref 135–144)
WBC # BLD: 9.4 K/UL (ref 3.5–11.3)
WBC # BLD: ABNORMAL 10*3/UL

## 2021-02-28 PROCEDURE — 6370000000 HC RX 637 (ALT 250 FOR IP): Performed by: STUDENT IN AN ORGANIZED HEALTH CARE EDUCATION/TRAINING PROGRAM

## 2021-02-28 PROCEDURE — 94761 N-INVAS EAR/PLS OXIMETRY MLT: CPT

## 2021-02-28 PROCEDURE — 80048 BASIC METABOLIC PNL TOTAL CA: CPT

## 2021-02-28 PROCEDURE — 71045 X-RAY EXAM CHEST 1 VIEW: CPT

## 2021-02-28 PROCEDURE — 2700000000 HC OXYGEN THERAPY PER DAY

## 2021-02-28 PROCEDURE — C9113 INJ PANTOPRAZOLE SODIUM, VIA: HCPCS | Performed by: INTERNAL MEDICINE

## 2021-02-28 PROCEDURE — 94640 AIRWAY INHALATION TREATMENT: CPT

## 2021-02-28 PROCEDURE — 2580000003 HC RX 258: Performed by: STUDENT IN AN ORGANIZED HEALTH CARE EDUCATION/TRAINING PROGRAM

## 2021-02-28 PROCEDURE — 6360000002 HC RX W HCPCS: Performed by: INTERNAL MEDICINE

## 2021-02-28 PROCEDURE — 6360000002 HC RX W HCPCS: Performed by: STUDENT IN AN ORGANIZED HEALTH CARE EDUCATION/TRAINING PROGRAM

## 2021-02-28 PROCEDURE — 85025 COMPLETE CBC W/AUTO DIFF WBC: CPT

## 2021-02-28 PROCEDURE — 36415 COLL VENOUS BLD VENIPUNCTURE: CPT

## 2021-02-28 PROCEDURE — 2060000000 HC ICU INTERMEDIATE R&B

## 2021-02-28 PROCEDURE — 74018 RADEX ABDOMEN 1 VIEW: CPT

## 2021-02-28 PROCEDURE — 6370000000 HC RX 637 (ALT 250 FOR IP): Performed by: INTERNAL MEDICINE

## 2021-02-28 RX ORDER — GUAIFENESIN 600 MG/1
600 TABLET, EXTENDED RELEASE ORAL 2 TIMES DAILY
Status: DISCONTINUED | OUTPATIENT
Start: 2021-02-28 | End: 2021-03-12 | Stop reason: HOSPADM

## 2021-02-28 RX ORDER — SUCRALFATE 1 G/1
1 TABLET ORAL
Status: DISCONTINUED | OUTPATIENT
Start: 2021-02-28 | End: 2021-03-12 | Stop reason: HOSPADM

## 2021-02-28 RX ORDER — HEPARIN SODIUM 5000 [USP'U]/ML
5000 INJECTION, SOLUTION INTRAVENOUS; SUBCUTANEOUS EVERY 8 HOURS SCHEDULED
Status: DISCONTINUED | OUTPATIENT
Start: 2021-02-28 | End: 2021-03-12 | Stop reason: HOSPADM

## 2021-02-28 RX ORDER — PANTOPRAZOLE SODIUM 40 MG/10ML
40 INJECTION, POWDER, LYOPHILIZED, FOR SOLUTION INTRAVENOUS DAILY
Status: DISCONTINUED | OUTPATIENT
Start: 2021-02-28 | End: 2021-03-12 | Stop reason: HOSPADM

## 2021-02-28 RX ADMIN — OXYCODONE HYDROCHLORIDE 5 MG: 5 TABLET ORAL at 06:11

## 2021-02-28 RX ADMIN — PANTOPRAZOLE SODIUM 40 MG: 40 TABLET, DELAYED RELEASE ORAL at 05:55

## 2021-02-28 RX ADMIN — IPRATROPIUM BROMIDE AND ALBUTEROL SULFATE 1 AMPULE: .5; 3 SOLUTION RESPIRATORY (INHALATION) at 19:53

## 2021-02-28 RX ADMIN — PANTOPRAZOLE SODIUM 40 MG: 40 INJECTION, POWDER, FOR SOLUTION INTRAVENOUS at 12:12

## 2021-02-28 RX ADMIN — IPRATROPIUM BROMIDE AND ALBUTEROL SULFATE 1 AMPULE: .5; 3 SOLUTION RESPIRATORY (INHALATION) at 07:57

## 2021-02-28 RX ADMIN — SODIUM CHLORIDE, PRESERVATIVE FREE 10 ML: 5 INJECTION INTRAVENOUS at 09:00

## 2021-02-28 RX ADMIN — ANTACID TABLETS 500 MG: 500 TABLET, CHEWABLE ORAL at 09:48

## 2021-02-28 RX ADMIN — HEPARIN SODIUM 5000 UNITS: 5000 INJECTION INTRAVENOUS; SUBCUTANEOUS at 12:13

## 2021-02-28 RX ADMIN — GUAIFENESIN 600 MG: 600 TABLET, EXTENDED RELEASE ORAL at 20:25

## 2021-02-28 RX ADMIN — ATORVASTATIN CALCIUM 20 MG: 20 TABLET, FILM COATED ORAL at 12:04

## 2021-02-28 RX ADMIN — BUDESONIDE AND FORMOTEROL FUMARATE DIHYDRATE 2 PUFF: 160; 4.5 AEROSOL RESPIRATORY (INHALATION) at 19:53

## 2021-02-28 RX ADMIN — ACETAMINOPHEN 1000 MG: 500 TABLET, COATED ORAL at 23:10

## 2021-02-28 RX ADMIN — SPIRONOLACTONE 25 MG: 25 TABLET ORAL at 12:03

## 2021-02-28 RX ADMIN — HEPARIN SODIUM 5000 UNITS: 5000 INJECTION INTRAVENOUS; SUBCUTANEOUS at 23:09

## 2021-02-28 RX ADMIN — METOPROLOL TARTRATE 25 MG: 25 TABLET, FILM COATED ORAL at 20:25

## 2021-02-28 RX ADMIN — GUAIFENESIN 600 MG: 600 TABLET, EXTENDED RELEASE ORAL at 12:14

## 2021-02-28 RX ADMIN — IBUPROFEN 400 MG: 200 TABLET, FILM COATED ORAL at 18:43

## 2021-02-28 RX ADMIN — BUPROPION HYDROCHLORIDE 150 MG: 150 TABLET, EXTENDED RELEASE ORAL at 12:03

## 2021-02-28 RX ADMIN — IBUPROFEN 400 MG: 200 TABLET, FILM COATED ORAL at 05:55

## 2021-02-28 RX ADMIN — METOPROLOL TARTRATE 25 MG: 25 TABLET, FILM COATED ORAL at 12:04

## 2021-02-28 RX ADMIN — ONDANSETRON HYDROCHLORIDE 4 MG: 2 SOLUTION INTRAMUSCULAR; INTRAVENOUS at 09:00

## 2021-02-28 RX ADMIN — IPRATROPIUM BROMIDE AND ALBUTEROL SULFATE 1 AMPULE: .5; 3 SOLUTION RESPIRATORY (INHALATION) at 15:11

## 2021-02-28 RX ADMIN — ONDANSETRON HYDROCHLORIDE 4 MG: 2 SOLUTION INTRAMUSCULAR; INTRAVENOUS at 13:46

## 2021-02-28 RX ADMIN — SUCRALFATE 1 G: 1 TABLET ORAL at 12:02

## 2021-02-28 RX ADMIN — ACETAMINOPHEN 1000 MG: 500 TABLET, COATED ORAL at 12:15

## 2021-02-28 RX ADMIN — FLUTICASONE PROPIONATE 1 SPRAY: 50 SPRAY, METERED NASAL at 12:08

## 2021-02-28 RX ADMIN — CITALOPRAM HYDROBROMIDE 10 MG: 10 TABLET ORAL at 12:03

## 2021-02-28 RX ADMIN — TORSEMIDE 20 MG: 20 TABLET ORAL at 12:03

## 2021-02-28 RX ADMIN — IBUPROFEN 400 MG: 200 TABLET, FILM COATED ORAL at 12:02

## 2021-02-28 RX ADMIN — BUDESONIDE AND FORMOTEROL FUMARATE DIHYDRATE 2 PUFF: 160; 4.5 AEROSOL RESPIRATORY (INHALATION) at 07:59

## 2021-02-28 ASSESSMENT — PAIN SCALES - GENERAL
PAINLEVEL_OUTOF10: 6
PAINLEVEL_OUTOF10: 6
PAINLEVEL_OUTOF10: 5
PAINLEVEL_OUTOF10: 4
PAINLEVEL_OUTOF10: 6
PAINLEVEL_OUTOF10: 3
PAINLEVEL_OUTOF10: 6
PAINLEVEL_OUTOF10: 4
PAINLEVEL_OUTOF10: 0

## 2021-02-28 ASSESSMENT — PAIN DESCRIPTION - ONSET: ONSET: ON-GOING

## 2021-02-28 ASSESSMENT — PAIN DESCRIPTION - DESCRIPTORS: DESCRIPTORS: ACHING;SORE

## 2021-02-28 ASSESSMENT — PAIN DESCRIPTION - PAIN TYPE
TYPE: SURGICAL PAIN

## 2021-02-28 ASSESSMENT — PAIN DESCRIPTION - LOCATION
LOCATION: ABDOMEN

## 2021-02-28 ASSESSMENT — PAIN DESCRIPTION - PROGRESSION
CLINICAL_PROGRESSION: NOT CHANGED

## 2021-02-28 ASSESSMENT — PAIN DESCRIPTION - ORIENTATION: ORIENTATION: MID

## 2021-02-28 ASSESSMENT — PAIN DESCRIPTION - FREQUENCY: FREQUENCY: CONTINUOUS

## 2021-02-28 NOTE — PLAN OF CARE
Problem: Pain:  Goal: Pain level will decrease  Description: Pain level will decrease  Outcome: Ongoing  Note: Pt states pain level decreased . Will continue to monitor  Goal: Control of acute pain  Description: Control of acute pain  Outcome: Ongoing  Goal: Control of chronic pain  Description: Control of chronic pain  Outcome: Ongoing     Problem: Falls - Risk of:  Goal: Will remain free from falls  Description: Will remain free from falls  Outcome: Ongoing  Note: Patients bed is in the lowest position with the brakes locked. Side rails up 2/4 . Call light ,bedside table, and personnel belongings with in reach. Non slip socks in place. Patient checks done every hour and prn per unit protocol.     Goal: Absence of physical injury  Description: Absence of physical injury  Outcome: Ongoing  Note: Pt resting in bed will continue to monitor

## 2021-02-28 NOTE — FLOWSHEET NOTE
02/28/21 1420   Provider Notification   Reason for Communication Review case   Provider Name Dr Santa Temple   Provider Notification Physician   Method of Communication Call   Response See orders   Notification Time [phone]   Dr Santa Temple requested call re: JOSE wright. RN updated MD - New order for pt to remain NPO. NG to be placed if there are any future occurrences of emesis.

## 2021-02-28 NOTE — PROGRESS NOTES
Subjective:     Follow-up CHF  No shortness of breath no PND no orthopnea no wheezing still complaining of some dry coughROS  No fever no chills no  or cardiopulmonary complaints except cough, had nausea heartburn and one episode of vomiting vague abdominal pain, no TIA no bleeding no headache no sore throat no skin lesions  physical exam  General Appearance: alert and oriented to person, place and time and in no acute distress  Skin: warm and dry, no rash or erythema  Head: normocephalic and atraumatic  Eyes: pupils equal, round, and reactive to light, conjunctivae normal and sclera anicteric  Neck: neck supple and non tender without mass   Pulmonary/Chest: clear to auscultation bilaterally- no wheezes, rales or rhonchi, normal air movement, no respiratory distress  Cardiovascular: normal rate, regular rhythm, normal S1 and S2, no gallops, intact distal pulses and no carotid bruits  Abdomen: soft, non-tender and decreased bowel sounds colostomy  Extremities: no edema and pulses no Homans' sign  Neurologic: Alert oriented x3 with no focal deficit    BP (!) 143/62   Pulse 75   Temp 98.5 °F (36.9 °C) (Oral)   Resp 18   Ht 5' 2\" (1.575 m)   Wt (!) 309 lb (140.2 kg)   SpO2 97%   BMI 56.52 kg/m²     CBC:   Lab Results   Component Value Date    WBC 9.4 02/28/2021    RBC 3.11 02/28/2021    HGB 9.4 02/28/2021    HCT 30.9 02/28/2021    MCV 99.4 02/28/2021    MCH 30.2 02/28/2021    MCHC 30.4 02/28/2021    RDW 14.3 02/28/2021     02/28/2021    MPV 9.6 02/28/2021     BMP:    Lab Results   Component Value Date     02/28/2021    K 3.9 02/28/2021    CL 99 02/28/2021    CO2 29 02/28/2021    BUN 17 02/28/2021    CREATININE 1.32 02/28/2021    CALCIUM 8.9 02/28/2021    GFRAA 47 02/28/2021    LABGLOM 39 02/28/2021    GLUCOSE 100 02/28/2021        Assessment:  Patient Active Problem List   Diagnosis    Acute respiratory failure following trauma and surgery (HCC)    Adiposity    Arthralgia of multiple joints    Asthma    Asthma with acute exacerbation    Atopic rhinitis    CHF (congestive heart failure) (HCC)    Chronic bilateral low back pain without sciatica    Closed fracture of proximal end of humerus    Depression    Diverticulitis    Diverticulosis of large intestine    Diverticulosis of large intestine without hemorrhage    Essential hypertension    H/O hysterectomy for benign disease    Heart murmur    Hyperlipidemia    Hypothermia due to anesthesia    Laceration of chest wall    Mitral incompetence    Morbid obesity (HCC)    Non-rheumatic mitral regurgitation    Polyp of sigmoid colon    Postmenopausal status    Urinary incontinence    S/P colectomy     Acute on chronic respiratory failure  Morbid obesity excess calories  JAYDEN on BiPAP  CKD 3  Acute on chronic CHF diastolic  GERD  Possible ileus  Acute blood loss anemia  COPD  Plan:    Meds labs reviewed check KUB continue with diuresis avoid nephrotoxic drugs DVT prophylaxis physical therapy occupational therapy see orders continue with bronchodilators, onProtonix      Kathryn Reyes MD  12:23 PM

## 2021-02-28 NOTE — PROGRESS NOTES
Colorectal Surgery Progress Note    Date: 2/28/2021; 10:19 AM    Cc: post op pain    Subjective:     Patient doing well this morning. Reports pain is controlled. Denies N/V. No fevers. Hemodynamics stable. Actively passing flatus on examination. UOP adequate last 24 hours. Ornelas remains in place. Review of Systems - General ROS: negative for - chills or fever  Cardiovascular ROS: no chest pain or dyspnea on exertion  Gastrointestinal ROS: no nausea or vomiting  Dermatological ROS: negative for - pruritus or rash    Objective:    Vitals:    02/28/21 0800   BP: (!) 112/52   Pulse: 68   Resp: 18   Temp: 98.8 °F (37.1 °C)   SpO2: 97%        Gen: alert, awake, NAD  HEENT: moist mucous membranes, no scleral icterus  CV: RRR  Lung: No respiratory distress, no audible wheezing  Abdomen: soft, appropriately TTP, incision C/D/I with serosanguinous drainage in inferior aspect of dressing; staples intact, ostomy pink and patent, active flatus this AM  Extremities: no cyanosis or clubbing    I/O last 3 completed shifts: In: 1200 [P.O.:1200]  Out: 1130.3 [Urine:1100; Stool:30.3]    CBC:   Lab Results   Component Value Date    WBC 9.4 02/28/2021    RBC 3.11 02/28/2021    HGB 9.4 02/28/2021    HCT 30.9 02/28/2021    MCV 99.4 02/28/2021    MCH 30.2 02/28/2021    MCHC 30.4 02/28/2021    RDW 14.3 02/28/2021     02/28/2021    MPV 9.6 02/28/2021     BMP:    Lab Results   Component Value Date     02/28/2021    K 3.9 02/28/2021    CL 99 02/28/2021    CO2 29 02/28/2021    BUN 17 02/28/2021    CREATININE 1.32 02/28/2021    CALCIUM 8.9 02/28/2021    GFRAA 47 02/28/2021    LABGLOM 39 02/28/2021    GLUCOSE 100 02/28/2021       Assessment/Plan:     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 · Full liquids this AM  · Gentle IVF hydration until adequate PO intake  · Ornelas catheter to stay in place for 10-14 days.  Will

## 2021-02-28 NOTE — PROGRESS NOTES
amiodarone  · Lower extremity venous Dopplers  · Pain control  · Zofran/antiemetics  · Physical therapy  · DVT prophylaxis with low molecular weight heparin        Leonid Paul MD, [facility]  Pulmonary Critical Care and Sleep Medicine,  [facility]  Cell: [phone]  Office: [phone]

## 2021-02-28 NOTE — PROGRESS NOTES
DATE: 2021    NAME: Tamara Ivory  MRN: [id number]   : 1945    Patient not seen this date for Physical Therapy due to:  [] Blood transfusion in progress  [] Cancel by RN  [] Hemodialysis  [x]  Refusal by Patient  (nausea and vomiting)  [] Spine Precautions   [] Strict Bedrest  [] Surgery  [] Testing      [] Other        [] PT being discontinued at this time. Patient independent. No further needs. [] PT being discontinued at this time as the patient has been transferred to hospice care. No further needs.     RAUL NOONAN, PTA

## 2021-03-01 LAB
ABSOLUTE EOS #: 0.28 K/UL (ref 0–0.44)
ABSOLUTE IMMATURE GRANULOCYTE: 0.07 K/UL (ref 0–0.3)
ABSOLUTE LYMPH #: 1.02 K/UL (ref 1.1–3.7)
ABSOLUTE MONO #: 0.78 K/UL (ref 0.1–1.2)
ANION GAP SERPL CALCULATED.3IONS-SCNC: 10 MMOL/L (ref 9–17)
BASOPHILS # BLD: 0 % (ref 0–2)
BASOPHILS ABSOLUTE: 0.03 K/UL (ref 0–0.2)
BUN BLDV-MCNC: 18 MG/DL (ref 8–23)
BUN/CREAT BLD: 13 (ref 9–20)
CALCIUM SERPL-MCNC: 8.5 MG/DL (ref 8.6–10.4)
CHLORIDE BLD-SCNC: 100 MMOL/L (ref 98–107)
CO2: 29 MMOL/L (ref 20–31)
CREAT SERPL-MCNC: 1.36 MG/DL (ref 0.5–0.9)
DIFFERENTIAL TYPE: ABNORMAL
EOSINOPHILS RELATIVE PERCENT: 3 % (ref 1–4)
GFR AFRICAN AMERICAN: 46 ML/MIN
GFR NON-AFRICAN AMERICAN: 38 ML/MIN
GFR SERPL CREATININE-BSD FRML MDRD: ABNORMAL ML/MIN/{1.73_M2}
GFR SERPL CREATININE-BSD FRML MDRD: ABNORMAL ML/MIN/{1.73_M2}
GLUCOSE BLD-MCNC: 96 MG/DL (ref 70–99)
HCT VFR BLD CALC: 30.5 % (ref 36.3–47.1)
HEMOGLOBIN: 9.1 G/DL (ref 11.9–15.1)
IMMATURE GRANULOCYTES: 1 %
LYMPHOCYTES # BLD: 11 % (ref 24–43)
MAGNESIUM: 1.7 MG/DL (ref 1.6–2.6)
MCH RBC QN AUTO: 29.8 PG (ref 25.2–33.5)
MCHC RBC AUTO-ENTMCNC: 29.8 G/DL (ref 28.4–34.8)
MCV RBC AUTO: 100 FL (ref 82.6–102.9)
MONOCYTES # BLD: 9 % (ref 3–12)
NRBC AUTOMATED: 0 PER 100 WBC
PDW BLD-RTO: 14.1 % (ref 11.8–14.4)
PLATELET # BLD: 187 K/UL (ref 138–453)
PLATELET ESTIMATE: ABNORMAL
PMV BLD AUTO: 9.8 FL (ref 8.1–13.5)
POTASSIUM SERPL-SCNC: 3.9 MMOL/L (ref 3.7–5.3)
RBC # BLD: 3.05 M/UL (ref 3.95–5.11)
RBC # BLD: ABNORMAL 10*6/UL
SEG NEUTROPHILS: 76 % (ref 36–65)
SEGMENTED NEUTROPHILS ABSOLUTE COUNT: 6.83 K/UL (ref 1.5–8.1)
SODIUM BLD-SCNC: 139 MMOL/L (ref 135–144)
WBC # BLD: 9 K/UL (ref 3.5–11.3)
WBC # BLD: ABNORMAL 10*3/UL

## 2021-03-01 PROCEDURE — 2580000003 HC RX 258: Performed by: STUDENT IN AN ORGANIZED HEALTH CARE EDUCATION/TRAINING PROGRAM

## 2021-03-01 PROCEDURE — 97110 THERAPEUTIC EXERCISES: CPT

## 2021-03-01 PROCEDURE — 6370000000 HC RX 637 (ALT 250 FOR IP): Performed by: STUDENT IN AN ORGANIZED HEALTH CARE EDUCATION/TRAINING PROGRAM

## 2021-03-01 PROCEDURE — 2060000000 HC ICU INTERMEDIATE R&B

## 2021-03-01 PROCEDURE — 6370000000 HC RX 637 (ALT 250 FOR IP): Performed by: INTERNAL MEDICINE

## 2021-03-01 PROCEDURE — 97116 GAIT TRAINING THERAPY: CPT

## 2021-03-01 PROCEDURE — 97530 THERAPEUTIC ACTIVITIES: CPT

## 2021-03-01 PROCEDURE — 2500000003 HC RX 250 WO HCPCS: Performed by: STUDENT IN AN ORGANIZED HEALTH CARE EDUCATION/TRAINING PROGRAM

## 2021-03-01 PROCEDURE — 36415 COLL VENOUS BLD VENIPUNCTURE: CPT

## 2021-03-01 PROCEDURE — 6360000002 HC RX W HCPCS: Performed by: INTERNAL MEDICINE

## 2021-03-01 PROCEDURE — 83735 ASSAY OF MAGNESIUM: CPT

## 2021-03-01 PROCEDURE — 80048 BASIC METABOLIC PNL TOTAL CA: CPT

## 2021-03-01 PROCEDURE — 6360000002 HC RX W HCPCS: Performed by: STUDENT IN AN ORGANIZED HEALTH CARE EDUCATION/TRAINING PROGRAM

## 2021-03-01 PROCEDURE — 94761 N-INVAS EAR/PLS OXIMETRY MLT: CPT

## 2021-03-01 PROCEDURE — 85025 COMPLETE CBC W/AUTO DIFF WBC: CPT

## 2021-03-01 PROCEDURE — 94640 AIRWAY INHALATION TREATMENT: CPT

## 2021-03-01 PROCEDURE — C9113 INJ PANTOPRAZOLE SODIUM, VIA: HCPCS | Performed by: INTERNAL MEDICINE

## 2021-03-01 PROCEDURE — 2700000000 HC OXYGEN THERAPY PER DAY

## 2021-03-01 RX ORDER — IPRATROPIUM BROMIDE AND ALBUTEROL SULFATE 2.5; .5 MG/3ML; MG/3ML
1 SOLUTION RESPIRATORY (INHALATION) EVERY 4 HOURS PRN
Status: DISCONTINUED | OUTPATIENT
Start: 2021-03-01 | End: 2021-03-08

## 2021-03-01 RX ORDER — HYDROCODONE BITARTRATE AND ACETAMINOPHEN 5; 325 MG/1; MG/1
1 TABLET ORAL EVERY 6 HOURS PRN
Status: DISCONTINUED | OUTPATIENT
Start: 2021-03-01 | End: 2021-03-07

## 2021-03-01 RX ORDER — FAMOTIDINE 20 MG/1
20 TABLET, FILM COATED ORAL DAILY PRN
COMMUNITY

## 2021-03-01 RX ORDER — ACETAMINOPHEN 325 MG/1
650 TABLET ORAL EVERY 4 HOURS PRN
Status: DISCONTINUED | OUTPATIENT
Start: 2021-03-01 | End: 2021-03-09

## 2021-03-01 RX ADMIN — HEPARIN SODIUM 5000 UNITS: 5000 INJECTION INTRAVENOUS; SUBCUTANEOUS at 05:34

## 2021-03-01 RX ADMIN — METOPROLOL TARTRATE 25 MG: 25 TABLET, FILM COATED ORAL at 09:09

## 2021-03-01 RX ADMIN — BUDESONIDE AND FORMOTEROL FUMARATE DIHYDRATE 2 PUFF: 160; 4.5 AEROSOL RESPIRATORY (INHALATION) at 21:06

## 2021-03-01 RX ADMIN — ACETAMINOPHEN 650 MG: 325 TABLET ORAL at 09:16

## 2021-03-01 RX ADMIN — HYDROCODONE BITARTRATE AND ACETAMINOPHEN 1 TABLET: 5; 325 TABLET ORAL at 12:09

## 2021-03-01 RX ADMIN — HYDROCODONE BITARTRATE AND ACETAMINOPHEN 1 TABLET: 5; 325 TABLET ORAL at 20:12

## 2021-03-01 RX ADMIN — ACETAMINOPHEN 1000 MG: 500 TABLET, COATED ORAL at 05:34

## 2021-03-01 RX ADMIN — SODIUM CHLORIDE, PRESERVATIVE FREE 10 ML: 5 INJECTION INTRAVENOUS at 09:11

## 2021-03-01 RX ADMIN — DEXTROSE MONOHYDRATE, SODIUM CHLORIDE, AND POTASSIUM CHLORIDE: 50; 4.5; 1.49 INJECTION, SOLUTION INTRAVENOUS at 16:46

## 2021-03-01 RX ADMIN — GUAIFENESIN 600 MG: 600 TABLET, EXTENDED RELEASE ORAL at 20:07

## 2021-03-01 RX ADMIN — BUPROPION HYDROCHLORIDE 150 MG: 150 TABLET, EXTENDED RELEASE ORAL at 09:08

## 2021-03-01 RX ADMIN — SPIRONOLACTONE 25 MG: 25 TABLET ORAL at 09:10

## 2021-03-01 RX ADMIN — IBUPROFEN 400 MG: 200 TABLET, FILM COATED ORAL at 05:34

## 2021-03-01 RX ADMIN — IPRATROPIUM BROMIDE AND ALBUTEROL SULFATE 1 AMPULE: .5; 3 SOLUTION RESPIRATORY (INHALATION) at 20:57

## 2021-03-01 RX ADMIN — AMIODARONE HYDROCHLORIDE 100 MG: 200 TABLET ORAL at 09:10

## 2021-03-01 RX ADMIN — SUCRALFATE 1 G: 1 TABLET ORAL at 05:34

## 2021-03-01 RX ADMIN — GUAIFENESIN 600 MG: 600 TABLET, EXTENDED RELEASE ORAL at 10:06

## 2021-03-01 RX ADMIN — FLUTICASONE PROPIONATE 1 SPRAY: 50 SPRAY, METERED NASAL at 09:11

## 2021-03-01 RX ADMIN — METOPROLOL TARTRATE 25 MG: 25 TABLET, FILM COATED ORAL at 20:07

## 2021-03-01 RX ADMIN — HEPARIN SODIUM 5000 UNITS: 5000 INJECTION INTRAVENOUS; SUBCUTANEOUS at 21:51

## 2021-03-01 RX ADMIN — ATORVASTATIN CALCIUM 20 MG: 20 TABLET, FILM COATED ORAL at 09:10

## 2021-03-01 RX ADMIN — HEPARIN SODIUM 5000 UNITS: 5000 INJECTION INTRAVENOUS; SUBCUTANEOUS at 13:02

## 2021-03-01 RX ADMIN — TORSEMIDE 20 MG: 20 TABLET ORAL at 10:28

## 2021-03-01 RX ADMIN — IPRATROPIUM BROMIDE AND ALBUTEROL SULFATE 1 AMPULE: .5; 3 SOLUTION RESPIRATORY (INHALATION) at 15:01

## 2021-03-01 RX ADMIN — SUCRALFATE 1 G: 1 TABLET ORAL at 16:33

## 2021-03-01 RX ADMIN — SUCRALFATE 1 G: 1 TABLET ORAL at 10:06

## 2021-03-01 RX ADMIN — CITALOPRAM HYDROBROMIDE 10 MG: 10 TABLET ORAL at 09:10

## 2021-03-01 RX ADMIN — PANTOPRAZOLE SODIUM 40 MG: 40 INJECTION, POWDER, FOR SOLUTION INTRAVENOUS at 09:08

## 2021-03-01 ASSESSMENT — PAIN DESCRIPTION - PROGRESSION: CLINICAL_PROGRESSION: GRADUALLY WORSENING

## 2021-03-01 ASSESSMENT — PAIN SCALES - GENERAL
PAINLEVEL_OUTOF10: 5
PAINLEVEL_OUTOF10: 2
PAINLEVEL_OUTOF10: 0
PAINLEVEL_OUTOF10: 3
PAINLEVEL_OUTOF10: 4
PAINLEVEL_OUTOF10: 5
PAINLEVEL_OUTOF10: 3
PAINLEVEL_OUTOF10: 5
PAINLEVEL_OUTOF10: 4
PAINLEVEL_OUTOF10: 0
PAINLEVEL_OUTOF10: 4

## 2021-03-01 ASSESSMENT — PAIN DESCRIPTION - PAIN TYPE
TYPE: SURGICAL PAIN

## 2021-03-01 ASSESSMENT — PAIN DESCRIPTION - ORIENTATION: ORIENTATION: MID

## 2021-03-01 ASSESSMENT — PAIN DESCRIPTION - FREQUENCY: FREQUENCY: INTERMITTENT

## 2021-03-01 ASSESSMENT — PAIN DESCRIPTION - DESCRIPTORS: DESCRIPTORS: ACHING;SORE

## 2021-03-01 ASSESSMENT — PAIN DESCRIPTION - LOCATION
LOCATION: ABDOMEN

## 2021-03-01 ASSESSMENT — PAIN DESCRIPTION - ONSET: ONSET: GRADUAL

## 2021-03-01 ASSESSMENT — PAIN - FUNCTIONAL ASSESSMENT: PAIN_FUNCTIONAL_ASSESSMENT: PREVENTS OR INTERFERES SOME ACTIVE ACTIVITIES AND ADLS

## 2021-03-01 NOTE — PROGRESS NOTES
Occupational Therapy  Facility/Department: [facility] PROGRESSIVE CARE  Daily Treatment Note  NAME: Cherry Dobbs  : 1945  MRN: [id number]    Date of Service: 3/1/2021    Discharge Recommendations:  Subacute/Skilled Nursing Facility       Assessment   Performance deficits / Impairments: Decreased functional mobility ; Decreased ADL status; Decreased safe awareness;Decreased endurance;Decreased balance;Decreased strength;Decreased sensation;Decreased high-level IADLs  Assessment: Skilled OT is indicated to increase overall IND and safety with self-care and functional tasks to return home with assistance as needed  Prognosis: Good  OT Education: Transfer Training;Precautions; Energy Conservation  Patient Education: Pt issued handouts on abdominal prec, log roll tech, EC, fall prevention and fall safety check list.  REQUIRES OT FOLLOW UP: Yes  Activity Tolerance  Activity Tolerance: Patient Tolerated treatment well;Patient limited by pain  Activity Tolerance: P+  Safety Devices  Safety Devices in place: Yes  Type of devices: Left in chair;Call light within reach;Nurse notified;Gait belt         Patient Diagnosis(es): There were no encounter diagnoses.       has a past medical history of Acute respiratory failure following trauma and surgery ([facility] [address] Adiposity, Arthralgia of multiple joints, Arthritis, Asthma, Asthma with acute exacerbation, Atopic rhinitis, CAD (coronary artery disease), CHF (congestive heart failure) ([facility]), Chronic bilateral low back pain without sciatica, Closed fracture of proximal end of humerus, COPD (chronic obstructive pulmonary disease) ([facility] [address] Depression, Diverticulitis, Diverticulosis of large intestine, Diverticulosis of large intestine without hemorrhage, Encounter for mammogram to establish baseline mammogram, Essential hypertension, H/O hysterectomy for benign disease, Heart murmur, History of blood transfusion, Hyperlipidemia, Hypothermia due to anesthesia, Laceration of chest wall, Mitral term goal 4: Pt/family to be IND with EC/WS, precautions, and fall prevention tech as well as DME/AE recommendations with use of handouts  Patient Goals   Patient goals :  To go home       Therapy Time   Individual Concurrent Group Co-treatment   Time In 1119         Time Out 1149         Minutes CORNELIO Hudson

## 2021-03-01 NOTE — CARE COORDINATION
Social Work-Met with rené to discuss dc options. Discussed SNF vs home. Patient is considering SNF, if dc in the next few days. The Plan for Transition of Care is related to the following treatment goals: SNF with PT/OT and skilled nursing    The Patient and/or patient representative patient was provided with a choice of provider and agrees   with the discharge plan. [x] Yes [] No    Freedom of choice list was provided with basic dialogue that supports the patient's individualized plan of care/goals, treatment preferences and shares the quality data associated with the providers. [x] Yes [] No. Patient would like to review list and notify  of decision.  Silvino Mcintyre

## 2021-03-01 NOTE — PROGRESS NOTES
General surgery rounding on patient. Writer discussed I/O, ostomy output and poor tolerance of FL yesterday. Will await further orders. Prophylactic measure Hyperlipidemia, unspecified hyperlipidemia type

## 2021-03-01 NOTE — PROGRESS NOTES
Colorectal Surgery Progress Note    Date: 3/1/2021; 6:03 AM    Cc: intermittent nausea     Subjective:     Pt states she is feeling better this am than she was yesterday late morning/afternoon. Denies N/V overnight. Pain controlled. Reports needing to cough. Using IS. Was out of bed to chair/up yesterday. Made NPO yesterday due to increased nausea throughout day. No significant ostomy output yet. Review of Systems - General ROS: negative for - chills or fever  Cardiovascular ROS: no chest pain or dyspnea on exertion  Pulmonary ROS: Positive for cough, negative for SOB  Gastrointestinal ROS: no nausea or vomiting  Dermatological ROS: negative for - pruritus or rash    Objective:    Vitals:    03/01/21 0400   BP: (!) 126/56   Pulse: 65   Resp: 18   Temp: 97 °F (36.1 °C)   SpO2: 97%        Gen: alert, awake, NAD  HEENT: moist mucous membranes, no scleral icterus  CV: RRR  Lung: No respiratory distress, no audible wheezing  Abdomen: soft, appropriately TTP, incision C/D/I with serous drainage in dressing; staples intact to midline incision, colostomy perfused but without any significant output  Extremities: no cyanosis or clubbing    I/O last 3 completed shifts:   In: 1080.5 [I.V.:1080.5]  Out: 2400 [Urine:2200; Emesis/NG output:200]    CBC:   Lab Results   Component Value Date    WBC 9.0 03/01/2021    RBC 3.05 03/01/2021    HGB 9.1 03/01/2021    HCT 30.5 03/01/2021    .0 03/01/2021    MCH 29.8 03/01/2021    MCHC 29.8 03/01/2021    RDW 14.1 03/01/2021     03/01/2021    MPV 9.8 03/01/2021     BMP:    Lab Results   Component Value Date     03/01/2021    K 3.9 03/01/2021     03/01/2021    CO2 29 03/01/2021    BUN 18 03/01/2021    CREATININE 1.36 03/01/2021    CALCIUM 8.5 03/01/2021    GFRAA 46 03/01/2021    LABGLOM 38 03/01/2021    GLUCOSE 96 03/01/2021       Assessment/Plan:     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 · Okay for ice chips this morning  · Increased rate of maintenance IVF due to lack of PO intake and worsening Cr. · Continue to minimize narcotics but if needed when getting up for PT/Ambulation then would be beneficial. Continue to increase mobility to assist with bowel function. · Ornelas catheter to stay in place for 2 weeks post op due to fistula. · Labs reviewed. K+ WNL. Hgb stable. No leukocytosis. Cr worsening. · Avoiding nephrotoxic agents  · Continue oral home meds  · Medical mgmt per assistance of hospitalist/Pulm CC. · Pulmonary toilet/IS  · Wound ostomy to eval  · Transferring to step down today. · DVT ppx - heparin due to elevated Cr      Vi Garcia DO  General Surgery Resident, PGY-5        I Dr. Leslie Shannon saw and examined the patient. I have edited the above and agree with the above. Nehal Ricks  Colorectal Surgery  3/1/2021

## 2021-03-01 NOTE — PROGRESS NOTES
Physical Therapy  Facility/Department: [facility]IVE [facility]  Daily Treatment Note  NAME: Cherry Dobbs  : 1945  MRN: [id number]    Date of Service: 3/1/2021    Discharge Recommendations:  Home with assist PRN, Home with Home health PT   PT Equipment Recommendations  Equipment Needed: Yes  Mobility Devices: Redell Furlong: Rolling    Assessment   Body structures, Functions, Activity limitations: Decreased functional mobility ; Decreased endurance;Decreased strength;Decreased balance;Decreased safe awareness; Increased pain  Assessment: Pt unable to amb further distance d/t increased dizziness and slight SOB. BP & SpO2 monitored during session. Continued skilled therapy will benefit patient by addressing deficits in strength, balance, safe mobility, and activity tolerance. Recommend HH PT/home with assist.  Prognosis: Excellent  Decision Making: High Complexity  PT Education: Transfer Training;Energy Conservation; Functional Mobility Training;General Safety;Home Exercise Program;Gait Training  Patient Education: Edu and monitored IS x 5 reps. Pt demo'd improvement with each rep. Created, issued, and reviewed personal seated VIRGILIO LE exercises HEP to target deficit in strength and improve stability and balance with transfers and amb. Emphasized the importance of pursed lip breathing with exertion for improved breathing. Pt demo'd good understanding with good carryover. REQUIRES PT FOLLOW UP: Yes  Activity Tolerance  Activity Tolerance: Patient limited by fatigue;Patient limited by pain; Patient limited by endurance  Activity Tolerance: Pt unable to amb further distance d/t dizziness and requested to retire to recliner. Patient Diagnosis(es): There were no encounter diagnoses.      has a past medical history of Acute respiratory failure following trauma and surgery ([facility] [address] Adiposity, Arthralgia of multiple joints, Arthritis, Asthma, Asthma with acute exacerbation, Atopic rhinitis, CAD (coronary artery disease), CHF (congestive heart failure) (HCC), Chronic bilateral low back pain without sciatica, Closed fracture of proximal end of humerus, COPD (chronic obstructive pulmonary disease) ([address] Depression, Diverticulitis, Diverticulosis of large intestine, Diverticulosis of large intestine without hemorrhage, Encounter for mammogram to establish baseline mammogram, Essential hypertension, H/O hysterectomy for benign disease, Heart murmur, History of blood transfusion, Hyperlipidemia, Hypothermia due to anesthesia, Laceration of chest wall, Mitral incompetence, Morbid obesity ([address] Non-rheumatic mitral regurgitation, Polyp of sigmoid colon, Postmenopausal status, Routine general medical examination at a health care facility, Special screening for malignant neoplasms, colon, Special screening for malignant neoplasms, vagina, and Urinary incontinence. has a past surgical history that includes Appendectomy; Mitral valve repair; Hysterectomy; bronchoscopy; Cardiac catheterization (03/22/2017); Cardiac catheterization (11/18*/2016); Wound Exploration; other surgical history; Ovary removal; eye surgery (Bilateral); Colonoscopy; Endoscopy, colon, diagnostic; fracture surgery; and Small intestine surgery (N/A, 2/25/2021). Restrictions  Restrictions/Precautions  Restrictions/Precautions: General Precautions, Surgical Protocols, Fall Risk, Up as Tolerated  Required Braces or Orthoses?: No  Position Activity Restriction  Other position/activity restrictions: 2L O2, LUE IV, incision on stomach region with dressing, colostomy, garrido cath, ambulate pt, telemetry  Subjective   General  Chart Reviewed: Yes  Family / Caregiver Present: No  Subjective  Subjective: Pt agreeable to PT treatment at this date  General Comment  Comments: CATHY Jenkins reports pt medically stable for PT treatment.   Orientation  Orientation  Overall Orientation Status: Within Functional Limits  Objective   Bed mobility  Comment: Unable to assess as pt was in recliner upon arrival.  Transfers  Sit to Stand: Contact guard assistance  Stand to sit: Contact guard assistance  Stand Pivot Transfers: Contact guard assistance  Comment: D/t dizziness pt req CGA to ensure patient safety and for line management. Pt req VCs for proper hand placement and support of UEs with sit<>stand transfers from recliner. Ambulation  Ambulation?: Yes  More Ambulation?: No  Ambulation 1  Surface: level tile  Device: Rolling Walker  Other Apparatus: O2  Assistance: Contact guard assistance  Quality of Gait: step-to gait pattern, increased time to complete  Gait Deviations: Slow Teena; Increased DAYSI; Decreased step length;Decreased step height  Distance: 15' x 2  Comments: Prior to amb. pt reported \"dizziness\" when sitting up from recliner, writer took BP (157/71 mmHg). Per pt able to progress with amb. No LOB noted, slight SOB. Pt retired to recliner and SpO2 was 97%. Neuromuscular Education  NDT Treatment: Sitting;Standing  Neuromuscular Comments: VCs req for proper breathing elian to maximize lung expansion to decrease SOB. VCs & tactile cues req for quad faciliation during VIRGILIO LE exercise to increase quad strength for proper recruitment during sit<stand transfers & proper gait. Balance  Posture: Fair  Sitting - Static: Good  Sitting - Dynamic: Good;-  Standing - Static: Fair  Standing - Dynamic: Fair;-  Comments: Standing with RW  Exercises  Comments: Performed seated VIRGILIO LE exercises: AP, ankle circles, quad sets, SLR, glute sets, quad sets x 10 reps. Pt demo'd good understanding with good carryover.    AM-PAC Score  AM-PAC Inpatient Mobility Raw Score : 14 (03/01/21 1503)  AM-PAC Inpatient T-Scale Score : 38.1 (03/01/21 1503)  Mobility Inpatient CMS 0-100% Score: 61.29 (03/01/21 1503)  Mobility Inpatient CMS G-Code Modifier : CL (03/01/21 1503)    Goals  Short term goals  Time Frame for Short term goals: 12 visits  Short term goal 1: Pt to be indep with all bed mobility, demo'ing log rolling  Short term goal 2: Pt to be indep with all functional transfers  Short term goal 3: Pt will ambulate 200ft with RW/LRAD and SBA  Short term goal 4: Pt will tolerate 25mins of PT including therex, theract, gait training and neuro re-ed to improve functional mobiilty and activity tolerance. Patient Goals   Patient goals : To feel better    Plan    Plan  Times per week: 1-2x day / 5-6 days per week  Current Treatment Recommendations: Strengthening, Transfer Training, Endurance Training, Neuromuscular Re-education, Patient/Caregiver Education & Training, Balance Training, Gait Training, Home Exercise Program, Functional Mobility Training, Safety Education & Training, Positioning  Safety Devices  Type of devices:  All fall risk precautions in place, Call light within reach, Gait belt, Nurse notified, Left in chair     Therapy Time   Individual Concurrent Group Co-treatment   Time In 1335         Time Out 1413         Minutes Bettina Barron, Student Physical Therapist Assistant

## 2021-03-01 NOTE — PROGRESS NOTES
Transitions of Care Pharmacy Service   Medication Review    The patient's list of current home medications has been reviewed. Source(s) of information: patient, medication bottles, personal med list, Care Everywhere      Please feel free to call me with any questions about this encounter. Thank you.     Ochoa Land, [facility]   Transitions of Care Pharmacy Service  Phone:  [phone]  Fax: [phone]      Electronically signed by Ochoa Land [facility] on 3/1/2021 at 4:53 PM           Medications Prior to Admission:   Calcium Carbonate (CALCIUM 600 PO), Take 600 mg by mouth daily  dextromethorphan-guaiFENesin (MUCINEX DM)  MG per extended release tablet, Take 1 tablet by mouth 2 times daily  famotidine (PEPCID) 20 MG tablet, Take 20 mg by mouth daily as needed (heartburn)  miconazole (MICOTIN) 2 % cream, Apply topically 2 times daily as needed  amiodarone (CORDARONE) 200 MG tablet, Take 100 mg by mouth three times a week Takes 1/2 tab (=100mg) Monday, Wednesday, friday  ascorbic acid (VITAMIN C) 1000 MG tablet, Take 1,000 mg by mouth every evening   torsemide (DEMADEX) 20 MG tablet, Take 60 mg by mouth daily Takes 3 tabs (=60mg) daily  acetaminophen (TYLENOL) 650 MG extended release tablet, Take 650 mg by mouth every 8 hours as needed   albuterol sulfate  (90 Base) MCG/ACT inhaler, Inhale 2 puffs into the lungs every 4 hours as needed   aspirin 325 MG tablet, Take 325 mg by mouth every evening   atorvastatin (LIPITOR) 20 MG tablet, Take 10 mg by mouth every evening Takes 1/2 tab (=10mg) nightly  Azelastine-Fluticasone 137-50 MCG/ACT SUSP, 1 spray by Nasal route 2 times daily as needed (allergies)   buPROPion (WELLBUTRIN SR) 150 MG extended release tablet, Take 150 mg by mouth 2 times daily   calcium carbonate-vitamin D (CALTRATE) 600-400 MG-UNIT TABS per tab, Take 1 tablet by mouth daily   cetirizine (ZYRTEC) 10 MG tablet, Take 10 mg by mouth every evening   vitamin D3 (CHOLECALCIFEROL) 125 MCG

## 2021-03-01 NOTE — PROGRESS NOTES
Pulmonary Critical Care Progress Note    Patient seen for the follow up of chronic respiratory failure/asthma    Subjective:    She denies chest pain. Mild occasional cough, mostly dry. Shortness of breath n is improved. She is on oxygen at 2 L nasal cannula home dose. She  Has occasional heartburn. .  Her pain is  Improving    Examination:    Vitals: /68   Pulse 64   Temp 98.1 °F (36.7 °C) (Oral)   Resp 24   Ht 5' 2\" (1.575 m)   Wt (!) 308 lb (139.7 kg)   SpO2 98%   BMI 56.33 kg/m²   SpO2  Av.3 %  Min: 96 %  Max: 100 %  General appearance: alert and cooperative with exam  Neck: No JVD  Lungs: Decreased breath sound no crackles or wheezing  Heart: regular rate and rhythm, S1, S2 normal, no gallop  Abdomen: Soft, mildly tender, + BS ostomy clean dressing  Extremities: no cyanosis or clubbing. 1+ edema    LABs:    CBC:   Recent Labs     21  0806 21  0501   WBC 9.4 9.0   HGB 9.4* 9.1*   HCT 30.9* 30.5*    187     BMP:   Recent Labs     21  0806 21  0501    139   K 3.9 3.9   CO2 29 29   BUN 17 18   CREATININE 1.32* 1.36*   LABGLOM 39* 38*   GLUCOSE 100* 96       Radiology:    Chest x-ray   Small bilateral effusions with scattered pulmonary infiltrates           lower extremity venous Dopplers  No evidence of superficial or deep venous thrombosis in both lower    extremities.          Impression:    · Acute on chronic hypoxic respiratory insufficiency, on nocturnal oxygen  · Mild pulmonary edema  · History of asthma  · Obstructive sleep apnea/morbid obesity, on CPAP therapy  · Status post exploratory laparotomy, sigmoid/superior rectum resection and creation of end colostomy and lysis of adhesions with mobilization of splenic flexure  · SHARYN  · CAD, CHF, HLD, HTN MVR    Recommendations:    · Oxygen via nasal cannula, keep SPO2 90% or greater  ·  home O2 evaluation before discharge  · Incentive spirometry every hour while awake  · Albuterol and Ipratropium Q 4 hours and prn  ·  Symbicort 160 twice daily  ·  advance diet per surgery  · On Demadex p.o. and Aldactone  · On amiodarone  · Pain control  · Zofran/antiemetics  ·  discussed with PT  · DVT prophylaxis with low molecular weight heparin        Megan Gilbert MD, [facility]  Pulmonary Critical Care and Sleep Medicine,  [facility]  Cell: [phone]  Office: [phone]

## 2021-03-01 NOTE — PLAN OF CARE
Problem: Falls - Risk of:  Goal: Will remain free from falls  Description: Will remain free from falls  3/1/2021 1342 by Alvarez Hilario RN  Outcome: Met This Shift  Note: The patient remained free from falls this shift, call light within reach, bed in locked and lowest position. Side rails up x2. Continue to monitor closely. 3/1/2021 0009 by Kaitlin Anderson RN  Outcome: Ongoing     Problem: Pain:  Goal: Pain level will decrease  Description: Pain level will decrease  3/1/2021 1342 by Alvarez Hilario RN  Outcome: Ongoing  Note: Patient states understanding of pain scale and interventions. Pain assessed with hourly rounding and PRN. Patient states pain level is mild, moderate. Will continue to monitor. 3/1/2021 0009 by Kaitlin Anderson RN  Outcome: Ongoing  Goal: Control of acute pain  Description: Control of acute pain  3/1/2021 0009 by Kaitlin Anderson RN  Outcome: Ongoing  Goal: Control of chronic pain  Description: Control of chronic pain  3/1/2021 0009 by Kaitlin Anderson RN  Outcome: Ongoing     Problem: Falls - Risk of:  Goal: Absence of physical injury  Description: Absence of physical injury  3/1/2021 0009 by Kaitlin Anderson RN  Outcome: Ongoing     Problem: Skin Integrity:  Goal: Will show no infection signs and symptoms  Description: Will show no infection signs and symptoms  3/1/2021 1342 by Alvarez Hilario RN  Outcome: Ongoing  Note: Pt's incision examined, cleansed with CHG, and new dressing applied. 3/1/2021 0009 by Kaitlin Anderson RN  Outcome: Ongoing  Goal: Absence of new skin breakdown  Description: Absence of new skin breakdown  3/1/2021 0009 by Kaitlin Anderson RN  Outcome: Ongoing     Problem: Discharge Planning:  Goal: Discharged to appropriate level of care  Description: Discharged to appropriate level of care  Outcome: Ongoing  Note: Discussed possible rehab facility vs home. Problem:  Bowel Function - Altered:  Goal: Bowel elimination is within specified parameters Description: Bowel elimination is within specified parameters  Outcome: Ongoing  Note: Pt still producing clear bloody fluid from stoma. Hypoactive bowel signs     Problem: Pain:  Goal: Pain level will decrease  Description: Pain level will decrease  3/1/2021 1342 by Claudy Balderrama RN  Outcome: Ongoing  Note: Patient states understanding of pain scale and interventions. Pain assessed with hourly rounding and PRN. Patient states pain level is mild, moderate. Will continue to monitor.     3/1/2021 0009 by Anselmo Lai RN  Outcome: Ongoing

## 2021-03-02 LAB
ABSOLUTE EOS #: 0.24 K/UL (ref 0–0.44)
ABSOLUTE IMMATURE GRANULOCYTE: 0.09 K/UL (ref 0–0.3)
ABSOLUTE LYMPH #: 0.9 K/UL (ref 1.1–3.7)
ABSOLUTE MONO #: 0.81 K/UL (ref 0.1–1.2)
ANION GAP SERPL CALCULATED.3IONS-SCNC: 8 MMOL/L (ref 9–17)
BASOPHILS # BLD: 0 % (ref 0–2)
BASOPHILS ABSOLUTE: 0.03 K/UL (ref 0–0.2)
BUN BLDV-MCNC: 15 MG/DL (ref 8–23)
BUN/CREAT BLD: 12 (ref 9–20)
CALCIUM SERPL-MCNC: 8.4 MG/DL (ref 8.6–10.4)
CHLORIDE BLD-SCNC: 102 MMOL/L (ref 98–107)
CO2: 30 MMOL/L (ref 20–31)
CREAT SERPL-MCNC: 1.26 MG/DL (ref 0.5–0.9)
DIFFERENTIAL TYPE: ABNORMAL
EOSINOPHILS RELATIVE PERCENT: 3 % (ref 1–4)
GFR AFRICAN AMERICAN: 50 ML/MIN
GFR NON-AFRICAN AMERICAN: 41 ML/MIN
GFR SERPL CREATININE-BSD FRML MDRD: ABNORMAL ML/MIN/{1.73_M2}
GFR SERPL CREATININE-BSD FRML MDRD: ABNORMAL ML/MIN/{1.73_M2}
GLUCOSE BLD-MCNC: 116 MG/DL (ref 70–99)
HCT VFR BLD CALC: 29.3 % (ref 36.3–47.1)
HEMOGLOBIN: 8.8 G/DL (ref 11.9–15.1)
IMMATURE GRANULOCYTES: 1 %
LYMPHOCYTES # BLD: 9 % (ref 24–43)
MCH RBC QN AUTO: 30 PG (ref 25.2–33.5)
MCHC RBC AUTO-ENTMCNC: 30 G/DL (ref 28.4–34.8)
MCV RBC AUTO: 100 FL (ref 82.6–102.9)
MONOCYTES # BLD: 8 % (ref 3–12)
NRBC AUTOMATED: 0 PER 100 WBC
PDW BLD-RTO: 14 % (ref 11.8–14.4)
PLATELET # BLD: 223 K/UL (ref 138–453)
PLATELET ESTIMATE: ABNORMAL
PMV BLD AUTO: 9.8 FL (ref 8.1–13.5)
POTASSIUM SERPL-SCNC: 3.9 MMOL/L (ref 3.7–5.3)
RBC # BLD: 2.93 M/UL (ref 3.95–5.11)
RBC # BLD: ABNORMAL 10*6/UL
SEG NEUTROPHILS: 79 % (ref 36–65)
SEGMENTED NEUTROPHILS ABSOLUTE COUNT: 7.6 K/UL (ref 1.5–8.1)
SODIUM BLD-SCNC: 140 MMOL/L (ref 135–144)
WBC # BLD: 9.7 K/UL (ref 3.5–11.3)
WBC # BLD: ABNORMAL 10*3/UL

## 2021-03-02 PROCEDURE — 2580000003 HC RX 258: Performed by: STUDENT IN AN ORGANIZED HEALTH CARE EDUCATION/TRAINING PROGRAM

## 2021-03-02 PROCEDURE — 6370000000 HC RX 637 (ALT 250 FOR IP): Performed by: STUDENT IN AN ORGANIZED HEALTH CARE EDUCATION/TRAINING PROGRAM

## 2021-03-02 PROCEDURE — 80048 BASIC METABOLIC PNL TOTAL CA: CPT

## 2021-03-02 PROCEDURE — 6370000000 HC RX 637 (ALT 250 FOR IP): Performed by: INTERNAL MEDICINE

## 2021-03-02 PROCEDURE — 6360000002 HC RX W HCPCS: Performed by: STUDENT IN AN ORGANIZED HEALTH CARE EDUCATION/TRAINING PROGRAM

## 2021-03-02 PROCEDURE — 97530 THERAPEUTIC ACTIVITIES: CPT

## 2021-03-02 PROCEDURE — C9113 INJ PANTOPRAZOLE SODIUM, VIA: HCPCS | Performed by: INTERNAL MEDICINE

## 2021-03-02 PROCEDURE — 97112 NEUROMUSCULAR REEDUCATION: CPT

## 2021-03-02 PROCEDURE — 2060000000 HC ICU INTERMEDIATE R&B

## 2021-03-02 PROCEDURE — 6360000002 HC RX W HCPCS: Performed by: INTERNAL MEDICINE

## 2021-03-02 PROCEDURE — 94640 AIRWAY INHALATION TREATMENT: CPT

## 2021-03-02 PROCEDURE — 2700000000 HC OXYGEN THERAPY PER DAY

## 2021-03-02 PROCEDURE — 97116 GAIT TRAINING THERAPY: CPT

## 2021-03-02 PROCEDURE — 2500000003 HC RX 250 WO HCPCS: Performed by: STUDENT IN AN ORGANIZED HEALTH CARE EDUCATION/TRAINING PROGRAM

## 2021-03-02 PROCEDURE — 36415 COLL VENOUS BLD VENIPUNCTURE: CPT

## 2021-03-02 PROCEDURE — 97110 THERAPEUTIC EXERCISES: CPT

## 2021-03-02 PROCEDURE — 97535 SELF CARE MNGMENT TRAINING: CPT

## 2021-03-02 PROCEDURE — 85025 COMPLETE CBC W/AUTO DIFF WBC: CPT

## 2021-03-02 RX ADMIN — ACETAMINOPHEN 650 MG: 325 TABLET ORAL at 11:30

## 2021-03-02 RX ADMIN — SUCRALFATE 1 G: 1 TABLET ORAL at 15:49

## 2021-03-02 RX ADMIN — HEPARIN SODIUM 5000 UNITS: 5000 INJECTION INTRAVENOUS; SUBCUTANEOUS at 21:03

## 2021-03-02 RX ADMIN — SPIRONOLACTONE 25 MG: 25 TABLET ORAL at 08:29

## 2021-03-02 RX ADMIN — GUAIFENESIN 600 MG: 600 TABLET, EXTENDED RELEASE ORAL at 08:30

## 2021-03-02 RX ADMIN — BUDESONIDE AND FORMOTEROL FUMARATE DIHYDRATE 2 PUFF: 160; 4.5 AEROSOL RESPIRATORY (INHALATION) at 09:40

## 2021-03-02 RX ADMIN — HEPARIN SODIUM 5000 UNITS: 5000 INJECTION INTRAVENOUS; SUBCUTANEOUS at 05:42

## 2021-03-02 RX ADMIN — CITALOPRAM HYDROBROMIDE 10 MG: 10 TABLET ORAL at 08:30

## 2021-03-02 RX ADMIN — DEXTROSE MONOHYDRATE, SODIUM CHLORIDE, AND POTASSIUM CHLORIDE: 50; 4.5; 1.49 INJECTION, SOLUTION INTRAVENOUS at 02:44

## 2021-03-02 RX ADMIN — PANTOPRAZOLE SODIUM 40 MG: 40 INJECTION, POWDER, FOR SOLUTION INTRAVENOUS at 08:29

## 2021-03-02 RX ADMIN — METOPROLOL TARTRATE 25 MG: 25 TABLET, FILM COATED ORAL at 20:36

## 2021-03-02 RX ADMIN — FLUTICASONE PROPIONATE 1 SPRAY: 50 SPRAY, METERED NASAL at 08:45

## 2021-03-02 RX ADMIN — METOPROLOL TARTRATE 25 MG: 25 TABLET, FILM COATED ORAL at 08:29

## 2021-03-02 RX ADMIN — SODIUM CHLORIDE, PRESERVATIVE FREE 10 ML: 5 INJECTION INTRAVENOUS at 08:43

## 2021-03-02 RX ADMIN — TORSEMIDE 20 MG: 20 TABLET ORAL at 08:29

## 2021-03-02 RX ADMIN — BUDESONIDE AND FORMOTEROL FUMARATE DIHYDRATE 2 PUFF: 160; 4.5 AEROSOL RESPIRATORY (INHALATION) at 23:28

## 2021-03-02 RX ADMIN — BUPROPION HYDROCHLORIDE 150 MG: 150 TABLET, EXTENDED RELEASE ORAL at 08:29

## 2021-03-02 RX ADMIN — SUCRALFATE 1 G: 1 TABLET ORAL at 05:42

## 2021-03-02 RX ADMIN — GUAIFENESIN 600 MG: 600 TABLET, EXTENDED RELEASE ORAL at 20:36

## 2021-03-02 RX ADMIN — SUCRALFATE 1 G: 1 TABLET ORAL at 12:16

## 2021-03-02 RX ADMIN — HEPARIN SODIUM 5000 UNITS: 5000 INJECTION INTRAVENOUS; SUBCUTANEOUS at 15:49

## 2021-03-02 RX ADMIN — ATORVASTATIN CALCIUM 20 MG: 20 TABLET, FILM COATED ORAL at 08:29

## 2021-03-02 ASSESSMENT — PAIN DESCRIPTION - PROGRESSION
CLINICAL_PROGRESSION: GRADUALLY WORSENING

## 2021-03-02 ASSESSMENT — PAIN SCALES - GENERAL
PAINLEVEL_OUTOF10: 3
PAINLEVEL_OUTOF10: 4
PAINLEVEL_OUTOF10: 1
PAINLEVEL_OUTOF10: 0

## 2021-03-02 NOTE — PLAN OF CARE
Problem: Pain:  Goal: Pain level will decrease  Description: Pain level will decrease  3/1/2021 2221 by Pankaj Neville RN  Outcome: Ongoing     Problem: Pain:  Goal: Control of acute pain  Description: Control of acute pain  Outcome: Ongoing     Problem: Falls - Risk of:  Goal: Will remain free from falls  Description: Will remain free from falls  3/1/2021 2221 by Pankaj Neville RN  Outcome: Ongoing     Problem: Skin Integrity:  Goal: Will show no infection signs and symptoms  Description: Will show no infection signs and symptoms  3/1/2021 2221 by Pankaj Neville RN  Outcome: Ongoing     Problem:  Bowel Function - Altered:  Goal: Bowel elimination is within specified parameters  Description: Bowel elimination is within specified parameters  3/1/2021 2221 by Pankaj Neville RN  Outcome: Ongoing

## 2021-03-02 NOTE — OP NOTE
[address] [facility] [address]                [address] [address], [address] [place] [address]                                OPERATIVE REPORT    PATIENT NAME: Kevin Germain                      :        1945  MED REC NO:   [id number]                             ROOM:       3105  ACCOUNT NO:   [de-identified]                           ADMIT DATE: 2021  PROVIDER:     Denisse Ricks    DATE OF PROCEDURE:  2021    PREOPERATIVE DIAGNOSES:  History of diverticulitis with colovesical  fistula. POSTOPERATIVE DIAGNOSES:  History of diverticulitis with colovesical  fistula. PROCEDURES:  1. Flexible sigmoidoscopy. 2.  Abdominal exploration, lysis of adhesions, low anterior resection  with Jung's pouch and end colostomy, mobilization of the splenic  flexure and the distal transverse colon. SURGEON:  Nehal Ricks MD    ANESTHESIA:  MAC. EBL:  Less than 75 mL. TECHNIQUE:    PROCEDURE #1:  The patient was placed under anesthesia on her left  lateral position. Digital rectal examination was done first, and it was  normal.  Then, the colonoscope was inserted through the anus into the  rectum and was advanced to the sigmoid area at about the 20-cm level. Beyond that, the scope could not be advanced any further because of  severe fixation and angulation. Most probably, this was where the  fistula between the colon and the bladder was. The scope was withdrawn  without any difficulty. PROCEDURE #2:  The patient was placed under, again, general anesthesia,  and she was placed in the lithotomy position. She had a colostomy site  marked before the surgery. She had the IV antibiotics prophylactically,  and then she was prepped and draped in the usual sterile way. A Ornelas  catheter was inserted. She also had an OG catheter. The decision was made in the midline of the abdomen. It was started  about the umbilicus and gone down all the way to the pubic area.   The  incision was made with a sharp knife, and then the rest of the incision  was finished using the Bovie. Then, the fascia level was reached. She  had a very thick abdominal wall because she was morbidly obese. The  fascia was opened, and the abdominal cavity was entered. A quick  exploration was done. The main problem was the sigmoid area, which was  especially inflamed and was adherent to the bladder area. The rest of  the examination of the abdomen was fairly normal, except that she was  morbidly obese. The retractors were inserted and then the dissection  started from the sigmoid area and the dissection continued to the pelvic  area where the inflammation was. The dissection was done using both the  Bovie and Metzenbaum scissors. The dissection was quite difficult and  it took a long time to dissect the sigmoid area from the abdominal wall  and the bladder. The ureter was identified and was clearly pushed  laterally. Then, finally, the sigmoid colon and then the upper rectum  and mid rectum were completely mobilized. It was decided to do a low  anterior resection now. A window was created in the mesentery of the  sigmoid, above the inflamed area. The sigmoid colon was divided using a  Contour stapler. Then, the mesentery of the distal part and the  inflamed part was divided using the LigaSure. The LigaSure was kept  very close to the colon to stay away from the major blood vessels or the  ureter. The mesentery continued to be divided until it reached an area  distal to the inflammation. That area was in the mid rectum. Then, the  colon was divided in the mid rectum using the Contour stapler. The  specimen was delivered without any complications. I tried to do the anastomosis if possible. There was a big gap and then  the sigmoid colon would not reach into the pelvic cavity. I had to  mobilize the left colon and then the splenic flexure and then the distal  transverse colon, and again it was not enough. Based on this  information and also based on her situation, which was again morbidly  obese, it was elected to do an end colostomy. The area which was marked  for the colostomy site was excised circumferentially using the Bovie. Then, the subcutaneous tissues and then the anterior fascia and the  posterior fascia on the left lower quadrant area were completely opened,  again, using the Bovie. I was able to pass 3 fingers through the  abdominal wound without any difficulty. Then, the sigmoid passed  through the colostomy incision, and I was able to bring it above the  skin level, at least 5 cm above. The abdominal cavity was irrigated with 2 L of warm saline. All areas  of the surgery were examined very carefully, and there was no bleeding  at all. Then, the abdominal wound was closed using two sutures from  above and below. The sutures continued and they met in the middle, and  both were tied together without any difficulty. Then, the subcutaneous  tissue was also irrigated again with warm saline. Then, the skin was  closed using the stapler. The colostomy now was matured. The staples from the end of the sigmoid  were removed, and the colostomy was created using an interrupted 4-0  Vicryl suture. The procedure was terminated without any complications. Dressing was applied. Colostomy bag was also applied. The patient  tolerated the procedure very well. The patient was transferred to the  ICU because of her comorbidities and weight. There were no  complications.         Israel Cordero    D: 03/01/2021 17:08:07       T: 03/01/2021 17:16:00     [id number]  Job#: [id number]     Doc#: [id number]

## 2021-03-02 NOTE — PROGRESS NOTES
Colorectal Surgery Progress Note    Date: 3/2/2021; 5:01 AM    Cc: intermittent nausea     Subjective:   Patient seen and chart reviewed. Transferred to PCU. No acute events overnight. Afebrile, normotensive, normal sinus rhythm, and saturating >95% on home bipap this AM. UoP 0.6 cc/kg/hr via Ornelas. 20 cc stool over past 24 hours from colostomy. Denies chest pain or shortness of breath. Denies nausea, vomiting, or diarrhea. Pain controlled on current regimen. Objective:    Vitals:    03/02/21 0405   BP: (!) 159/81   Pulse: 67   Resp: 18   Temp: 98.2 °F (36.8 °C)   SpO2: 95%        Gen: alert, awake, NAD  HEENT: moist mucous membranes, no scleral icterus  CV: RRR  Lung: No respiratory distress, no audible wheezing  Abdomen: soft, appropriately TTP, incision C/D/I, stoma viable and perfused with minimal output. Extremities: no cyanosis or clubbing    I/O last 3 completed shifts: In: 1500 [P.O.:260; I.V.:1240]  Out: 1350 [Urine:1350]    CBC:   Lab Results   Component Value Date    WBC 9.0 03/01/2021    RBC 3.05 03/01/2021    HGB 9.1 03/01/2021    HCT 30.5 03/01/2021    .0 03/01/2021    MCH 29.8 03/01/2021    MCHC 29.8 03/01/2021    RDW 14.1 03/01/2021     03/01/2021    MPV 9.8 03/01/2021     BMP:    Lab Results   Component Value Date     03/01/2021    K 3.9 03/01/2021     03/01/2021    CO2 29 03/01/2021    BUN 18 03/01/2021    CREATININE 1.36 03/01/2021    CALCIUM 8.5 03/01/2021    GFRAA 46 03/01/2021    LABGLOM 38 03/01/2021    GLUCOSE 96 03/01/2021       Assessment/Plan:     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 · Okay for ice chips this morning. · Minimize narcotics as able  · Encourage PT, ambulation, increase mobility. · Ornelas catheter to stay in place for 2 weeks post op due to fistula. · Avoid nephrotoxic agents. Monitor BMP, UoP. Continue IV hydration. · Continue oral home meds  · Medical mgmt per assistance of hospitalist/Pulm CC.   · Pulmonary toilet/IS  · Wound ostomy to eval  · DVT ppx - heparin due to elevated Cr     Moises Bamberger, MD  General Surgery PGY3  Available via [employer]  Attending: Aries Naqvi MD

## 2021-03-02 NOTE — PROGRESS NOTES
Pulmonary Critical Care Progress Note    Patient seen for the follow up of chronic respiratory failure/asthma    Subjective:    She denies chest pain. Mild occasional cough, mostly dry. Shortness of breath n is improved. She is on oxygen at 2 L nasal cannula home dose. She  Has occasional heartburn. . She has been kept n.p.o. since she did not have a bowel movement. Examination:    Vitals: BP (!) 154/64   Pulse 71   Temp 98.1 °F (36.7 °C) (Oral)   Resp 16   Ht 5' 2\" (1.575 m)   Wt (!) 314 lb 11.2 oz (142.7 kg)   SpO2 96%   BMI 57.56 kg/m²   SpO2  Av.3 %  Min: 92 %  Max: 100 %  General appearance: alert and cooperative with exam  Neck: No JVD  Lungs: Decreased breath sound no crackles or wheezing  Heart: regular rate and rhythm, S1, S2 normal, no gallop  Abdomen: Soft, mildly tender, hypoactive + BS ostomy clean dressing  Extremities: no cyanosis or clubbing. 1+ edema    LABs:    CBC:   Recent Labs     21  0501 21  0449   WBC 9.0 9.7   HGB 9.1* 8.8*   HCT 30.5* 29.3*    223     BMP:   Recent Labs     21  0501 21  0449    140   K 3.9 3.9   CO2 29 30   BUN 18 15   CREATININE 1.36* 1.26*   LABGLOM 38* 41*   GLUCOSE 96 116*       Radiology:    Chest x-ray   Small bilateral effusions with scattered pulmonary infiltrates           lower extremity venous Dopplers  No evidence of superficial or deep venous thrombosis in both lower    extremities.          Impression:    · Acute on chronic hypoxic respiratory insufficiency, on nocturnal oxygen  · Mild pulmonary edema  · History of asthma  · Obstructive sleep apnea/morbid obesity, on CPAP therapy  · Status post exploratory laparotomy, sigmoid/superior rectum resection and creation of end colostomy and lysis of adhesions with mobilization of splenic flexure  · SHARYN  · CAD, CHF, HLD, HTN MVR    Recommendations:    · Oxygen via nasal cannula, keep SPO2 90% or greater  ·  home O2 evaluation before discharge  · Incentive spirometry every hour while awake  · Albuterol and Ipratropium Q 4 hours and prn  ·  Symbicort 160 twice daily  · N.p.o. per surgery  · D5 half-normal saline at 100-hour  · Monitor creatinine  · On Demadex p.o. and Aldactone  · On amiodarone  · Pain control  · Zofran/antiemetics  · Physical therapy/increase ambulation  · DVT prophylaxis with low molecular weight heparin        Liliana Bermudez MD, [facility]  Pulmonary Critical Care and Sleep Medicine,  [facility]  Cell: [phone]  Office: [phone]

## 2021-03-02 NOTE — PROGRESS NOTES
[facility] Carmen, [facility]. S/P colectomy [Z90.49] . Vitals:    03/01/21 2057   BP:    Pulse:    Resp:    Temp:    SpO2: 94%   . Patients home meds are   Prior to Admission medications    Medication Sig Start Date End Date Taking?  Authorizing Provider   Calcium Carbonate (CALCIUM 600 PO) Take 600 mg by mouth daily   Yes Historical Provider, MD   dextromethorphan-guaiFENesin (MUCINEX DM)  MG per extended release tablet Take 1 tablet by mouth 2 times daily   Yes Historical Provider, MD   famotidine (PEPCID) 20 MG tablet Take 20 mg by mouth daily as needed (heartburn)   Yes Historical Provider, MD   miconazole (MICOTIN) 2 % cream Apply topically 2 times daily as needed   Yes Historical Provider, MD   amiodarone (CORDARONE) 200 MG tablet Take 100 mg by mouth three times a week Takes 1/2 tab (=100mg) Monday, Wednesday, friday   Yes Historical Provider, MD   ascorbic acid (VITAMIN C) 1000 MG tablet Take 1,000 mg by mouth every evening    Yes Historical Provider, MD   torsemide (DEMADEX) 20 MG tablet Take 60 mg by mouth daily Takes 3 tabs (=60mg) daily   Yes Historical Provider, MD   acetaminophen (TYLENOL) 650 MG extended release tablet Take 650 mg by mouth every 8 hours as needed    Yes Historical Provider, MD   albuterol sulfate  (90 Base) MCG/ACT inhaler Inhale 2 puffs into the lungs every 4 hours as needed    Yes Historical Provider, MD   aspirin 325 MG tablet Take 325 mg by mouth every evening    Yes Historical Provider, MD   atorvastatin (LIPITOR) 20 MG tablet Take 10 mg by mouth every evening Takes 1/2 tab (=10mg) nightly 5/30/18  Yes Historical Provider, MD   Azelastine-Fluticasone 137-50 MCG/ACT SUSP 1 spray by Nasal route 2 times daily as needed (allergies)  4/16/18  Yes Historical Provider, MD   buPROPion (WELLBUTRIN SR) 150 MG extended release tablet Take 150 mg by mouth 2 times daily  7/30/18  Yes Historical Provider, MD   calcium carbonate-vitamin D (CALTRATE) 600-400 MG-UNIT 8/24/18  Yes Historical Provider, MD   .  Recent Surgical History: None = 0     Assessment     Peak Flow (asthma only)    Predicted: n/a  Personal Best: n/a  PEF n/a  % Predicted n/a  Peak Flow : not applicable = 0    KVI4/RCE    FEV1 Predicted n/a      FEV1 n/a    FEV1 % Predicted n/a  FVC n/a  IS volume n/a  IBW   FIO2% 28%  SPO2 94  RR 22  Breath Sounds: clear/diminished in bases      · Bronchodilator assessment at level 1. Patient is at baseline. Patient states she takes treatments as needed at home.    · Hyperinflation assessment at level   · Secretion Management assessment at level    ·   · []    Bronchodilator Assessment  BRONCHODILATOR ASSESSMENT SCORE  Score 0 1 2 3 4 5   Breath Sounds   [x]  Patient Baseline []  No Wheeze good aeration []  Faint, scattered wheezing, good aeration []  Expiratory Wheezing and or moderately diminished []  Insp/Exp wheeze and/or very diminished []  Insp/Exp and/ or marked distress   Respiratory Rate   []  Patient Baseline []  Less than 20 []  Less than 20 [x]  20-25 []  Greater than 25 []  Greater than 25   Peak flow % of Pred or PB [x]  NA   []  Greater than 90%  []  81-90% []  71-80% []  Less than or equal to 70%  or unable to perform []  Unable due to Respiratory Distress   Dyspnea re [x]  Patient Baseline []  No SOB []  No SOB []  SOB on exertion []  SOB min activity []  At rest/acute   e FEV% Predicted       [x]  NA []  Above 69%  []  Unable []  Above 60-69%  []  Unable []  Above 50-59%  []  Unable []  Above 35-49%  []  Unable []  Less than 35%  []  Unable                 []  Hyperinflation Assessment  Score 1 2 3   CXR and Breath Sounds   []  Clear []  No atelectasis  Basilar aeration []  Atelectasis or absent basilar breath sounds   Incentive Spirometry Volume  (Per IBW)   []  Greater than or equal to 15ml/Kg []  less than 15ml/Kg []  less than 15ml/Kg   Surgery within last 2 weeks []  None or general   []  Abdominal or thoracic surgery  []  Abdominal or thoracic Chronic Pulmonary Historyre []  No []  Yes []  Yes     []  Secretion Management Assessment  Score 1 2 3   Bilateral Breath Sounds   []  Occasional Rhonchi []  Scattered Rhonchi []  Course Rhonchi and/or poor aeration   Sputum    []  Small amount of thin secretions []  Moderate amount of viscous secretions []  Copius, Viscious Yellow/ Secretions   CXR as reported by physician []  clear  []  Unavailable []  Infiltrates and/or consolidation  []  Unavailable []  Mucus Plugging and or lobar consolidation  []  Unavailable   Cough []  Strong, productive cough []  Weak productive cough []  No cough or weak non-productive cough

## 2021-03-02 NOTE — PROGRESS NOTES
mammogram, Essential hypertension, H/O hysterectomy for benign disease, Heart murmur, History of blood transfusion, Hyperlipidemia, Hypothermia due to anesthesia, Laceration of chest wall, Mitral incompetence, Morbid obesity ([address] Non-rheumatic mitral regurgitation, Polyp of sigmoid colon, Postmenopausal status, Routine general medical examination at a health care facility, Special screening for malignant neoplasms, colon, Special screening for malignant neoplasms, vagina, and Urinary incontinence. has a past surgical history that includes Appendectomy; Mitral valve repair; Hysterectomy; bronchoscopy; Cardiac catheterization (03/22/2017); Cardiac catheterization (11/18*/2016); Wound Exploration; other surgical history; Ovary removal; eye surgery (Bilateral); Colonoscopy; Endoscopy, colon, diagnostic; fracture surgery; and Small intestine surgery (N/A, 2/25/2021). Restrictions  Restrictions/Precautions  Restrictions/Precautions: General Precautions, Surgical Protocols, Fall Risk, Up as Tolerated  Required Braces or Orthoses?: No  Position Activity Restriction  Other position/activity restrictions: 2L O2, LUE IV, incision on stomach region with dressing, colostomy, garrido cath, ambulate pt, telemetry  Subjective   General  Chart Reviewed: Yes  Response To Previous Treatment: Not applicable  Family / Caregiver Present: No  Subjective  Subjective: Pt alert and agreeable to PT treatment. General Comment  Comments: RN Carine Malloy reports pt medically stable for PT treatment. Orientation  Orientation  Overall Orientation Status: Within Functional Limits  Cognition   Cognition  Overall Cognitive Status: Exceptions  Arousal/Alertness: Appropriate responses to stimuli  Following Commands:  Follows all commands without difficulty  Attention Span: Appears intact  Memory: Appears intact  Safety Judgement: Decreased awareness of need for safety  Problem Solving: Assistance required to correct errors made;Assistance required to identify errors made  Insights: Decreased awareness of deficits  Initiation: Requires cues for some  Sequencing: Requires cues for some  Objective   Bed mobility  Rolling to Right: Minimal assistance  Supine to Sit: Minimal assistance  Sit to Supine: Minimal assistance  Scooting: Minimal assistance; Moderate assistance  Comment: Pt req Min A wtih trunk with supine to sit EOB d/t general fatigue and mm weakness. VCs req for proper log roll sequencing & hand placement. Min-Mod A req for scooting to EOB. Transfers  Sit to Stand: Contact guard assistance  Stand to sit: Contact guard assistance  Stand Pivot Transfers: Contact guard assistance  Comment: VCs req for proper hand placement and support of UEs with sit<>stand transfers from bed and recliner. Pt completed 1 sit >stand from EOB with RW to amb, no LOB noted prior to amb. Ambulation  Ambulation?: Yes  More Ambulation?: No  Ambulation 1  Surface: level tile  Device: Rolling Walker  Other Apparatus: O2  Assistance: Contact guard assistance  Quality of Gait: step-to gait pattern, increased time to complete d/t slow teena and pt focusing significantly on pursed lip breathing between each step  Gait Deviations: Slow Teena; Increased DAYSI; Decreased step length;Decreased step height  Distance: 15' x 4  Comments: Significantly slow gait pattern. Between each step, pt takes 2-3 deep breaths before progressing forward with amb, rather than using pursed lip breathing elian as she amb. Req 2-3 min rest break in recliner during amb, d/t slight SOB per pt and RT arrived to do scheduled breathing treatment. Pt completed 1 sit<>stand from recliner with min VCs req for proper hand placement. Neuromuscular Education  NDT Treatment: Sitting;Standing;Gait   Neuromuscular Comments: VCs req for proper breathing elian & proper postural control to promote scapular retraction to optimize lung expansion, decreasing SOB.  VCs & tactile cues req for quad faciliation during VIRGILIO LE exercise to increase quad strength for proper recruitment during sit<>stand transfers. Balance  Posture: Fair  Sitting - Static: Good  Sitting - Dynamic: Fair;+  Standing - Static: Fair  Standing - Dynamic: Fair;-(BUE with RW)  Exercises  Comments: Reviewed seated VIRGILIO LE exercise HEP x 10 reps given at last treatment. Pt demo'd good understanding with good carryover. AM-PAC Score  AM-PAC Inpatient Mobility Raw Score : 15 (03/02/21 1047)  AM-PAC Inpatient T-Scale Score : 39.45 (03/02/21 1047)  Mobility Inpatient CMS 0-100% Score: 57.7 (03/02/21 1047)  Mobility Inpatient CMS G-Code Modifier : CK (03/02/21 1047)    Goals  Short term goals  Time Frame for Short term goals: 12 visits  Short term goal 1: Pt to be indep with all bed mobility, demo'ing log rolling  Short term goal 2: Pt to be indep with all functional transfers  Short term goal 3: Pt will ambulate 200ft with RW/LRAD and SBA  Short term goal 4: Pt will tolerate 25mins of PT including therex, theract, gait training and neuro re-ed to improve functional mobiilty and activity tolerance. Patient Goals   Patient goals : To feel better    Plan    Plan  Times per week: 1-2x day / 5-6 days per week  Current Treatment Recommendations: Strengthening, Transfer Training, Endurance Training, Neuromuscular Re-education, Patient/Caregiver Education & Training, Balance Training, Gait Training, Home Exercise Program, Functional Mobility Training, Safety Education & Training, Positioning  Safety Devices  Type of devices:  All fall risk precautions in place, Call light within reach, Gait belt, Nurse notified, Left in chair     Therapy Time   Individual Concurrent Group Co-treatment   Time In 0916         Time Out 1012         Minutes [address] [facility], Student Physical Therapist Assistant

## 2021-03-02 NOTE — PROGRESS NOTES
Occupational Therapy  Facility/Department: [facility] PROGRESSIVE CARE  Daily Treatment Note  NAME: Edinson Lion  : 1945  MRN: [id number]    Jackie Kay reports patient is medically stable for therapy treatment this date. Chart reviewed prior to treatment and patient is agreeable for therapy. All lines intact and patient positioned comfortably at end of treatment. All patient needs addressed prior to ending therapy session. Date of Service: 3/2/2021    Discharge Recommendations:  Subacute/Skilled Nursing Facility  OT Equipment Recommendations  Equipment Needed: Yes  Mobility Devices: ADL Assistive Devices  ADL Assistive Devices: Reacher;Long-handled Sponge;Long-handled Shoe [place];Emergency Alert System    Assessment   Performance deficits / Impairments: Decreased functional mobility ; Decreased ADL status; Decreased safe awareness;Decreased endurance;Decreased balance;Decreased strength;Decreased sensation;Decreased high-level IADLs  Assessment: Continue with OT and focus on teach and train of AE use to increase overall I and ease with LB ADL's. Prognosis: Good  OT Education: Transfer Training;Energy Conservation;OT Role;Plan of Care;Precautions; ADL Adaptive Strategies  Patient Education: safety in function, call light use/fall prevention, safety in function, edema mgt tech, benefits of therapy participation  REQUIRES OT FOLLOW UP: Yes  Activity Tolerance  Activity Tolerance: Patient limited by fatigue;Patient limited by pain  Activity Tolerance: fair minus  Safety Devices  Safety Devices in place: Yes  Type of devices: Left in chair;Call light within reach;Nurse notified;Gait belt(elevated BLE's in recliner)         Patient Diagnosis(es): There were no encounter diagnoses.       has a past medical history of Acute respiratory failure following trauma and surgery ([facility] [address] Adiposity, Arthralgia of multiple joints, Arthritis, Asthma, Asthma with acute exacerbation, Atopic rhinitis, CAD (coronary artery disease), CHF (congestive heart failure) (HCC), Chronic bilateral low back pain without sciatica, Closed fracture of proximal end of humerus, COPD (chronic obstructive pulmonary disease) ([facility] [address] Depression, Diverticulitis, Diverticulosis of large intestine, Diverticulosis of large intestine without hemorrhage, Encounter for mammogram to establish baseline mammogram, Essential hypertension, H/O hysterectomy for benign disease, Heart murmur, History of blood transfusion, Hyperlipidemia, Hypothermia due to anesthesia, Laceration of chest wall, Mitral incompetence, Morbid obesity ([address] Non-rheumatic mitral regurgitation, Polyp of sigmoid colon, Postmenopausal status, Routine general medical examination at a health care facility, Special screening for malignant neoplasms, colon, Special screening for malignant neoplasms, vagina, and Urinary incontinence. has a past surgical history that includes Appendectomy; Mitral valve repair; Hysterectomy; bronchoscopy; Cardiac catheterization (03/22/2017); Cardiac catheterization (11/18*/2016); Wound Exploration; other surgical history; Ovary removal; eye surgery (Bilateral); Colonoscopy; Endoscopy, colon, diagnostic; fracture surgery; and Small intestine surgery (N/A, 2/25/2021). Restrictions  Restrictions/Precautions  Restrictions/Precautions: General Precautions, Surgical Protocols, Fall Risk, Up as Tolerated  Required Braces or Orthoses?: No  Position Activity Restriction  Other position/activity restrictions: 2L O2, LUE IV, incision on stomach region with dressing, colostomy, garrido cath, ambulate pt, telemetry, NPO/ice chips  Subjective   General  Chart Reviewed: Yes  Patient assessed for rehabilitation services?: Yes  Response to previous treatment: Patient with no complaints from previous session  Family / Caregiver Present: No  Pre Treatment Pain Screening  Intervention List: Nurse/Physician notified  Comments / Details: Pt states she has pain in her abd incision and rates 4/10. Vital Signs  Patient Currently in Pain: Yes   Orientation  Orientation  Overall Orientation Status: Within Functional Limits  Objective    ADL  Grooming: Setup;Contact guard assistance(to stand at tabletop with RW for oral care and CG for balance and safety; pt was seated in recliner and washed her face with makeup wipe/applied moisturizer and completed hair care as well)  UE Bathing: Unable to assess(pt states she washed up with PCT prior to writer arrival)  LE Bathing: Unable to assess  UE Dressing: Setup;Minimal assistance(to adjust hosp gown and tie to increase pt's modesty)  LE Dressing: Setup;Maximum assistance(to herminia largest size of B  socks due to BLE edema and other socks too tight)  Toileting: Dependent/Total(hema)  Additional Comments: Pt was edu on pursed lip breathing tech and EC/WS tech of pacing self, resting and taking breaks as needed, sitting vs standing with self care tasks. Pt with good carry over. Balance  Sitting Balance: Stand by assistance  Standing Balance: Contact guard assistance  Standing Balance  Time: 3 plus mins with grooming tasks standing with RW  Functional Mobility  Functional - Mobility Device: Rolling Walker  Activity: (recliner to door and back to recliner)  Assist Level: Contact guard assistance  Functional Mobility Comments: MIN cues needed for upright posture, scanning, pursed lip breathing, RW safety and awareness/assist with all lines to increase safety/reduce falls. Toilet Transfers  Toilet Transfers Comments: N/T and pt with garrido  Bed mobility  Supine to Sit: Unable to assess  Sit to Supine: Unable to assess  Comment: Pt  up in recliner upon arrival.  BLE's elevated and pillow under to reduce swelling/increase comfort. Pt was edu on recommendations for elevation and B ankle AROM/pumping as able to reduce edema and pt with good understanding.   Transfers  Stand Step Transfers: Contact guard assistance(with increased time and use of RW)  Sit to stand: if needed.        Therapy Time   Individual Concurrent Group Co-treatment   Time In [address]         Time Out 1121         Minutes [address]

## 2021-03-02 NOTE — PLAN OF CARE
Nutrition Problem #1: Mild malnutrition, In context of acute illness or injury  Intervention: Food and/or Nutrient Delivery: Continue NPO  Nutritional Goals: Diet advancement/tolerance

## 2021-03-02 NOTE — PROGRESS NOTES
Class 3 (BMI 40.0 or greater)       Nutrition Diagnosis:   · Mild malnutrition, In context of acute illness or injury related to altered GI function as evidenced by NPO or clear liquid status due to medical condition, mild loss of subcutaneous fat, GI abnormality, nausea, vomiting, constipation    Nutrition Interventions:   Food and/or Nutrient Delivery:  Continue NPO  Nutrition Education/Counseling:  Education not indicated   Coordination of Nutrition Care:  Continue to monitor while inpatient    Goals:  Diet advancement/tolerance       Nutrition Monitoring and Evaluation:   Behavioral-Environmental Outcomes:  None Identified   Food/Nutrient Intake Outcomes:  Diet Advancement/Tolerance  Physical Signs/Symptoms Outcomes:  Biochemical Data, GI Status, Nausea or Vomiting, Constipation, Skin, Weight     Discharge Planning:     Too soon to determine     Allan Smith, [address] [place] [address], [address] [place] [address]  Office Number: [phone]

## 2021-03-02 NOTE — PROGRESS NOTES
Subjective:     Follow-up CHF  Denies any chest pain no palpitation occasional cough no shortness of breath no wheezing  ROS  No fever no chills no  or cardiac complaints, no abdominal pain no nausea no vomiting having flatus, no TIA no bleeding no headache no sore throat  physical exam  General Appearance: alert and oriented to person, place and time and in no acute distress  Skin: warm and dry, no rash or erythema  Head: normocephalic and atraumatic  Eyes: pupils equal, round, and reactive to light, extraocular eye movements intact, conjunctivae normal, pupils equal, round, and reactive to light and sclera anicteric    Neck: neck supple and non tender without mass   Pulmonary/Chest: clear to auscultation bilaterally- no wheezes, rales or rhonchi, normal air movement, no respiratory distress  Cardiovascular: normal rate, regular rhythm, normal S1 and S2, no gallops, intact distal pulses and no carotid bruits  Abdomen: soft, non-tender, non-distended and bowel sounds    Colostomy  Extremities: Trace edema good pulses negative Homans' sign  Neurologic: Alert oriented x3 with no focal deficit    /88   Pulse 68   Temp 97.9 °F (36.6 °C) (Oral)   Resp 18   Ht 5' 2\" (1.575 m)   Wt (!) 314 lb 11.2 oz (142.7 kg)   SpO2 98%   BMI 57.56 kg/m²     CBC:   Lab Results   Component Value Date    WBC 9.7 03/02/2021    RBC 2.93 03/02/2021    HGB 8.8 03/02/2021    HCT 29.3 03/02/2021    .0 03/02/2021    MCH 30.0 03/02/2021    MCHC 30.0 03/02/2021    RDW 14.0 03/02/2021     03/02/2021    MPV 9.8 03/02/2021     BMP:    Lab Results   Component Value Date     03/02/2021    K 3.9 03/02/2021     03/02/2021    CO2 30 03/02/2021    BUN 15 03/02/2021    CREATININE 1.26 03/02/2021    CALCIUM 8.4 03/02/2021    GFRAA 50 03/02/2021    LABGLOM 41 03/02/2021    GLUCOSE 116 03/02/2021        Assessment:  Patient Active Problem List   Diagnosis    Acute respiratory failure following trauma and surgery ([facility] [address]  Adiposity    Arthralgia of multiple joints    Asthma    Asthma with acute exacerbation    Atopic rhinitis    CHF (congestive heart failure) (HCC)    Chronic bilateral low back pain without sciatica    Closed fracture of proximal end of humerus    Depression    Diverticulitis    Diverticulosis of large intestine    Diverticulosis of large intestine without hemorrhage    Essential hypertension    H/O hysterectomy for benign disease    Heart murmur    Hyperlipidemia    Hypothermia due to anesthesia    Laceration of chest wall    Mitral incompetence    Morbid obesity (HCC)    Non-rheumatic mitral regurgitation    Polyp of sigmoid colon    Postmenopausal status    Urinary incontinence    S/P colectomy     Acute on chronic respiratory failure managed by pulmonary  Morbid obesity excess calories advised weight loss  JAYDEN on CPAP  CKD 3 A avoid nephrotoxic drugs  Acute on chronic diastolic CHF much better compensated  Acute blood loss anemia  COPD  Mild paralytic ileus postoperative doing much better  Plan:    Labs reviewed avoid nephrotoxic drugs continue with DVT physical therapy occupational therapy continue with water pills see orders      Nicholas Vicente MD  6:25 PM

## 2021-03-02 NOTE — DISCHARGE INSTR - COC
Continuity of Care Form    Patient Name: Keo Houser   :  1945  MRN:  [id number]    Admit date:  2021  Discharge date:  3/12/2021    Code Status Order: Full Code   Advance Directives:   [facility] Documentation       Date/Time Healthcare Directive Type of Healthcare Directive Copy in [address] Agent's Name Healthcare Agent's Phone Number    [phone]  Yes, patient has an advance directive for healthcare treatment -- --                  Admitting Physician:  Leoncio Rosas MD  PCP: ALEX Agrawal CNP    Discharging Nurse: [facility] Unit/Room#: 1008/1008-02  Discharging Unit Phone Number: [phone]    Emergency Contact:   Extended Emergency Contact Information  Primary Emergency Contact: Gaston Ball [address] Phone: [phone]  Relation: Spouse  Secondary Emergency Contact: Liliane Scott [address] Phone: [phone]  Relation: Other    Past Surgical History:  Past Surgical History:   Procedure Laterality Date    APPENDECTOMY      BRONCHOSCOPY      CARDIAC CATHETERIZATION  2017    CARDIAC CATHETERIZATION      COLONOSCOPY      ENDOSCOPY, COLON, DIAGNOSTIC      EYE SURGERY Bilateral     cataract    FRACTURE SURGERY      clavicle/shoulder    HYSTERECTOMY      MITRAL VALVE REPAIR      OTHER SURGICAL HISTORY      Minimally invasive MVR 32MMCG future ring/kyle/femoral cannulation    OVARY REMOVAL      SMALL INTESTINE SURGERY N/A 2021    COLONOSCOPY WITH ABDOMINAL EXPLORATION   WITH BOWEL RESECTION LOW ANTERIOR   REPAIR  COLO VESSICLE FISTULA,   STOMA,  LYSIS OF ADHESIONS performed by Leoncio Rosas MD at [facility]      arterial bleed       Immunization History: There is no immunization history on file for this patient.     Active Problems:  Patient Active Problem List   Diagnosis Code    Acute respiratory failure following trauma and March 15. Results to [facility]       Patient's personal belongings (please select all that are sent with patient):  Glasses, Dentures upper    RN SIGNATURE:  Electronically signed by Roxi Wilson RN on 3/12/21 at 11:45 AM EST    CASE MANAGEMENT/SOCIAL WORK SECTION    Inpatient Status Date: 2/25    Readmission Risk Assessment Score:  Readmission Risk              Risk of Unplanned Readmission:        15           Discharging to Facility/ Agency    Name: Your care will be continued at a skilled nursing facility:   Name:  [facility] Address: [address] [place] [address] [place] [address]   Phone:  [phone]     ·   · Address:  · Phone:  · [employer]ann[employer] (if applicable)   · Name:  · Address:  · Dialysis Schedule:  · Phone:  · Fax:    / signature: Electronically signed by PO Hernandez on 3/2/21 at 11:37 AM EST    PHYSICIAN SECTION    Prognosis: Good    Condition at Discharge: Stable    Rehab Potential (if transferring to Rehab): Good    Recommended Labs or Other Treatments After Discharge: PT/INR 3/15/21. BMP in one week. Fax results to Dr. Ciara Calvert [phone]. Physician Certification: I certify the above information and transfer of Stuart Camargo  is necessary for the continuing treatment of the diagnosis listed and that she requires [facility] for less 30 days.      Update Admission H&P: No change in H&P    PHYSICIAN SIGNATURE:  Electronically signed by Jason Patterson MD on 3/12/21 at 8:57 AM EST

## 2021-03-03 LAB
ABSOLUTE EOS #: 0.29 K/UL (ref 0–0.44)
ABSOLUTE IMMATURE GRANULOCYTE: 0.14 K/UL (ref 0–0.3)
ABSOLUTE LYMPH #: 0.95 K/UL (ref 1.1–3.7)
ABSOLUTE MONO #: 0.99 K/UL (ref 0.1–1.2)
ANION GAP SERPL CALCULATED.3IONS-SCNC: 8 MMOL/L (ref 9–17)
BASOPHILS # BLD: 0 % (ref 0–2)
BASOPHILS ABSOLUTE: 0.03 K/UL (ref 0–0.2)
BUN BLDV-MCNC: 14 MG/DL (ref 8–23)
BUN/CREAT BLD: 12 (ref 9–20)
CALCIUM SERPL-MCNC: 8.2 MG/DL (ref 8.6–10.4)
CHLORIDE BLD-SCNC: 102 MMOL/L (ref 98–107)
CO2: 29 MMOL/L (ref 20–31)
CREAT SERPL-MCNC: 1.19 MG/DL (ref 0.5–0.9)
DIFFERENTIAL TYPE: ABNORMAL
EOSINOPHILS RELATIVE PERCENT: 3 % (ref 1–4)
GFR AFRICAN AMERICAN: 53 ML/MIN
GFR NON-AFRICAN AMERICAN: 44 ML/MIN
GFR SERPL CREATININE-BSD FRML MDRD: ABNORMAL ML/MIN/{1.73_M2}
GFR SERPL CREATININE-BSD FRML MDRD: ABNORMAL ML/MIN/{1.73_M2}
GLUCOSE BLD-MCNC: 115 MG/DL (ref 70–99)
HCT VFR BLD CALC: 29.4 % (ref 36.3–47.1)
HEMOGLOBIN: 8.8 G/DL (ref 11.9–15.1)
IMMATURE GRANULOCYTES: 1 %
LYMPHOCYTES # BLD: 9 % (ref 24–43)
MCH RBC QN AUTO: 29.8 PG (ref 25.2–33.5)
MCHC RBC AUTO-ENTMCNC: 29.9 G/DL (ref 28.4–34.8)
MCV RBC AUTO: 99.7 FL (ref 82.6–102.9)
MONOCYTES # BLD: 9 % (ref 3–12)
NRBC AUTOMATED: 0 PER 100 WBC
PDW BLD-RTO: 14.2 % (ref 11.8–14.4)
PLATELET # BLD: 225 K/UL (ref 138–453)
PLATELET ESTIMATE: ABNORMAL
PMV BLD AUTO: 9.6 FL (ref 8.1–13.5)
POTASSIUM SERPL-SCNC: 3.9 MMOL/L (ref 3.7–5.3)
RBC # BLD: 2.95 M/UL (ref 3.95–5.11)
RBC # BLD: ABNORMAL 10*6/UL
SEG NEUTROPHILS: 78 % (ref 36–65)
SEGMENTED NEUTROPHILS ABSOLUTE COUNT: 8.3 K/UL (ref 1.5–8.1)
SODIUM BLD-SCNC: 139 MMOL/L (ref 135–144)
WBC # BLD: 10.7 K/UL (ref 3.5–11.3)
WBC # BLD: ABNORMAL 10*3/UL

## 2021-03-03 PROCEDURE — 6370000000 HC RX 637 (ALT 250 FOR IP): Performed by: STUDENT IN AN ORGANIZED HEALTH CARE EDUCATION/TRAINING PROGRAM

## 2021-03-03 PROCEDURE — 6370000000 HC RX 637 (ALT 250 FOR IP): Performed by: INTERNAL MEDICINE

## 2021-03-03 PROCEDURE — 97110 THERAPEUTIC EXERCISES: CPT

## 2021-03-03 PROCEDURE — 97112 NEUROMUSCULAR REEDUCATION: CPT

## 2021-03-03 PROCEDURE — 2580000003 HC RX 258: Performed by: STUDENT IN AN ORGANIZED HEALTH CARE EDUCATION/TRAINING PROGRAM

## 2021-03-03 PROCEDURE — 85025 COMPLETE CBC W/AUTO DIFF WBC: CPT

## 2021-03-03 PROCEDURE — 2700000000 HC OXYGEN THERAPY PER DAY

## 2021-03-03 PROCEDURE — 6360000002 HC RX W HCPCS: Performed by: STUDENT IN AN ORGANIZED HEALTH CARE EDUCATION/TRAINING PROGRAM

## 2021-03-03 PROCEDURE — 80048 BASIC METABOLIC PNL TOTAL CA: CPT

## 2021-03-03 PROCEDURE — C9113 INJ PANTOPRAZOLE SODIUM, VIA: HCPCS | Performed by: INTERNAL MEDICINE

## 2021-03-03 PROCEDURE — 2500000003 HC RX 250 WO HCPCS: Performed by: INTERNAL MEDICINE

## 2021-03-03 PROCEDURE — 99213 OFFICE O/P EST LOW 20 MIN: CPT

## 2021-03-03 PROCEDURE — 36415 COLL VENOUS BLD VENIPUNCTURE: CPT

## 2021-03-03 PROCEDURE — 97530 THERAPEUTIC ACTIVITIES: CPT

## 2021-03-03 PROCEDURE — 94761 N-INVAS EAR/PLS OXIMETRY MLT: CPT

## 2021-03-03 PROCEDURE — 97535 SELF CARE MNGMENT TRAINING: CPT

## 2021-03-03 PROCEDURE — 6360000002 HC RX W HCPCS: Performed by: INTERNAL MEDICINE

## 2021-03-03 PROCEDURE — 2060000000 HC ICU INTERMEDIATE R&B

## 2021-03-03 PROCEDURE — 94640 AIRWAY INHALATION TREATMENT: CPT

## 2021-03-03 PROCEDURE — 97116 GAIT TRAINING THERAPY: CPT

## 2021-03-03 RX ADMIN — CITALOPRAM HYDROBROMIDE 10 MG: 10 TABLET ORAL at 08:36

## 2021-03-03 RX ADMIN — ACETAMINOPHEN 650 MG: 325 TABLET ORAL at 08:45

## 2021-03-03 RX ADMIN — PANTOPRAZOLE SODIUM 40 MG: 40 INJECTION, POWDER, FOR SOLUTION INTRAVENOUS at 08:36

## 2021-03-03 RX ADMIN — SODIUM CHLORIDE, PRESERVATIVE FREE 10 ML: 5 INJECTION INTRAVENOUS at 08:37

## 2021-03-03 RX ADMIN — ATORVASTATIN CALCIUM 20 MG: 20 TABLET, FILM COATED ORAL at 08:36

## 2021-03-03 RX ADMIN — BUDESONIDE AND FORMOTEROL FUMARATE DIHYDRATE 2 PUFF: 160; 4.5 AEROSOL RESPIRATORY (INHALATION) at 20:46

## 2021-03-03 RX ADMIN — HEPARIN SODIUM 5000 UNITS: 5000 INJECTION INTRAVENOUS; SUBCUTANEOUS at 06:34

## 2021-03-03 RX ADMIN — HEPARIN SODIUM 5000 UNITS: 5000 INJECTION INTRAVENOUS; SUBCUTANEOUS at 15:12

## 2021-03-03 RX ADMIN — DEXTROSE MONOHYDRATE, SODIUM CHLORIDE, AND POTASSIUM CHLORIDE: 50; 4.5; 1.49 INJECTION, SOLUTION INTRAVENOUS at 13:20

## 2021-03-03 RX ADMIN — SUCRALFATE 1 G: 1 TABLET ORAL at 11:58

## 2021-03-03 RX ADMIN — AMIODARONE HYDROCHLORIDE 100 MG: 200 TABLET ORAL at 08:36

## 2021-03-03 RX ADMIN — TORSEMIDE 20 MG: 20 TABLET ORAL at 08:36

## 2021-03-03 RX ADMIN — GUAIFENESIN 600 MG: 600 TABLET, EXTENDED RELEASE ORAL at 21:07

## 2021-03-03 RX ADMIN — ACETAMINOPHEN 650 MG: 325 TABLET ORAL at 21:13

## 2021-03-03 RX ADMIN — ALBUTEROL SULFATE 2.5 MG: 2.5 SOLUTION RESPIRATORY (INHALATION) at 20:49

## 2021-03-03 RX ADMIN — HEPARIN SODIUM 5000 UNITS: 5000 INJECTION INTRAVENOUS; SUBCUTANEOUS at 21:06

## 2021-03-03 RX ADMIN — SPIRONOLACTONE 25 MG: 25 TABLET ORAL at 08:36

## 2021-03-03 RX ADMIN — SUCRALFATE 1 G: 1 TABLET ORAL at 06:27

## 2021-03-03 RX ADMIN — METOPROLOL TARTRATE 25 MG: 25 TABLET, FILM COATED ORAL at 21:07

## 2021-03-03 RX ADMIN — BUPROPION HYDROCHLORIDE 150 MG: 150 TABLET, EXTENDED RELEASE ORAL at 08:36

## 2021-03-03 RX ADMIN — GUAIFENESIN 600 MG: 600 TABLET, EXTENDED RELEASE ORAL at 08:36

## 2021-03-03 RX ADMIN — FLUTICASONE PROPIONATE 1 SPRAY: 50 SPRAY, METERED NASAL at 08:37

## 2021-03-03 RX ADMIN — SUCRALFATE 1 G: 1 TABLET ORAL at 15:12

## 2021-03-03 RX ADMIN — BUDESONIDE AND FORMOTEROL FUMARATE DIHYDRATE 2 PUFF: 160; 4.5 AEROSOL RESPIRATORY (INHALATION) at 09:55

## 2021-03-03 RX ADMIN — METOPROLOL TARTRATE 25 MG: 25 TABLET, FILM COATED ORAL at 08:36

## 2021-03-03 ASSESSMENT — PAIN DESCRIPTION - PROGRESSION
CLINICAL_PROGRESSION: GRADUALLY WORSENING

## 2021-03-03 ASSESSMENT — PAIN SCALES - GENERAL
PAINLEVEL_OUTOF10: 2
PAINLEVEL_OUTOF10: 3
PAINLEVEL_OUTOF10: 0
PAINLEVEL_OUTOF10: 1
PAINLEVEL_OUTOF10: 3

## 2021-03-03 NOTE — PROGRESS NOTES
with acute exacerbation    Atopic rhinitis    CHF (congestive heart failure) ([facility])    Chronic bilateral low back pain without sciatica    Closed fracture of proximal end of humerus    Depression    Diverticulitis    Diverticulosis of large intestine    Diverticulosis of large intestine without hemorrhage    Essential hypertension    H/O hysterectomy for benign disease    Heart murmur    Hyperlipidemia    Hypothermia due to anesthesia    Laceration of chest wall    Mitral incompetence    Morbid obesity (HCC)    Non-rheumatic mitral regurgitation    Polyp of sigmoid colon    Postmenopausal status    Urinary incontinence    S/P colectomy     Acute on chronic respiratory failure managed by pulmonary  Morbid obesity excess calories advised weight loss  JAYDEN on CPAP  CKD 3A avoid nephrotoxic drugs  Acute blood loss anemia  Acute on chronic diastolic CHF better compensated  COPD  Postoperative paralytic ileus resolved  Plan:    Meds labs reviewed continue with DVT prophylaxis continue physical therapy occupational therapy diuretics, diet    Surgery avoid nephrotoxic drugs see orders add MARIA TERESA hose knee-high      Amy Fernandez MD  6:04 PM

## 2021-03-03 NOTE — PLAN OF CARE
Problem: Falls - Risk of:  Goal: Will remain free from falls  Description: Will remain free from falls  3/2/2021 2205 by Carolina White RN  Outcome: Ongoing  Note: Patient will remain free from falls this shift, call light is within reach, bed in locked and lowest position. Side rails up x2. Encouraged to call for assistance with transferring. Will continue to monitor. Problem:  Bowel Function - Altered:  Goal: Bowel elimination is within specified parameters  Description: Bowel elimination is within specified parameters  3/2/2021 2205 by Carolina White RN  Outcome: Ongoing     Problem: Venous Thromboembolism:  Goal: Will show no signs or symptoms of venous thromboembolism  Description: Will show no signs or symptoms of venous thromboembolism  3/2/2021 2205 by Carolina White RN  Outcome: Ongoing

## 2021-03-03 NOTE — PROGRESS NOTES
establish baseline mammogram, Essential hypertension, H/O hysterectomy for benign disease, Heart murmur, History of blood transfusion, Hyperlipidemia, Hypothermia due to anesthesia, Laceration of chest wall, Mitral incompetence, Morbid obesity ([address] Non-rheumatic mitral regurgitation, Polyp of sigmoid colon, Postmenopausal status, Routine general medical examination at a health care facility, Special screening for malignant neoplasms, colon, Special screening for malignant neoplasms, vagina, and Urinary incontinence. has a past surgical history that includes Appendectomy; Mitral valve repair; Hysterectomy; bronchoscopy; Cardiac catheterization (03/22/2017); Cardiac catheterization (11/18*/2016); Wound Exploration; other surgical history; Ovary removal; eye surgery (Bilateral); Colonoscopy; Endoscopy, colon, diagnostic; fracture surgery; and Small intestine surgery (N/A, 2/25/2021). Restrictions  Restrictions/Precautions  Restrictions/Precautions: General Precautions, Surgical Protocols, Fall Risk, Up as Tolerated  Required Braces or Orthoses?: No  Position Activity Restriction  Other position/activity restrictions: 2L O2, RUE IV, incision on stomach region with dressing, colostomy, garrido cath, ambulate pt, telemetry  Subjective   General  Chart Reviewed: Yes  Response To Previous Treatment: Not applicable  Family / Caregiver Present: No  Subjective  Subjective: Pt alert and agreeable to PT treatment. General Comment  Comments: CATHY Russell reports pt medically stable for PT treatment.   Pain Screening  Patient Currently in Pain: Denies  Vital Signs  Patient Currently in Pain: Denies   Orientation  Orientation  Overall Orientation Status: Within Functional Limits  Objective   Bed mobility  Comment: Unable to assess as pt was up in recliner upon arrival  Transfers  Sit to Stand: Contact guard assistance  Stand to sit: Contact guard assistance  Stand Pivot Transfers: Contact guard assistance  Comment: Pt demo'd proper hand placement and support of UEs with sit<>stand transfers. During stand>sit, pt demo'd slow eccentric control with proper elian of UE support. Ambulation  Ambulation?: Yes  More Ambulation?: No  Ambulation 1  Surface: level tile  Device: Rolling Walker  Other Apparatus: O2  Assistance: Contact guard assistance  Quality of Gait: step-to gait pattern, increased time to complete  Gait Deviations: Slow Teena; Increased DAYSI; Decreased step length;Decreased step height  Distance: 60' x 2  Comments: Pt demo'd better understanding with pursed lip breathing elian with amb. Able to amb with continuous steps while performing pursed lip breathing elian more naturally. Pt req 2-3 min rest break in recliner in between d/t slight SOB per pt, SpO2 98% seated in recliner. Neuromuscular Education  NDT Treatment: Sitting;Standing;Gait   Neuromuscular Comments: VCs req for proper breathing elian & proper postural control to promote scapular retraction to optimize lung expansion, decreasing SOB. VCs & tactile cues req for quad faciliation during VIRGILIO LE exercise to increase quad strength for proper recruitment during sit<>stand transfers. Balance  Posture: Fair  Sitting - Static: Good  Sitting - Dynamic: Fair;-  Standing - Static: Fair  Standing - Dynamic: Fair;-  Comments: Dynamic sitting in chair reaches x 10 reps to engage core strength and trunk stability. Pt req support with UE on chair of arm when opp UE reached for increased stability and balance. Pt stood with BUE support of RW for 2 minutes while writer arranged lines, no LOB noted. Exercises  Comments: Pt performed VIRGILIO LE exercises x 10 reps. Added hip add pillow squeezes to promote hip add mm facilitation and increase strength.      AM-PAC Score  AM-PAC Inpatient Mobility Raw Score : 15 (03/02/21 1047)  AM-PAC Inpatient T-Scale Score : 39.45 (03/02/21 1047)  Mobility Inpatient CMS 0-100% Score: 57.7 (03/02/21 1047)  Mobility Inpatient CMS G-Code Modifier : CK (03/02/21

## 2021-03-03 NOTE — PROGRESS NOTES
Pulmonary Critical Care Progress Note  Alonso Estrella MD     Patient seen for the follow up of acute on chronic hypoxic respiratory failure, mild pulmonary edema, obesity/obstructive sleep apnea    Subjective:  She is sitting up in the bedside chair, no distress. She is on 2 L of oxygen. Shortness of breath is not much change since yesterday. She feels like she cannot get a deep breath in times. He has been using her incentive spirometer she reports. She has occasional productive cough with white sputum. She does not have any chest pain. She was started on clear liquids today and is tolerating well. She has not had any output from her colostomy in the past few days. Examination:  Vitals: /61   Pulse 63   Temp 97.9 °F (36.6 °C) (Oral)   Resp 18   Ht 5' 2\" (1.575 m)   Wt (!) 314 lb 11.2 oz (142.7 kg)   SpO2 98%   BMI 57.56 kg/m²   General appearance: alert and cooperative with exam, up in chair  Neck: No JVD  Lungs: Moderate air exchange, occasional crackle, no wheezing  Heart: regular rate and rhythm, S1, S2 normal, no gallop  Abdomen: Soft, non tender, + BS  Extremities: no cyanosis or clubbing.  No significant edema    LABs:  CBC:   Recent Labs     03/02/21  0449 03/03/21  0511   WBC 9.7 10.7   HGB 8.8* 8.8*   HCT 29.3* 29.4*    225     BMP:   Recent Labs     03/02/21  0449 03/03/21  0511    139   K 3.9 3.9   CO2 30 29   BUN 15 14   CREATININE 1.26* 1.19*   LABGLOM 41* 44*   GLUCOSE 116* 115*     Radiology:  2/28/2021      Impression:  · Acute on chronic hypoxic respiratory failure, on nocturnal oxygen  · Mild pulmonary edema  · History of asthma  · Obesity/Obstructive sleep apnea, on CPAP therapy  · S/p exploratory laparotomy, sigmoid/superior rectum resection and end colostomy, lysis of adhesions with mobilization of splenic flexure  · SHARYN  · CAD, CHF, HLD, HTN, MVR    Recommendations:  · Oxygen via nasal cannula, keep SPO2 greater than 90%  · Home oxygen evaluation prior to discharge  · Incentive spirometry every hour while awake  · Albuterol every 4 hours as needed  · Symbicort  · Mucinex  · Diet as per surgery  · Wound care  · Continue IV fluids, monitor creatinine  · X-ray chest in a.m.  · PT/OT, increase activity  · DVT prophylaxis with subcu heparin  · Will follow with you    Electronically signed by     Paz Enrique MD on 3/3/2021 at 3:56 PM  Pulmonary Critical Care and Sleep Medicine,  [facility]  Cell: [phone]  Office: [phone]

## 2021-03-03 NOTE — PROGRESS NOTES
Occupational Therapy  Facility/Department: [facility] PROGRESSIVE CARE  Daily Treatment Note  NAME: Dhaval Felipe  : 1945  MRN: [id number]    Date of Service: 3/3/2021    Discharge Recommendations:  Subacute/Skilled Nursing Facility       Assessment   Performance deficits / Impairments: Decreased functional mobility ; Decreased ADL status; Decreased safe awareness;Decreased endurance;Decreased balance;Decreased strength;Decreased sensation;Decreased high-level IADLs  Assessment: Continue with OT and focus on teach and train of AE use to increase overall I and ease with LB ADL's. Prognosis: Good  OT Education: Home Exercise Program  REQUIRES OT FOLLOW UP: Yes  Activity Tolerance  Activity Tolerance: Patient Tolerated treatment well  Activity Tolerance: Fair  Safety Devices  Safety Devices in place: Yes  Type of devices: Left in chair;Call light within reach;Nurse notified;Gait belt         Patient Diagnosis(es): There were no encounter diagnoses.       has a past medical history of Acute respiratory failure following trauma and surgery ([facility] [address] Adiposity, Arthralgia of multiple joints, Arthritis, Asthma, Asthma with acute exacerbation, Atopic rhinitis, CAD (coronary artery disease), CHF (congestive heart failure) (HCC), Chronic bilateral low back pain without sciatica, Closed fracture of proximal end of humerus, COPD (chronic obstructive pulmonary disease) ([address] Depression, Diverticulitis, Diverticulosis of large intestine, Diverticulosis of large intestine without hemorrhage, Encounter for mammogram to establish baseline mammogram, Essential hypertension, H/O hysterectomy for benign disease, Heart murmur, History of blood transfusion, Hyperlipidemia, Hypothermia due to anesthesia, Laceration of chest wall, Mitral incompetence, Morbid obesity ([address] Non-rheumatic mitral regurgitation, Polyp of sigmoid colon, Postmenopausal status, Routine general medical examination at a health care facility, Special screening for malignant neoplasms, colon, Special screening for malignant neoplasms, vagina, and Urinary incontinence. has a past surgical history that includes Appendectomy; Mitral valve repair; Hysterectomy; bronchoscopy; Cardiac catheterization (03/22/2017); Cardiac catheterization (11/18*/2016); Wound Exploration; other surgical history; Ovary removal; eye surgery (Bilateral); Colonoscopy; Endoscopy, colon, diagnostic; fracture surgery; and Small intestine surgery (N/A, 2/25/2021). Restrictions  Restrictions/Precautions  Restrictions/Precautions: General Precautions, Surgical Protocols, Fall Risk, Up as Tolerated  Required Braces or Orthoses?: No  Position Activity Restriction  Other position/activity restrictions: 2L O2, LUE IV, incision on stomach region with dressing, colostomy, garrido cath, ambulate pt, telemetry  Subjective   General  Chart Reviewed: Yes  Patient assessed for rehabilitation services?: Yes  Response to previous treatment: Patient with no complaints from previous session  Family / Caregiver Present: No      Orientation  Orientation  Overall Orientation Status: Within Normal Limits  Objective    ADL  Grooming: Setup;Stand by assistance(standing at sink for oral care, washing face and to comb hair)  Additional Comments: Pt was already bathed by PCT this morning. Balance  Sitting Balance: Supervision  Standing Balance: Stand by assistance  Standing Balance  Time: 6-8 min  Activity: grooming at sink  Comment: Assistance/awareness with lines to increase safety  Functional Mobility  Functional - Mobility Device: Rolling Walker  Activity: To/from bathroom  Assist Level: Stand by assistance  Functional Mobility Comments: Min cues for safety, writer managed lines. Bed mobility  Supine to Sit: Minimal assistance  Scooting: Minimal assistance  Comment: Min cues for safety/technique. Extra time to complete.   Transfers  Stand Step Transfers: Stand by assistance  Sit to stand: Stand by assistance  Stand to sit: Stand by assistance  Transfer Comments: MIN cues needed for upright posture, scanning, pursed lip breathing and awareness/assist with all lines to increase overall safety. Pt with good hand placement. Cognition  Overall Cognitive Status: WFL     Perception  Overall Perceptual Status: WFL              Type of ROM/Therapeutic Exercise  Type of ROM/Therapeutic Exercise: Resistive Bands  Comment: Pt issued B UE HEP handout with resistance band. Writer provided verbal/visual demo of each ex and pt returned fair demo. Pt completed 15 reps of each ex with very freq rest breaks req'd. Writer educated pt on how to access HEP on smart phone. Plan   Plan  Times per week: 4-5x per week, 1-2x per day as cayetano  Times per day: Daily  Current Treatment Recommendations: Strengthening, Balance Training, Safety Education & Training, Self-Care / ADL, Equipment Evaluation, Education, & procurement, Endurance Training, Patient/Caregiver Education & Training, Home Management Training, Functional Mobility Training, Pain Management             Goals  Short term goals  Time Frame for Short term goals: by discharge, pt to demo  Short term goal 1: S/MI for bed mob tasks with use of bed rail as needed  Short term goal 2: S/MI for UB ADLs and SBA for LB ADLs with AE as needed and Good safety  Short term goal 3: S/MI for ADL transfers and functional mob with least restrictive mob device and Good safety  Short term goal 4: Pt/family to be IND with EC/WS, precautions, and fall prevention tech as well as DME/AE recommendations with use of handouts  Patient Goals   Patient goals : Pt states she wants to go home however is agreeable to rehab if needed.        Therapy Time   Individual Concurrent Group Co-treatment   Time In 4100         Time Out 1052         Minutes [facility] & CORNELIO Hernandez

## 2021-03-03 NOTE — PROGRESS NOTES
Colorectal Surgery Progress Note    Date: 3/3/2021; 6:33 AM    Cc: intermittent nausea     Subjective:   Patient seen and chart reviewed. No acute events overnight. No n/v with sips and ice ships. +flatus. Was up oob to chair and ambulating in room. Worked with PT/OT. Pain controlled. Afebrile. Objective:    Vitals:    03/03/21 0407   BP: (!) 120/51   Pulse: 70   Resp: 18   Temp: 98.6 °F (37 °C)   SpO2: 94%        Gen: alert, awake, NAD  HEENT: moist mucous membranes, no scleral icterus  CV: s1+s2  Lung: equal and symmetric chest rise/fall, non-labored   Abdomen: soft, nd, attp. Incision c/d/i w/ staples. Slight erythema inferior aspect. Wound probed, serous fluid released. Packing applied to inferior aspect. (no sign of infection)  Extremities: no cyanosis or clubbing    I/O last 3 completed shifts: In: 0623 [I.V.:1065]  Out: 1520 [Urine:1500; Stool:20]    CBC:   Lab Results   Component Value Date    WBC 10.7 03/03/2021    RBC 2.95 03/03/2021    HGB 8.8 03/03/2021    HCT 29.4 03/03/2021    MCV 99.7 03/03/2021    MCH 29.8 03/03/2021    MCHC 29.9 03/03/2021    RDW 14.2 03/03/2021     03/03/2021    MPV 9.6 03/03/2021     BMP:    Lab Results   Component Value Date     03/03/2021    K 3.9 03/03/2021     03/03/2021    CO2 29 03/03/2021    BUN 14 03/03/2021    CREATININE 1.19 03/03/2021    CALCIUM 8.2 03/03/2021    GFRAA 53 03/03/2021    LABGLOM 44 03/03/2021    GLUCOSE 115 03/03/2021       Assessment/Plan:     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 Path:   Surgical Pathology Report 02/25/2021  2:08 PM [place]   -- Diagnosis --   SIGMOID COLON AND UPPER RECTUM, SEGMENTAL RESECTION:             -  PERFORATED DIVERTICULITIS WITH SEROSAL ADHESIONS AND   ADJACENT MURAL ABSCESS.        -  MARGINS APPEAR VIABLE.        -  BENIGN REACTIVE MESENTERIC LYMPH NODES. Maria L Stephens M.D.   **Electronically Signed Out**         jet/2/26/2021          · Start cld. And continue to monitor or ostomy output. · Minimize narcotics as able  · Encourage PT, ambulation, increase mobility. · Ornelas catheter to stay in place for 2 weeks post op due to fistula. · IVF - saline lock once tolerating adequate PO intake  · Avoid nephrotoxic agents. Monitor BMP, UoP. · Continue oral home meds  · Medical mgmt per assistance of hospitalist/Pulm CC. · Wound care: - packing to inferior aspect of incision daily. Ok to shower (remove packing prior to shower and repack after)  · Pulmonary toilet/IS  · Wound ostomy to eval  · DVT ppx - heparin    Cathyann Faster Dr. Dorian Newberry saw and examined the patient. I have edited the above and agree with the above. Nehal Ricks  Colorectal Surgery

## 2021-03-03 NOTE — PLAN OF CARE
Problem: Pain:  Goal: Pain level will decrease  Description: Pain level will decrease  Outcome: Ongoing     Problem: Pain:  Goal: Control of acute pain  Description: Control of acute pain  Outcome: Ongoing     Problem: Pain:  Goal: Control of chronic pain  Description: Control of chronic pain  Outcome: Ongoing     Problem: Falls - Risk of:  Goal: Will remain free from falls  Description: Will remain free from falls  Outcome: Ongoing  Note: Siderails up x 2  Hourly rounding. Call light in reach. Instructed to call for assist before attempting out of bed. Remains free from falls and accidental injury at this time. Floor free from obstacles, and bed is locked and in lowest position. Adequate lighting provided. Bed alarm on. Fall sticker on wristband.  Fall Sign posted in doorway      Problem: Falls - Risk of:  Goal: Absence of physical injury  Description: Absence of physical injury  Outcome: Ongoing

## 2021-03-03 NOTE — PLAN OF CARE
Problem: Pain:  Goal: Pain level will decrease  Description: Pain level will decrease  3/3/2021 1247 by Armand Shone, RN  Outcome: Ongoing  3/3/2021 1212 by Armand Shone, RN  Outcome: Ongoing     Problem: Pain:  Goal: Control of acute pain  Description: Control of acute pain  3/3/2021 1247 by Armand Shone, RN  Outcome: Ongoing  3/3/2021 1212 by Armand Shone, RN  Outcome: Ongoing     Problem: Pain:  Goal: Control of chronic pain  Description: Control of chronic pain  3/3/2021 1247 by Armand Shone, RN  Outcome: Ongoing  3/3/2021 1212 by Armand Shone, RN  Outcome: Ongoing     Problem: Falls - Risk of:  Goal: Will remain free from falls  Description: Will remain free from falls  3/3/2021 1247 by Armand Shone, RN  Outcome: Ongoing  3/3/2021 1212 by Armand Shone, RN  Outcome: Ongoing  Note: Siderails up x 2  Hourly rounding. Call light in reach. Instructed to call for assist before attempting out of bed. Remains free from falls and accidental injury at this time. Floor free from obstacles, and bed is locked and in lowest position. Adequate lighting provided. Bed alarm on. Fall sticker on wristband. Fall Sign posted in doorway      Problem: Falls - Risk of:  Goal: Absence of physical injury  Description: Absence of physical injury  3/3/2021 1247 by Armand Shone, RN  Outcome: Ongoing  3/3/2021 1212 by Armand Shone, RN  Outcome: Ongoing     Problem: Nutrition  Goal: Optimal nutrition therapy  Outcome: Ongoing  Note: Patient advanced to clear liquid diet today. Will continue to monitor.

## 2021-03-04 ENCOUNTER — APPOINTMENT (OUTPATIENT)
Dept: GENERAL RADIOLOGY | Age: 76
DRG: 981 | End: 2021-03-04
Attending: COLON & RECTAL SURGERY
Payer: MEDICARE

## 2021-03-04 LAB
ABSOLUTE EOS #: 0.29 K/UL (ref 0–0.44)
ABSOLUTE IMMATURE GRANULOCYTE: 0.26 K/UL (ref 0–0.3)
ABSOLUTE LYMPH #: 1.21 K/UL (ref 1.1–3.7)
ABSOLUTE MONO #: 1.16 K/UL (ref 0.1–1.2)
BASOPHILS # BLD: 0 % (ref 0–2)
BASOPHILS ABSOLUTE: 0.04 K/UL (ref 0–0.2)
DIFFERENTIAL TYPE: ABNORMAL
EOSINOPHILS RELATIVE PERCENT: 3 % (ref 1–4)
HCT VFR BLD CALC: 29.7 % (ref 36.3–47.1)
HEMOGLOBIN: 8.9 G/DL (ref 11.9–15.1)
IMMATURE GRANULOCYTES: 2 %
LYMPHOCYTES # BLD: 11 % (ref 24–43)
MCH RBC QN AUTO: 29.7 PG (ref 25.2–33.5)
MCHC RBC AUTO-ENTMCNC: 30 G/DL (ref 28.4–34.8)
MCV RBC AUTO: 99 FL (ref 82.6–102.9)
MONOCYTES # BLD: 11 % (ref 3–12)
NRBC AUTOMATED: 0 PER 100 WBC
PDW BLD-RTO: 14.4 % (ref 11.8–14.4)
PLATELET # BLD: 280 K/UL (ref 138–453)
PLATELET ESTIMATE: ABNORMAL
PMV BLD AUTO: 10.6 FL (ref 8.1–13.5)
RBC # BLD: 3 M/UL (ref 3.95–5.11)
RBC # BLD: ABNORMAL 10*6/UL
SEG NEUTROPHILS: 73 % (ref 36–65)
SEGMENTED NEUTROPHILS ABSOLUTE COUNT: 7.88 K/UL (ref 1.5–8.1)
WBC # BLD: 10.8 K/UL (ref 3.5–11.3)
WBC # BLD: ABNORMAL 10*3/UL

## 2021-03-04 PROCEDURE — 97530 THERAPEUTIC ACTIVITIES: CPT

## 2021-03-04 PROCEDURE — 6360000002 HC RX W HCPCS: Performed by: STUDENT IN AN ORGANIZED HEALTH CARE EDUCATION/TRAINING PROGRAM

## 2021-03-04 PROCEDURE — 6360000002 HC RX W HCPCS: Performed by: INTERNAL MEDICINE

## 2021-03-04 PROCEDURE — 99212 OFFICE O/P EST SF 10 MIN: CPT

## 2021-03-04 PROCEDURE — 71045 X-RAY EXAM CHEST 1 VIEW: CPT

## 2021-03-04 PROCEDURE — 85025 COMPLETE CBC W/AUTO DIFF WBC: CPT

## 2021-03-04 PROCEDURE — 6370000000 HC RX 637 (ALT 250 FOR IP): Performed by: STUDENT IN AN ORGANIZED HEALTH CARE EDUCATION/TRAINING PROGRAM

## 2021-03-04 PROCEDURE — 2060000000 HC ICU INTERMEDIATE R&B

## 2021-03-04 PROCEDURE — 97535 SELF CARE MNGMENT TRAINING: CPT

## 2021-03-04 PROCEDURE — 94761 N-INVAS EAR/PLS OXIMETRY MLT: CPT

## 2021-03-04 PROCEDURE — 6370000000 HC RX 637 (ALT 250 FOR IP): Performed by: INTERNAL MEDICINE

## 2021-03-04 PROCEDURE — 94640 AIRWAY INHALATION TREATMENT: CPT

## 2021-03-04 PROCEDURE — 36415 COLL VENOUS BLD VENIPUNCTURE: CPT

## 2021-03-04 PROCEDURE — 2700000000 HC OXYGEN THERAPY PER DAY

## 2021-03-04 PROCEDURE — C9113 INJ PANTOPRAZOLE SODIUM, VIA: HCPCS | Performed by: INTERNAL MEDICINE

## 2021-03-04 PROCEDURE — 97116 GAIT TRAINING THERAPY: CPT

## 2021-03-04 PROCEDURE — 2580000003 HC RX 258: Performed by: STUDENT IN AN ORGANIZED HEALTH CARE EDUCATION/TRAINING PROGRAM

## 2021-03-04 PROCEDURE — 97110 THERAPEUTIC EXERCISES: CPT

## 2021-03-04 RX ADMIN — METOPROLOL TARTRATE 25 MG: 25 TABLET, FILM COATED ORAL at 09:45

## 2021-03-04 RX ADMIN — METOPROLOL TARTRATE 25 MG: 25 TABLET, FILM COATED ORAL at 21:10

## 2021-03-04 RX ADMIN — HEPARIN SODIUM 5000 UNITS: 5000 INJECTION INTRAVENOUS; SUBCUTANEOUS at 05:50

## 2021-03-04 RX ADMIN — ACETAMINOPHEN 650 MG: 325 TABLET ORAL at 12:04

## 2021-03-04 RX ADMIN — ATORVASTATIN CALCIUM 20 MG: 20 TABLET, FILM COATED ORAL at 09:49

## 2021-03-04 RX ADMIN — HYDROCODONE BITARTRATE AND ACETAMINOPHEN 1 TABLET: 5; 325 TABLET ORAL at 21:10

## 2021-03-04 RX ADMIN — SODIUM CHLORIDE, PRESERVATIVE FREE 10 ML: 5 INJECTION INTRAVENOUS at 09:45

## 2021-03-04 RX ADMIN — ANTACID TABLETS 500 MG: 500 TABLET, CHEWABLE ORAL at 11:10

## 2021-03-04 RX ADMIN — SUCRALFATE 1 G: 1 TABLET ORAL at 05:49

## 2021-03-04 RX ADMIN — SUCRALFATE 1 G: 1 TABLET ORAL at 09:45

## 2021-03-04 RX ADMIN — BUDESONIDE AND FORMOTEROL FUMARATE DIHYDRATE 2 PUFF: 160; 4.5 AEROSOL RESPIRATORY (INHALATION) at 20:28

## 2021-03-04 RX ADMIN — TORSEMIDE 20 MG: 20 TABLET ORAL at 09:45

## 2021-03-04 RX ADMIN — CITALOPRAM HYDROBROMIDE 10 MG: 10 TABLET ORAL at 09:45

## 2021-03-04 RX ADMIN — BUPROPION HYDROCHLORIDE 150 MG: 150 TABLET, EXTENDED RELEASE ORAL at 09:45

## 2021-03-04 RX ADMIN — HYDROCODONE BITARTRATE AND ACETAMINOPHEN 1 TABLET: 5; 325 TABLET ORAL at 07:14

## 2021-03-04 RX ADMIN — SPIRONOLACTONE 25 MG: 25 TABLET ORAL at 09:45

## 2021-03-04 RX ADMIN — PANTOPRAZOLE SODIUM 40 MG: 40 INJECTION, POWDER, FOR SOLUTION INTRAVENOUS at 09:45

## 2021-03-04 RX ADMIN — HEPARIN SODIUM 5000 UNITS: 5000 INJECTION INTRAVENOUS; SUBCUTANEOUS at 21:10

## 2021-03-04 RX ADMIN — GUAIFENESIN 600 MG: 600 TABLET, EXTENDED RELEASE ORAL at 21:10

## 2021-03-04 RX ADMIN — HEPARIN SODIUM 5000 UNITS: 5000 INJECTION INTRAVENOUS; SUBCUTANEOUS at 14:03

## 2021-03-04 RX ADMIN — HYDROCODONE BITARTRATE AND ACETAMINOPHEN 1 TABLET: 5; 325 TABLET ORAL at 14:03

## 2021-03-04 RX ADMIN — SUCRALFATE 1 G: 1 TABLET ORAL at 17:13

## 2021-03-04 RX ADMIN — BUDESONIDE AND FORMOTEROL FUMARATE DIHYDRATE 2 PUFF: 160; 4.5 AEROSOL RESPIRATORY (INHALATION) at 07:49

## 2021-03-04 RX ADMIN — FLUTICASONE PROPIONATE 1 SPRAY: 50 SPRAY, METERED NASAL at 09:45

## 2021-03-04 RX ADMIN — GUAIFENESIN 600 MG: 600 TABLET, EXTENDED RELEASE ORAL at 09:45

## 2021-03-04 ASSESSMENT — PAIN DESCRIPTION - FREQUENCY
FREQUENCY: INTERMITTENT

## 2021-03-04 ASSESSMENT — PAIN DESCRIPTION - PAIN TYPE
TYPE: SURGICAL PAIN

## 2021-03-04 ASSESSMENT — PAIN DESCRIPTION - PROGRESSION
CLINICAL_PROGRESSION: GRADUALLY WORSENING
CLINICAL_PROGRESSION: NOT CHANGED
CLINICAL_PROGRESSION: GRADUALLY WORSENING
CLINICAL_PROGRESSION: GRADUALLY WORSENING
CLINICAL_PROGRESSION: GRADUALLY IMPROVING
CLINICAL_PROGRESSION: NOT CHANGED
CLINICAL_PROGRESSION: GRADUALLY WORSENING

## 2021-03-04 ASSESSMENT — PAIN DESCRIPTION - ONSET
ONSET: SUDDEN
ONSET: PROGRESSIVE
ONSET: GRADUAL

## 2021-03-04 ASSESSMENT — PAIN SCALES - GENERAL
PAINLEVEL_OUTOF10: 4
PAINLEVEL_OUTOF10: 3
PAINLEVEL_OUTOF10: 4
PAINLEVEL_OUTOF10: 2
PAINLEVEL_OUTOF10: 3
PAINLEVEL_OUTOF10: 5
PAINLEVEL_OUTOF10: 5

## 2021-03-04 ASSESSMENT — PAIN DESCRIPTION - ORIENTATION: ORIENTATION: LOWER

## 2021-03-04 ASSESSMENT — PAIN DESCRIPTION - DESCRIPTORS
DESCRIPTORS: DISCOMFORT;SHARP
DESCRIPTORS: ACHING;DISCOMFORT

## 2021-03-04 ASSESSMENT — PAIN DESCRIPTION - LOCATION
LOCATION: ABDOMEN

## 2021-03-04 NOTE — PROGRESS NOTES
Physical Therapy  Facility/Department: [facility] PROGRESSIVE CARE  Daily Treatment Note  NAME: Elder Lei  : 1945  MRN: [id number]    Date of Service: 3/4/2021    Discharge Recommendations:  Home with assist PRN, Home with Home health PT   PT Equipment Recommendations  Equipment Needed: Yes  Walker: Rolling    Assessment   Body structures, Functions, Activity limitations: Decreased functional mobility ; Decreased endurance;Decreased strength;Decreased balance;Decreased safe awareness; Increased pain  Assessment: Patient with deficits in mobility, endurance, limitied by pain, requires increased time to complete tasks due to decreased tolerance of activities. Patient would benefit from continued skilled PT to promote strength, gait, balance and safety. Prognosis: Excellent  Decision Making: High Complexity  Exam: ROM, MMT, balance, endurance, AM-PAC  Clinical Presentation: unstable  PT Education: Transfer Training;Energy Conservation; Functional Mobility Training;General Safety;Home Exercise Program;Gait Training  Patient Education: Sharita Llamas 53Alaina educated on the importance of continued OOB activities to promote independence in functional mobility and increase tolerance to activity. Patient educated on the importance of VIRGILIO LE exercise to promote mobility and fluid exchange as well as maximize muscle fiber recruitment. REQUIRES PT FOLLOW UP: Yes  Activity Tolerance  Activity Tolerance: Patient unable to increase ambulation distance due to pain and fatigue and retires to recliner upon completion of treatment. Patient with no chair alarm CATHY solorio and approves. Patient Diagnosis(es): There were no encounter diagnoses.      has a past medical history of Acute respiratory failure following trauma and surgery ([facility] [address] Adiposity, Arthralgia of multiple joints, Arthritis, Asthma, Asthma with acute exacerbation, Atopic rhinitis, CAD (coronary artery disease), CHF (congestive heart failure) ([facility] [address] Chronic bilateral low back pain without sciatica, Closed fracture of proximal end of humerus, COPD (chronic obstructive pulmonary disease) ([address] Depression, Diverticulitis, Diverticulosis of large intestine, Diverticulosis of large intestine without hemorrhage, Encounter for mammogram to establish baseline mammogram, Essential hypertension, H/O hysterectomy for benign disease, Heart murmur, History of blood transfusion, Hyperlipidemia, Hypothermia due to anesthesia, Laceration of chest wall, Mitral incompetence, Morbid obesity ([address] Non-rheumatic mitral regurgitation, Polyp of sigmoid colon, Postmenopausal status, Routine general medical examination at a health care facility, Special screening for malignant neoplasms, colon, Special screening for malignant neoplasms, vagina, and Urinary incontinence. has a past surgical history that includes Appendectomy; Mitral valve repair; Hysterectomy; bronchoscopy; Cardiac catheterization (03/22/2017); Cardiac catheterization (11/18*/2016); Wound Exploration; other surgical history; Ovary removal; eye surgery (Bilateral); Colonoscopy; Endoscopy, colon, diagnostic; fracture surgery; and Small intestine surgery (N/A, 2/25/2021). Restrictions  Restrictions/Precautions  Restrictions/Precautions: General Precautions, Surgical Protocols, Fall Risk, Up as Tolerated  Required Braces or Orthoses?: No  Position Activity Restriction  Other position/activity restrictions: 2L O2, RUE IV, incision on stomach region with dressing, colostomy, garrido cath, ambulate pt, telemetry  Subjective   General  Chart Reviewed: Yes  Response To Previous Treatment: Not applicable  Family / Caregiver Present: No  Subjective  Subjective: Pt alert and agreeable to PT treatment. General Comment  Comments: CATHY Curry reports pt medically stable for PT treatment.   Pain Screening  Patient Currently in Pain: Denies  Vital Signs  Patient Currently in Pain: Denies       Orientation  Orientation  Overall Orientation Status: Within Functional Limits  Cognition      Objective   Bed mobility  Bridging: Minimal assistance  Rolling to Left: Contact guard assistance  Rolling to Right: Contact guard assistance  Supine to Sit: Minimal assistance  Sit to Supine: Minimal assistance  Scooting: Contact guard assistance  Comment: Patient requires vc's for hand placement and positioing to successfully complete EOB seated positioning. Transfers  Sit to Stand: Contact guard assistance  Stand to sit: Contact guard assistance  Bed to Chair: Stand by assistance  Stand Pivot Transfers: Contact guard assistance  Squat Pivot Transfers: Contact guard assistance  Comment: Patient with good postural alignment, displays increased use of UE for balance and support durning transfers. Patient with good safety awareness. Patient requires increased time to complete tasks due to pain and decreased endurance. Patient states \"pain level is increased today in abdomal region and that she has taken pain meds today. \"  Therapist notified CATHY Curry of pain complaint./  Ambulation  Ambulation?: Yes  More Ambulation?: No  Ambulation 1  Surface: level tile  Device: Rolling Walker  Other Apparatus: O2  Assistance: Contact guard assistance  Quality of Gait: step-to gait pattern, increased time to complete  Gait Deviations: Slow Teena; Increased DAYSI; Decreased step length;Decreased step height  Distance: 60 feet x1  Comments: Patient limited by pain and endurance. Patient requires increased time to complete task. Patient requires seated rest break upon completion of ambulation due to fatigue. Neuromuscular Education  NDT Treatment: Sitting;Standing;Gait   Neuromuscular Comments: patient given education on proper seated postural alignment and patient stands EOB static for 2 minutes to promote balance and proper balance righting strategies to maximize gait activities.   Balance  Posture: Fair  Sitting - Static: Good  Sitting - Dynamic: Fair;-  Standing - Static: Fair  Comments: Patient unable to complete single leg stance due to pain and decreased endurance. Exercises  Comments: Pt performed VIRGILIO LE exercises x 10 reps. Patient seated EOB for 3 minutes to work on proper postural awareness and promote balance and enhance functional activities. Patient requires increased time to complete all activities. Patient performs supine VIRGILIO LE exercises 1 set x 10 rep to promote sucdessful bed mobility and maximize independence in ADL's     AM-PAC Score  AM-PAC Inpatient Mobility Raw Score : 15 (03/04/21 1120)  AM-PAC Inpatient T-Scale Score : 39.45 (03/04/21 1120)  Mobility Inpatient CMS 0-100% Score: 57.7 (03/04/21 1120)  Mobility Inpatient CMS G-Code Modifier : CK (03/04/21 1120)          Goals  Short term goals  Time Frame for Short term goals: 12 visits  Short term goal 1: Pt to be indep with all bed mobility, demo'ing log rolling  Short term goal 2: Pt to be indep with all functional transfers  Short term goal 3: Pt will ambulate 200ft with RW/LRAD and SBA  Short term goal 4: Pt will tolerate 25mins of PT including therex, theract, gait training and neuro re-ed to improve functional mobiilty and activity tolerance. Patient Goals   Patient goals : To feel better    Plan    Plan  Times per week: 1-2x day / 5-6 days per week  Current Treatment Recommendations: Strengthening, Transfer Training, Endurance Training, Neuromuscular Re-education, Patient/Caregiver Education & Training, Balance Training, Gait Training, Home Exercise Program, Functional Mobility Training, Safety Education & Training, Positioning  Safety Devices  Type of devices:  All fall risk precautions in place, Call light within reach, Gait belt, Left in chair     Therapy Time   Individual Concurrent Group Co-treatment   Time In 0956         Time Out 1034         Minutes [id number] [place], [place]

## 2021-03-04 NOTE — PROGRESS NOTES
Subjective:     Follow-up CHF  Denies any chest pain no palpitation no shortness of breath no wheezing no tachypnea occasional cough  ROS  Fever no chills no GI/ complaints except abdominal pain incision site no cardiopulmonary complaints at present no TIA no bleeding no headache no sore throat no skin lesions no polyuria polydipsia or  physical exam  General Appearance: alert and oriented to person, place and time and in no acute distress  Skin: warm and dry, no rash or erythema  Head: normocephalic and atraumatic  Eyes: pupils equal, round, and reactive to light, conjunctivae normal and sclera anicteric  Neck: neck supple and non tender without mass   Pulmonary/Chest: Air entry equal clear to auscultation decreased at the bases no use of intercostal  Cardiovascular: normal rate, regular rhythm, normal S1 and S2, no gallops, intact distal pulses and no carotid bruits  Abdomen: soft, non-tender, non-distended, normal bowel sounds, no masses or organomegaly  Extremities: Trace edema and pulses Homans  Neurologic: Alert oriented x3 with no focal deficit    BP (!) 136/56   Pulse 69   Temp 98 °F (36.7 °C) (Oral)   Resp 18   Ht 5' 2\" (1.575 m)   Wt (!) 314 lb 11.2 oz (142.7 kg)   SpO2 98%   BMI 57.56 kg/m²     CBC:   Lab Results   Component Value Date    WBC 10.8 03/04/2021    RBC 3.00 03/04/2021    HGB 8.9 03/04/2021    HCT 29.7 03/04/2021    MCV 99.0 03/04/2021    MCH 29.7 03/04/2021    MCHC 30.0 03/04/2021    RDW 14.4 03/04/2021     03/04/2021    MPV 10.6 03/04/2021     BMP:    Lab Results   Component Value Date     03/03/2021    K 3.9 03/03/2021     03/03/2021    CO2 29 03/03/2021    BUN 14 03/03/2021    CREATININE 1.19 03/03/2021    CALCIUM 8.2 03/03/2021    GFRAA 53 03/03/2021    LABGLOM 44 03/03/2021    GLUCOSE 115 03/03/2021        Assessment:  Patient Active Problem List   Diagnosis    Acute respiratory failure following trauma and surgery (HCC)    Adiposity    Arthralgia of

## 2021-03-04 NOTE — PROGRESS NOTES
Pulmonary Critical Care Progress Note  Katelyn Melendez CNP / Dr. Carmen Williamson M.D. Patient seen for the follow up of respiratory insufficiency, CHF, asthma, JAYDEN    Subjective:  She is sitting up at the side of the bed working with physical therapy. She has mild increase in her shortness of breath, feels secondary to her increased pain today. No complaints of a cough. No events noted overnight. Length of stay: 7 Days    Examination:    Vitals: BP (!) 130/47   Pulse 72   Temp 98.2 °F (36.8 °C) (Oral)   Resp 16   Ht 5' 2\" (1.575 m)   Wt (!) 314 lb 11.2 oz (142.7 kg)   SpO2 96%   BMI 57.56 kg/m²     General appearance: alert and cooperative with exam, up with PT  Neck: No JVD  Lungs: moderate air exchange with no wheeze  Heart: regular rate and rhythm, S1, S2 normal, no gallop  Abdomen: Soft, non tender, + BS  Extremities: no cyanosis or clubbing. No significant edema    LABs:  CBC:   Recent Labs     03/03/21  0511 03/04/21  0605   WBC 10.7 10.8   HGB 8.8* 8.9*   HCT 29.4* 29.7*    280     BMP:   Recent Labs     03/02/21  0449 03/03/21  0511    139   K 3.9 3.9   CO2 30 29   BUN 15 14   CREATININE 1.26* 1.19*   LABGLOM 41* 44*   GLUCOSE 116* 115*       Radiology:      Impression   1. Enlarged cardiac silhouette with prominence of the pulmonary vasculature. 2. Minimal bibasilar opacification, which is unchanged. 3. No large pleural effusions.        Impression:  · Acute on chronic hypoxic respiratory insufficiency, on nocturnal oxygen  · Mild pulmonary edema  · Acute on chronic diastolic HF  · History of asthma  · Obstructive sleep apnea/morbid obesity, on CPAP therapy  · Status post exploratory laparotomy, sigmoid/superior rectum resection and creation of end colostomy and lysis of adhesions with mobilization of splenic flexure 02/25/2021  · SHARYN on CKD  · CAD, HLD, HTN MVR    Recommendations:  · Oxygen by nasal cannula  · Home O2 Evaluation prior to discharge  · Albuterol and Ipratropium Q 4 hours and prn  · Symbicort 160  · Demadex and Aldactone  · Amiodarone   · IVF  · Pain control   · Surgery following, clear liquid diet  · Wound care/Ostomy care   · PT/OT  · Labs: CBC and BMP in am; monitor renal function   · DVT prophylaxis with low molecular weight heparin  · Incentive spirometry every hour while awake  · Will follow with you    Electronically signed by     ALEX Martínez CNP on 3/4/2021 at 10:04 AM  Patient was seen under the supervision of Dr. Ysabel Gutierrez  Above assessment and plan will be reviewed with Dr. Abdi Bush.   Patient plan will bee finalized following review by Dr. Yandy Chavez and Sleep Medicine,    [facility] AT [facility] WOR[facility]: [phone]

## 2021-03-04 NOTE — PROGRESS NOTES
Occupational Therapy  Facility/Department: [facility]  Daily Treatment Note  NAME: Daryle Haymaker  : 1945  MRN: [id number]    Date of Service: 3/4/2021    Discharge Recommendations:  Subacute/Skilled Nursing Facility       Assessment   Performance deficits / Impairments: Decreased functional mobility ; Decreased ADL status; Decreased safe awareness;Decreased endurance;Decreased balance;Decreased strength;Decreased sensation;Decreased high-level IADLs  Assessment: Continue with OT and focus on teach and train of AE use to increase overall I and ease with LB ADL's. Prognosis: Good  REQUIRES OT FOLLOW UP: Yes  Activity Tolerance  Activity Tolerance: Patient limited by fatigue;Patient limited by pain  Activity Tolerance: Fair  Safety Devices  Safety Devices in place: Yes  Type of devices: Left in bed;Bed alarm in place;Call light within reach;Nurse notified         Patient Diagnosis(es): There were no encounter diagnoses.       has a past medical history of Acute respiratory failure following trauma and surgery ([facility] [address] Adiposity, Arthralgia of multiple joints, Arthritis, Asthma, Asthma with acute exacerbation, Atopic rhinitis, CAD (coronary artery disease), CHF (congestive heart failure) (HCC), Chronic bilateral low back pain without sciatica, Closed fracture of proximal end of humerus, COPD (chronic obstructive pulmonary disease) ([address] Depression, Diverticulitis, Diverticulosis of large intestine, Diverticulosis of large intestine without hemorrhage, Encounter for mammogram to establish baseline mammogram, Essential hypertension, H/O hysterectomy for benign disease, Heart murmur, History of blood transfusion, Hyperlipidemia, Hypothermia due to anesthesia, Laceration of chest wall, Mitral incompetence, Morbid obesity ([address] Non-rheumatic mitral regurgitation, Polyp of sigmoid colon, Postmenopausal status, Routine general medical examination at a health care facility, Special screening for malignant neoplasms, colon, Special screening for malignant neoplasms, vagina, and Urinary incontinence. has a past surgical history that includes Appendectomy; Mitral valve repair; Hysterectomy; bronchoscopy; Cardiac catheterization (03/22/2017); Cardiac catheterization (11/18*/2016); Wound Exploration; other surgical history; Ovary removal; eye surgery (Bilateral); Colonoscopy; Endoscopy, colon, diagnostic; fracture surgery; and Small intestine surgery (N/A, 2/25/2021). Restrictions  Restrictions/Precautions  Restrictions/Precautions: General Precautions, Surgical Protocols, Fall Risk, Up as Tolerated  Required Braces or Orthoses?: No  Position Activity Restriction  Other position/activity restrictions: 2L O2, RUE IV, incision on stomach region with dressing, colostomy, garrido cath, ambulate pt, telemetry  Subjective   General  Chart Reviewed: Yes  Patient assessed for rehabilitation services?: Yes  Response to previous treatment: Patient with no complaints from previous session  Family / Caregiver Present: No      Orientation  Orientation  Overall Orientation Status: Within Normal Limits  Objective    ADL  UE Bathing: Setup;Minimal assistance  LE Bathing: Setup;Maximum assistance  UE Dressing: Setup;Minimal assistance  LE Dressing: Setup;Maximum assistance  Additional Comments: Pt showered this date using RODRIGUEZ HEX 4 surgical soap. Balance  Sitting Balance: Independent  Standing Balance: Stand by assistance  Functional Mobility  Functional - Mobility Device: Rolling Walker  Activity: To/from bathroom  Assist Level: Stand by assistance  Functional Mobility Comments: Min cues for safety, writer managed lines. Shower Transfers  Shower - Transfer From: Conchis Gastelum - Transfer Type: To and From  Shower - Transfer To: Transfer tub bench  Shower - Technique: Ambulating  Shower Transfers: Contact Guard  Shower Transfers Comments: Verbal cues for safety/technique, writer managed lines.   Bed mobility  Sit to Supine: Minimal assistance(to lift legs into bed)  Transfers  Stand Step Transfers: Stand by assistance  Sit to stand: Stand by assistance  Stand to sit: Stand by assistance  Transfer Comments: MIN cues needed for upright posture, scanning, pursed lip breathing and awareness/assist with all lines to increase overall safety. Pt with good hand placement. Cognition  Overall Cognitive Status: WFL     Perception  Overall Perceptual Status: WFL                                   Plan   Plan  Times per week: 4-5x per week, 1-2x per day as cayetano  Times per day: Daily  Current Treatment Recommendations: Strengthening, Balance Training, Safety Education & Training, Self-Care / ADL, Equipment Evaluation, Education, & procurement, Endurance Training, Patient/Caregiver Education & Training, Home Management Training, Functional Mobility Training, Pain Management             Goals  Short term goals  Time Frame for Short term goals: by discharge, pt to demo  Short term goal 1: S/MI for bed mob tasks with use of bed rail as needed  Short term goal 2: S/MI for UB ADLs and SBA for LB ADLs with AE as needed and Good safety  Short term goal 3: S/MI for ADL transfers and functional mob with least restrictive mob device and Good safety  Short term goal 4: Pt/family to be IND with EC/WS, precautions, and fall prevention tech as well as DME/AE recommendations with use of handouts  Patient Goals   Patient goals : Pt states she wants to go home however is agreeable to rehab if needed.        Therapy Time   Individual Concurrent Group Co-treatment   Time In 1211         Time Out 1255         Minutes 1 [facility] [address], [place]

## 2021-03-04 NOTE — CARE COORDINATION
Social Work-Phone call to [facility]. They state that they are unsure if they will have a bed by the weekend. She will call  in the morning regarding bed availability. Discussed with patient. She will review list for other choices.  Hardy Hall

## 2021-03-04 NOTE — PROGRESS NOTES
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 · Continue CLD  · Monitor or ostomy output. · Minimize narcotics as able  · Encourage PT, ambulation, increase mobility. · Ornelas catheter to stay in place for 2 weeks post op due to fistula. · IVF - saline lock once tolerating adequate PO intake  · Avoid nephrotoxic agents. Monitor BMP, UoP. · Continue oral home meds  · Medical mgmt per assistance of hospitalist/Pulm CC. · Wound care: - packing to inferior aspect of incision daily. Ok to shower (remove packing prior to shower and repack after)  · Pulmonary toilet/IS  · Wound ostomy to eval  · DVT ppx - heparin    Az Page MD  General Surgery PGY3  Available via [employer]                    I Dr. Livia Weinberg saw and examined the patient. I have edited the above and agree with the above. Nehal Weinberg  Colorectal Surgery  Attending: Jada Nur

## 2021-03-04 NOTE — PLAN OF CARE
Problem: Pain:  Goal: Pain level will decrease  Description: Pain level will decrease  Outcome: Ongoing   Pt having increased pain today. Requests pain meds appropriately . Able to rate pain and aware of past ileus. States she does not want to take a lot of pain meds. Norco and tylenol effective pain control at present. Will continue to monitor pain level with hourly rounding      Problem: Falls - Risk of:  Goal: Will remain free from falls  Description: Will remain free from falls  Outcome: Ongoing    Fall risk assessment done, bed locked in low position, call light in reach at all times and encouraged to use for assist. Pathway to bathroom clutter-free and non skid socks on Pt using walker to aide with ambulation. .Continue with hourly rounding, monitor for change. Pt remains free of falls/injury .  Pt with fair steady gait observed

## 2021-03-04 NOTE — PROGRESS NOTES
[facility] Ost[facility] Continence [facility]  Follow Up      NAME:  Brandon Grace [place] RECORD NUMBER:  [id number]  AGE: 67 y.o. GENDER: female  : 1945  TODAY'S DATE:  3/4/2021        Courtesy visit today for follow up on ostomy care. The patient asked appropriate follow up questions. Reviewed education learned yesterday. She states having a bit more pain today. Support and encouragement extended. Will cont to follow.      Jaqueline WILKESN, RN, [facility] [place] [address]  Wound, Ostomy, and Continence Nursing  [phone]

## 2021-03-05 ENCOUNTER — APPOINTMENT (OUTPATIENT)
Dept: GENERAL RADIOLOGY | Age: 76
DRG: 981 | End: 2021-03-05
Attending: COLON & RECTAL SURGERY
Payer: MEDICARE

## 2021-03-05 ENCOUNTER — APPOINTMENT (OUTPATIENT)
Dept: INTERVENTIONAL RADIOLOGY/VASCULAR | Age: 76
DRG: 981 | End: 2021-03-05
Attending: COLON & RECTAL SURGERY
Payer: MEDICARE

## 2021-03-05 LAB
ABSOLUTE EOS #: 0.34 K/UL (ref 0–0.44)
ABSOLUTE IMMATURE GRANULOCYTE: 0.56 K/UL (ref 0–0.3)
ABSOLUTE LYMPH #: 1.01 K/UL (ref 1.1–3.7)
ABSOLUTE MONO #: 1.23 K/UL (ref 0.1–1.2)
ANION GAP SERPL CALCULATED.3IONS-SCNC: 10 MMOL/L (ref 9–17)
BASOPHILS # BLD: 0 % (ref 0–2)
BASOPHILS ABSOLUTE: 0 K/UL (ref 0–0.2)
BUN BLDV-MCNC: 12 MG/DL (ref 8–23)
BUN/CREAT BLD: 11 (ref 9–20)
CALCIUM SERPL-MCNC: 8.3 MG/DL (ref 8.6–10.4)
CHLORIDE BLD-SCNC: 101 MMOL/L (ref 98–107)
CO2: 27 MMOL/L (ref 20–31)
CREAT SERPL-MCNC: 1.07 MG/DL (ref 0.5–0.9)
DIFFERENTIAL TYPE: ABNORMAL
EOSINOPHILS RELATIVE PERCENT: 3 % (ref 1–4)
GFR AFRICAN AMERICAN: >60 ML/MIN
GFR NON-AFRICAN AMERICAN: 50 ML/MIN
GFR SERPL CREATININE-BSD FRML MDRD: ABNORMAL ML/MIN/{1.73_M2}
GFR SERPL CREATININE-BSD FRML MDRD: ABNORMAL ML/MIN/{1.73_M2}
GLUCOSE BLD-MCNC: 110 MG/DL (ref 70–99)
HCT VFR BLD CALC: 28.9 % (ref 36.3–47.1)
HEMOGLOBIN: 8.7 G/DL (ref 11.9–15.1)
IMMATURE GRANULOCYTES: 5 %
LYMPHOCYTES # BLD: 9 % (ref 24–43)
MAGNESIUM: 1.4 MG/DL (ref 1.6–2.6)
MCH RBC QN AUTO: 29.6 PG (ref 25.2–33.5)
MCHC RBC AUTO-ENTMCNC: 30.1 G/DL (ref 28.4–34.8)
MCV RBC AUTO: 98.3 FL (ref 82.6–102.9)
MONOCYTES # BLD: 11 % (ref 3–12)
NRBC AUTOMATED: 0 PER 100 WBC
PDW BLD-RTO: 14.6 % (ref 11.8–14.4)
PHOSPHORUS: 2.6 MG/DL (ref 2.6–4.5)
PLATELET # BLD: 269 K/UL (ref 138–453)
PLATELET ESTIMATE: ABNORMAL
PMV BLD AUTO: 9.7 FL (ref 8.1–13.5)
POTASSIUM SERPL-SCNC: 4 MMOL/L (ref 3.7–5.3)
RBC # BLD: 2.94 M/UL (ref 3.95–5.11)
RBC # BLD: ABNORMAL 10*6/UL
SEG NEUTROPHILS: 72 % (ref 36–65)
SEGMENTED NEUTROPHILS ABSOLUTE COUNT: 8.06 K/UL (ref 1.5–8.1)
SODIUM BLD-SCNC: 138 MMOL/L (ref 135–144)
TRIGL SERPL-MCNC: 140 MG/DL
WBC # BLD: 11.2 K/UL (ref 3.5–11.3)
WBC # BLD: ABNORMAL 10*3/UL

## 2021-03-05 PROCEDURE — 2060000000 HC ICU INTERMEDIATE R&B

## 2021-03-05 PROCEDURE — 6370000000 HC RX 637 (ALT 250 FOR IP): Performed by: STUDENT IN AN ORGANIZED HEALTH CARE EDUCATION/TRAINING PROGRAM

## 2021-03-05 PROCEDURE — 6360000002 HC RX W HCPCS: Performed by: INTERNAL MEDICINE

## 2021-03-05 PROCEDURE — 2580000003 HC RX 258: Performed by: STUDENT IN AN ORGANIZED HEALTH CARE EDUCATION/TRAINING PROGRAM

## 2021-03-05 PROCEDURE — 83735 ASSAY OF MAGNESIUM: CPT

## 2021-03-05 PROCEDURE — 02HV33Z INSERTION OF INFUSION DEVICE INTO SUPERIOR VENA CAVA, PERCUTANEOUS APPROACH: ICD-10-PCS | Performed by: RADIOLOGY

## 2021-03-05 PROCEDURE — 85025 COMPLETE CBC W/AUTO DIFF WBC: CPT

## 2021-03-05 PROCEDURE — 6360000002 HC RX W HCPCS: Performed by: STUDENT IN AN ORGANIZED HEALTH CARE EDUCATION/TRAINING PROGRAM

## 2021-03-05 PROCEDURE — C9113 INJ PANTOPRAZOLE SODIUM, VIA: HCPCS | Performed by: INTERNAL MEDICINE

## 2021-03-05 PROCEDURE — 84100 ASSAY OF PHOSPHORUS: CPT

## 2021-03-05 PROCEDURE — C1751 CATH, INF, PER/CENT/MIDLINE: HCPCS

## 2021-03-05 PROCEDURE — 94640 AIRWAY INHALATION TREATMENT: CPT

## 2021-03-05 PROCEDURE — 2500000003 HC RX 250 WO HCPCS: Performed by: COLON & RECTAL SURGERY

## 2021-03-05 PROCEDURE — 36573 INSJ PICC RS&I 5 YR+: CPT | Performed by: RADIOLOGY

## 2021-03-05 PROCEDURE — 2700000000 HC OXYGEN THERAPY PER DAY

## 2021-03-05 PROCEDURE — 94761 N-INVAS EAR/PLS OXIMETRY MLT: CPT

## 2021-03-05 PROCEDURE — 80048 BASIC METABOLIC PNL TOTAL CA: CPT

## 2021-03-05 PROCEDURE — 71045 X-RAY EXAM CHEST 1 VIEW: CPT

## 2021-03-05 PROCEDURE — 36415 COLL VENOUS BLD VENIPUNCTURE: CPT

## 2021-03-05 PROCEDURE — 6370000000 HC RX 637 (ALT 250 FOR IP): Performed by: INTERNAL MEDICINE

## 2021-03-05 PROCEDURE — 97110 THERAPEUTIC EXERCISES: CPT

## 2021-03-05 PROCEDURE — 84478 ASSAY OF TRIGLYCERIDES: CPT

## 2021-03-05 PROCEDURE — 74018 RADEX ABDOMEN 1 VIEW: CPT

## 2021-03-05 RX ADMIN — METOPROLOL TARTRATE 25 MG: 25 TABLET, FILM COATED ORAL at 21:45

## 2021-03-05 RX ADMIN — ACETAMINOPHEN 650 MG: 325 TABLET ORAL at 08:13

## 2021-03-05 RX ADMIN — GUAIFENESIN 600 MG: 600 TABLET, EXTENDED RELEASE ORAL at 21:45

## 2021-03-05 RX ADMIN — METOPROLOL TARTRATE 25 MG: 25 TABLET, FILM COATED ORAL at 08:13

## 2021-03-05 RX ADMIN — I.V. FAT EMULSION 100 ML: 20 EMULSION INTRAVENOUS at 17:56

## 2021-03-05 RX ADMIN — SODIUM CHLORIDE, PRESERVATIVE FREE 10 ML: 5 INJECTION INTRAVENOUS at 08:14

## 2021-03-05 RX ADMIN — TORSEMIDE 20 MG: 20 TABLET ORAL at 08:14

## 2021-03-05 RX ADMIN — BUDESONIDE AND FORMOTEROL FUMARATE DIHYDRATE 2 PUFF: 160; 4.5 AEROSOL RESPIRATORY (INHALATION) at 08:15

## 2021-03-05 RX ADMIN — HYDROCODONE BITARTRATE AND ACETAMINOPHEN 1 TABLET: 5; 325 TABLET ORAL at 17:13

## 2021-03-05 RX ADMIN — AMIODARONE HYDROCHLORIDE 100 MG: 200 TABLET ORAL at 08:13

## 2021-03-05 RX ADMIN — BUPROPION HYDROCHLORIDE 150 MG: 150 TABLET, EXTENDED RELEASE ORAL at 08:13

## 2021-03-05 RX ADMIN — SPIRONOLACTONE 25 MG: 25 TABLET ORAL at 08:14

## 2021-03-05 RX ADMIN — BUDESONIDE AND FORMOTEROL FUMARATE DIHYDRATE 2 PUFF: 160; 4.5 AEROSOL RESPIRATORY (INHALATION) at 20:59

## 2021-03-05 RX ADMIN — PANTOPRAZOLE SODIUM 40 MG: 40 INJECTION, POWDER, FOR SOLUTION INTRAVENOUS at 08:13

## 2021-03-05 RX ADMIN — CALCIUM GLUCONATE: 94 INJECTION, SOLUTION INTRAVENOUS at 17:56

## 2021-03-05 RX ADMIN — GUAIFENESIN 600 MG: 600 TABLET, EXTENDED RELEASE ORAL at 08:13

## 2021-03-05 RX ADMIN — HYDROCODONE BITARTRATE AND ACETAMINOPHEN 1 TABLET: 5; 325 TABLET ORAL at 23:24

## 2021-03-05 RX ADMIN — HEPARIN SODIUM 5000 UNITS: 5000 INJECTION INTRAVENOUS; SUBCUTANEOUS at 06:12

## 2021-03-05 RX ADMIN — HEPARIN SODIUM 5000 UNITS: 5000 INJECTION INTRAVENOUS; SUBCUTANEOUS at 21:45

## 2021-03-05 RX ADMIN — SUCRALFATE 1 G: 1 TABLET ORAL at 06:12

## 2021-03-05 RX ADMIN — CITALOPRAM HYDROBROMIDE 10 MG: 10 TABLET ORAL at 08:14

## 2021-03-05 RX ADMIN — FLUTICASONE PROPIONATE 1 SPRAY: 50 SPRAY, METERED NASAL at 08:13

## 2021-03-05 RX ADMIN — ATORVASTATIN CALCIUM 20 MG: 20 TABLET, FILM COATED ORAL at 08:13

## 2021-03-05 RX ADMIN — HEPARIN SODIUM 5000 UNITS: 5000 INJECTION INTRAVENOUS; SUBCUTANEOUS at 14:51

## 2021-03-05 ASSESSMENT — PAIN DESCRIPTION - PROGRESSION
CLINICAL_PROGRESSION: GRADUALLY IMPROVING
CLINICAL_PROGRESSION: NOT CHANGED
CLINICAL_PROGRESSION: GRADUALLY IMPROVING
CLINICAL_PROGRESSION: GRADUALLY WORSENING
CLINICAL_PROGRESSION: GRADUALLY IMPROVING

## 2021-03-05 ASSESSMENT — PAIN DESCRIPTION - LOCATION
LOCATION: ABDOMEN

## 2021-03-05 ASSESSMENT — PAIN DESCRIPTION - ORIENTATION
ORIENTATION: LOWER

## 2021-03-05 ASSESSMENT — PAIN DESCRIPTION - FREQUENCY
FREQUENCY: INTERMITTENT
FREQUENCY: INTERMITTENT
FREQUENCY: CONTINUOUS

## 2021-03-05 ASSESSMENT — PAIN DESCRIPTION - PAIN TYPE
TYPE: ACUTE PAIN
TYPE: SURGICAL PAIN

## 2021-03-05 ASSESSMENT — PAIN SCALES - GENERAL
PAINLEVEL_OUTOF10: 3
PAINLEVEL_OUTOF10: 3
PAINLEVEL_OUTOF10: 1
PAINLEVEL_OUTOF10: 4
PAINLEVEL_OUTOF10: 7
PAINLEVEL_OUTOF10: 3
PAINLEVEL_OUTOF10: 3
PAINLEVEL_OUTOF10: 2
PAINLEVEL_OUTOF10: 3
PAINLEVEL_OUTOF10: 3

## 2021-03-05 ASSESSMENT — PAIN DESCRIPTION - DESCRIPTORS
DESCRIPTORS: ACHING;DISCOMFORT
DESCRIPTORS: ACHING;CRAMPING;DISCOMFORT
DESCRIPTORS: DISCOMFORT
DESCRIPTORS: ACHING;CRAMPING;DISCOMFORT

## 2021-03-05 ASSESSMENT — PAIN DESCRIPTION - ONSET
ONSET: ON-GOING
ONSET: ON-GOING

## 2021-03-05 NOTE — PLAN OF CARE
Problem: Pain:  Goal: Pain level will decrease  Description: Pain level will decrease  Outcome: Ongoing  Goal: Control of acute pain  Description: Control of acute pain  Outcome: Ongoing  Goal: Control of chronic pain  Description: Control of chronic pain  Outcome: Ongoing     Problem: Falls - Risk of:  Goal: Will remain free from falls  Description: Will remain free from falls  Outcome: Ongoing  Goal: Absence of physical injury  Description: Absence of physical injury  Outcome: Ongoing     Problem: Skin Integrity:  Goal: Will show no infection signs and symptoms  Description: Will show no infection signs and symptoms  Outcome: Ongoing  Goal: Absence of new skin breakdown  Description: Absence of new skin breakdown  Outcome: Ongoing     Problem: Discharge Planning:  Goal: Discharged to appropriate level of care  Description: Discharged to appropriate level of care  Outcome: Ongoing     Problem: Body Image - Altered:  Goal: Expressions of positive opinion of body image will increase  Description: Expressions of positive opinion of body image will increase  Outcome: Ongoing     Problem:  Bowel Function - Altered:  Goal: Bowel elimination is within specified parameters  Description: Bowel elimination is within specified parameters  Outcome: Ongoing     Problem: Fluid Volume - Imbalance:  Goal: Absence of imbalanced fluid volume signs and symptoms  Description: Absence of imbalanced fluid volume signs and symptoms  Outcome: Ongoing     Problem: Infection - Surgical Site:  Goal: Will show no infection signs and symptoms  Description: Will show no infection signs and symptoms  Outcome: Ongoing  Goal: Demonstration of wound healing without infection will improve  Description: Demonstration of wound healing without infection will improve  Outcome: Ongoing     Problem: Pain:  Goal: Pain level will decrease  Description: Pain level will decrease  Outcome: Ongoing  Goal: Control of acute pain  Description: Control of acute

## 2021-03-05 NOTE — PROGRESS NOTES
evaluation prior to discharge albuterol and Ipratropium Q 4 hours and prn  · Symbicort 160  · Demadex and Aldactone  · Amiodarone   · IVF. Would decrease IV fluids to KVO once TPN is started  · Pain control   · NPO/NG tube management. /TPN as per general surgery  · Wound care/Ostomy care   · PT/OT  · Labs: CBC and BMP in am; monitor renal function   · DVT prophylaxis with low molecular weight heparin  · Incentive spirometry every hour while awake  · Will follow with you    Electronically signed by     Delia Neri MD on 3/5/2021 at 2:10 PM  Pulmonary Critical Care and Sleep Medicine,  [facility]  Cell: [phone]  Office: [phone]

## 2021-03-05 NOTE — PROGRESS NOTES
Physical Therapy  Facility/Department: [facility] PROGRESSIVE CARE  Daily Treatment Note  NAME: Augusta Moreira  : 1945  MRN: [id number]    Date of Service: 3/5/2021    Discharge Recommendations:  Home with assist PRN, Home with Home health PT   PT Equipment Recommendations  Equipment Needed: Yes  Mobility Devices: Merlynn [place]: Rolling    Assessment   Body structures, Functions, Activity limitations: Decreased functional mobility ; Decreased endurance;Decreased strength;Decreased balance;Decreased safe awareness; Increased pain  Assessment: Patient with deficits in mobility, endurance, limitied by pain, requires increased time to complete tasks due to decreased tolerance of activities. Patient would benefit from continued skilled PT to promote strength, gait, balance and safety. Prognosis: Excellent  Activity Tolerance  Activity Tolerance: Patient limited by fatigue;Patient limited by endurance     Patient Diagnosis(es): There were no encounter diagnoses.      has a past medical history of Acute respiratory failure following trauma and surgery ([facility] [address] Adiposity, Arthralgia of multiple joints, Arthritis, Asthma, Asthma with acute exacerbation, Atopic rhinitis, CAD (coronary artery disease), CHF (congestive heart failure) ([facility]), Chronic bilateral low back pain without sciatica, Closed fracture of proximal end of humerus, COPD (chronic obstructive pulmonary disease) ([address] Depression, Diverticulitis, Diverticulosis of large intestine, Diverticulosis of large intestine without hemorrhage, Encounter for mammogram to establish baseline mammogram, Essential hypertension, H/O hysterectomy for benign disease, Heart murmur, History of blood transfusion, Hyperlipidemia, Hypothermia due to anesthesia, Laceration of chest wall, Mitral incompetence, Morbid obesity ([address] Non-rheumatic mitral regurgitation, Polyp of sigmoid colon, Postmenopausal status, Routine general medical examination at a health care facility, Special screening for malignant neoplasms, colon, Special screening for malignant neoplasms, vagina, and Urinary incontinence. has a past surgical history that includes Appendectomy; Mitral valve repair; Hysterectomy; bronchoscopy; Cardiac catheterization (03/22/2017); Cardiac catheterization (11/18*/2016); Wound Exploration; other surgical history; Ovary removal; eye surgery (Bilateral); Colonoscopy; Endoscopy, colon, diagnostic; fracture surgery; and Small intestine surgery (N/A, 2/25/2021). Restrictions  Restrictions/Precautions  Restrictions/Precautions: General Precautions, Surgical Protocols, Fall Risk, Up as Tolerated  Required Braces or Orthoses?: No  Position Activity Restriction  Other position/activity restrictions: 2L O2, RUE IV, incision on stomach region with dressing, colostomy, garrido cath, ambulate pt, telemetry  Subjective   General  Chart Reviewed: Yes  Response To Previous Treatment: Patient with no complaints from previous session. Family / Caregiver Present: No  Subjective  Subjective: Pt alert and agreeable to PT treatment. General Comment  Comments: CATHY Curry reports pt medically stable for PT treatment. Pain Screening  Patient Currently in Pain: Yes  Vital Signs  Patient Currently in Pain: Yes  Oxygen Therapy  O2 Device: Nasal cannula  Pre Treatment Pain Screening  Pain at present: 3  Scale Used: Numeric Score  Intervention List: Patient able to continue with treatment    Orientation  Orientation  Overall Orientation Status: Within Functional Limits  Cognition      Objective         Ambulation  Ambulation?: No     Balance  Posture: Fair  Sitting - Static: Good  Sitting - Dynamic: Fair;-  Standing - Static: Fair  Standing - Dynamic: Fair;-  Exercises  Quad Sets: 10  Heelslides: 10  Gluteal Sets: 10  Hip Abduction: 10  Knee Short Arc Quad: 10  Ankle Pumps: 10  Comments: Pt performed supine VIRGILIO LE exercises x 10 reps. Patient requires increased time to complete all exercises.                          G-Code OutComes Score                                                     AM-PAC Score     AM-PAC Inpatient Mobility without Stair Climbing Raw Score : 15 (03/05/21 1302)  AM-PAC Inpatient without Stair Climbing T-Scale Score : 43.03 (03/05/21 1302)  Mobility Inpatient CMS 0-100% Score: 47.43 (03/05/21 1302)  Mobility Inpatient without Stair CMS G-Code Modifier : CK (03/05/21 1302)       Goals  Short term goals  Time Frame for Short term goals: 12 visits  Short term goal 1: Pt to be indep with all bed mobility, demo'ing log rolling  Short term goal 2: Pt to be indep with all functional transfers  Short term goal 3: Pt will ambulate 200ft with RW/LRAD and SBA  Short term goal 4: Pt will tolerate 25mins of PT including therex, theract, gait training and neuro re-ed to improve functional mobiilty and activity tolerance. Patient Goals   Patient goals : To feel better    Plan    Plan  Times per week: 1-2x day / 5-6 days per week  Current Treatment Recommendations: Strengthening, Transfer Training, Endurance Training, Neuromuscular Re-education, Patient/Caregiver Education & Training, Balance Training, Gait Training, Home Exercise Program, Functional Mobility Training, Safety Education & Training, Positioning  Safety Devices  Type of devices:  All fall risk precautions in place, Bed alarm in place, Call light within reach, Left in bed     Therapy Time   Individual Concurrent Group Co-treatment   Time In  1105         Time Out  1129         Minutes  24                 Lissa Roles, Student Physical Therapist Assistant

## 2021-03-05 NOTE — PROGRESS NOTES
DATE: 3/5/2021    NAME: Kaylah Tate  MRN: [id number]   : 1945    Patient not seen this date for Physical Therapy due to:  [] Blood transfusion in progress  [] Cancel by RN  [] Hemodialysis  []  Refusal by Patient   [] Spine Precautions   [] Strict Bedrest  [] Surgery  [] Testing      [x] Other NG tube placement        [] PT being discontinued at this time. Patient independent. No further needs. [] PT being discontinued at this time as the patient has been transferred to hospice care. No further needs.     RAUL NOONAN, PTA

## 2021-03-05 NOTE — PROGRESS NOTES
Comprehensive Nutrition Assessment    Type and Reason for Visit:  Consult(Parental Nutrition Order and Management)    Nutrition Recommendations/Plan:   1. Continue NPO status  2. Start Central Clinimix 5/20 TPN total volume 1000 mL today and 100 ml lipids  3. Monitor labs, GI status, TPN rate and tolerance    Nutrition Assessment:  Patient unable to tolerate liquid diet and has been NPO intermittently throughout the week. Nutrition consult received for Parental Nutrition to start today. Patient will have PICC line placed and NG tube today. Recommend central TPN, 1000 mL total volume and 100 mL lipids. Monitor labs, TPN tolerance and GI tolerance. Malnutrition Assessment:  Malnutrition Status:  Mild malnutrition    Context:  Acute Illness     Findings of the 6 clinical characteristics of malnutrition:  Energy Intake:  7 - 50% or less of estimated energy requirements for 5 or more days  Weight Loss:  Unable to assess     Body Fat Loss:  1 - Mild body fat loss Triceps, Orbital   Muscle Mass Loss:  Unable to assess    Fluid Accumulation:  7 - Moderate to Severe Extremities   Strength:  Not Performed    Estimated Daily Nutrient Needs:  Energy (kcal):  8671-5410 (9-11 kcal/kg); Weight Used for Energy Requirements:  Current     Protein (g):  100-110 (2.0-2.2 g/kg IBW); Weight Used for Protein Requirements:  Ideal               Nutrition Related Findings:  +1 BLE edema. Hypoactive bowel sounds.  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, rectosigmoid colonic resection, end colostomy 2/26      Wounds:  Surgical Incision       Current Nutrition Therapies:    Diet NPO Effective Now Exceptions are: Sips of Water with Meds, Ice Chips  Current Parenteral Nutrition Orders:  · Type and Formula: Premix Central, 2-in-1 Custom   · Lipids: 100ml, Daily  · Duration: Continuous  · Rate/Volume: 41.7 ml/hr; 1000 mL total volume  · Current PN Order Provides: 1080 kcal, 50 gm of protein    Anthropometric Measures:  · Height: 5' 2\" (157.5 cm)  · Current Body Weight: 312 lb (141.5 kg)   · Admission Body Weight: 299 lb (135.6 kg)    · Ideal Body Weight: 110 lbs; % Ideal Body Weight 283.6 %   · BMI: 57.1   · BMI Categories: Obese Class 3 (BMI 40.0 or greater)       Nutrition Diagnosis:   · Mild malnutrition, In context of acute illness or injury related to altered GI function as evidenced by mild loss of subcutaneous fat, intake 0-25%      Nutrition Interventions:   Food and/or Nutrient Delivery:  Continue NPO, Start Parenteral Nutrititon  Nutrition Education/Counseling:  Education not indicated   Coordination of Nutrition Care:  Continue to monitor while inpatient    Goals:  PN will meet greater than 75% of estimated nutrition needs       Nutrition Monitoring and Evaluation:   Behavioral-Environmental Outcomes:  None Identified   Food/Nutrient Intake Outcomes:  Parenteral Nutrition Intake/Tolerance  Physical Signs/Symptoms Outcomes:  Biochemical Data, Skin, Weight, Fluid Status or Edema, GI Status     Discharge Planning:     Too soon to determine           Shari PLEITEZN, RDN, LDN  Lead Clinical Dietitian  RD Office Phone [phone]

## 2021-03-05 NOTE — PLAN OF CARE
Nutrition Problem #1: Mild malnutrition, In context of acute illness or injury  Intervention: Food and/or Nutrient Delivery: Continue NPO, Start Parenteral Nutrititon  Nutritional Goals: PN will meet greater than 75% of estimated nutrition needs

## 2021-03-05 NOTE — PROGRESS NOTES
Colorectal Surgery Progress Note    Patient Name: Chris Cedillo  Patient MRN: [id number]  Date: 3/5/2021; 9:25 AM    CC: Intermittent nausea     Subjective:   No acute events overnight. Afebrile, vitals stable. Pain controlled. Tolerating liquid diet. Voiding with good uop. No ostomy output. Objective:    Vitals:    03/05/21 0755   BP: (!) 127/50   Pulse: 73   Resp: 18   Temp: 98.3 °F (36.8 °C)   SpO2: 97%        Gen: alert, awake, NAD  HEENT: moist mucous membranes, no scleral icterus  CV: s1+s2  Lung: equal and symmetric chest rise/fall, non-labored   Abdomen: soft, nd, attp. Incision c/d/i w/ staples. Inferior midline packing in place. Stoma mucosa viable and perfused. Minimal bowel sweat in ostomy appliance. Extremities: no cyanosis or clubbing    I/O last 3 completed shifts: In: 36 [P.O.:1000; I.V.:2560]  Out: 1800 [Urine:1800]    CBC:   Lab Results   Component Value Date    WBC 11.2 03/05/2021    RBC 2.94 03/05/2021    HGB 8.7 03/05/2021    HCT 28.9 03/05/2021    MCV 98.3 03/05/2021    MCH 29.6 03/05/2021    MCHC 30.1 03/05/2021    RDW 14.6 03/05/2021     03/05/2021    MPV 9.7 03/05/2021     BMP:    Lab Results   Component Value Date     03/03/2021    K 3.9 03/03/2021     03/03/2021    CO2 29 03/03/2021    BUN 14 03/03/2021    CREATININE 1.19 03/03/2021    CALCIUM 8.2 03/03/2021    GFRAA 53 03/03/2021    LABGLOM 44 03/03/2021    GLUCOSE 115 03/03/2021       Pathology (2/25/21):    Source of Specimen   1: SIGMOID AND UPPER RECTUM     SIGMOID COLON AND UPPER RECTUM, SEGMENTAL RESECTION:             -  PERFORATED DIVERTICULITIS WITH SEROSAL ADHESIONS AND   ADJACENT MURAL ABSCESS.        -  MARGINS APPEAR VIABLE.        -  BENIGN REACTIVE MESENTERIC LYMPH NODES. Gross Description   \"NOE LUIS ANTONIO, SIGMOID AND UPPER RECTUM\" 15.0 cm long x 4.0 cm in   circumference segment of colon with a large amount of mesenteric fat.    The serosa is pink-tan with adhesions and in an area of dense Ramone Miller saw and examined the patient. I have edited the above and agree with the above. Nehal Miller  Colorectal Surgery  Attending: Kathia Lui MD

## 2021-03-05 NOTE — CARE COORDINATION
[facility] will have bed for patient. They are checking to see if they will need a new precert.  Rahul Person

## 2021-03-06 ENCOUNTER — APPOINTMENT (OUTPATIENT)
Dept: GENERAL RADIOLOGY | Age: 76
DRG: 981 | End: 2021-03-06
Attending: COLON & RECTAL SURGERY
Payer: MEDICARE

## 2021-03-06 LAB
-: ABNORMAL
ABSOLUTE EOS #: 0.24 K/UL (ref 0–0.44)
ABSOLUTE IMMATURE GRANULOCYTE: 0.71 K/UL (ref 0–0.3)
ABSOLUTE LYMPH #: 0.95 K/UL (ref 1.1–3.7)
ABSOLUTE MONO #: 1.31 K/UL (ref 0.1–1.2)
ALBUMIN SERPL-MCNC: 2.5 G/DL (ref 3.5–5.2)
ALBUMIN/GLOBULIN RATIO: ABNORMAL (ref 1–2.5)
ALP BLD-CCNC: 79 U/L (ref 35–104)
ALT SERPL-CCNC: 12 U/L (ref 5–33)
AMORPHOUS: ABNORMAL
ANION GAP SERPL CALCULATED.3IONS-SCNC: 10 MMOL/L (ref 9–17)
AST SERPL-CCNC: 18 U/L
BACTERIA: ABNORMAL
BASOPHILS # BLD: 0 % (ref 0–2)
BASOPHILS ABSOLUTE: 0 K/UL (ref 0–0.2)
BILIRUB SERPL-MCNC: 0.31 MG/DL (ref 0.3–1.2)
BILIRUBIN URINE: NEGATIVE
BUN BLDV-MCNC: 11 MG/DL (ref 8–23)
BUN/CREAT BLD: 12 (ref 9–20)
CALCIUM SERPL-MCNC: 8.2 MG/DL (ref 8.6–10.4)
CASTS UA: ABNORMAL /LPF
CHLORIDE BLD-SCNC: 100 MMOL/L (ref 98–107)
CO2: 28 MMOL/L (ref 20–31)
COLOR: ABNORMAL
COMMENT UA: ABNORMAL
CREAT SERPL-MCNC: 0.91 MG/DL (ref 0.5–0.9)
CRYSTALS, UA: ABNORMAL /HPF
DIFFERENTIAL TYPE: ABNORMAL
EOSINOPHILS RELATIVE PERCENT: 2 % (ref 1–4)
EPITHELIAL CELLS UA: ABNORMAL /HPF (ref 0–5)
GFR AFRICAN AMERICAN: >60 ML/MIN
GFR NON-AFRICAN AMERICAN: >60 ML/MIN
GFR SERPL CREATININE-BSD FRML MDRD: ABNORMAL ML/MIN/{1.73_M2}
GFR SERPL CREATININE-BSD FRML MDRD: ABNORMAL ML/MIN/{1.73_M2}
GLUCOSE BLD-MCNC: 126 MG/DL (ref 65–105)
GLUCOSE BLD-MCNC: 130 MG/DL (ref 70–99)
GLUCOSE URINE: NEGATIVE
HCT VFR BLD CALC: 29.1 % (ref 36.3–47.1)
HEMOGLOBIN: 8.6 G/DL (ref 11.9–15.1)
IMMATURE GRANULOCYTES: 6 %
KETONES, URINE: NEGATIVE
LACTIC ACID: 1.5 MMOL/L (ref 0.5–2.2)
LEUKOCYTE ESTERASE, URINE: ABNORMAL
LYMPHOCYTES # BLD: 8 % (ref 24–43)
MAGNESIUM: 1.5 MG/DL (ref 1.6–2.6)
MCH RBC QN AUTO: 29.2 PG (ref 25.2–33.5)
MCHC RBC AUTO-ENTMCNC: 29.6 G/DL (ref 28.4–34.8)
MCV RBC AUTO: 98.6 FL (ref 82.6–102.9)
MONOCYTES # BLD: 11 % (ref 3–12)
MUCUS: ABNORMAL
NITRITE, URINE: POSITIVE
NRBC AUTOMATED: 0 PER 100 WBC
OTHER OBSERVATIONS UA: ABNORMAL
PDW BLD-RTO: 14.6 % (ref 11.8–14.4)
PH UA: 5.5 (ref 5–8)
PHOSPHORUS: 2.8 MG/DL (ref 2.6–4.5)
PLATELET # BLD: 259 K/UL (ref 138–453)
PLATELET ESTIMATE: ABNORMAL
PMV BLD AUTO: 9.9 FL (ref 8.1–13.5)
POTASSIUM SERPL-SCNC: 3.8 MMOL/L (ref 3.7–5.3)
PREALBUMIN: 7.7 MG/DL (ref 20–40)
PROCALCITONIN: 0.18 NG/ML
PROTEIN UA: NEGATIVE
RBC # BLD: 2.95 M/UL (ref 3.95–5.11)
RBC # BLD: ABNORMAL 10*6/UL
RBC UA: ABNORMAL /HPF (ref 0–2)
RENAL EPITHELIAL, UA: ABNORMAL /HPF
SEG NEUTROPHILS: 73 % (ref 36–65)
SEGMENTED NEUTROPHILS ABSOLUTE COUNT: 8.69 K/UL (ref 1.5–8.1)
SODIUM BLD-SCNC: 138 MMOL/L (ref 135–144)
SPECIFIC GRAVITY UA: 1.01 (ref 1–1.03)
TOTAL PROTEIN: 6.3 G/DL (ref 6.4–8.3)
TRICHOMONAS: ABNORMAL
TRIGL SERPL-MCNC: 128 MG/DL
TURBIDITY: ABNORMAL
URINE HGB: ABNORMAL
UROBILINOGEN, URINE: NORMAL
WBC # BLD: 11.9 K/UL (ref 3.5–11.3)
WBC # BLD: ABNORMAL 10*3/UL
WBC UA: ABNORMAL /HPF (ref 0–5)
YEAST: ABNORMAL

## 2021-03-06 PROCEDURE — 97530 THERAPEUTIC ACTIVITIES: CPT

## 2021-03-06 PROCEDURE — 97116 GAIT TRAINING THERAPY: CPT

## 2021-03-06 PROCEDURE — 87086 URINE CULTURE/COLONY COUNT: CPT

## 2021-03-06 PROCEDURE — 87040 BLOOD CULTURE FOR BACTERIA: CPT

## 2021-03-06 PROCEDURE — 6360000002 HC RX W HCPCS: Performed by: INTERNAL MEDICINE

## 2021-03-06 PROCEDURE — 81001 URINALYSIS AUTO W/SCOPE: CPT

## 2021-03-06 PROCEDURE — 2700000000 HC OXYGEN THERAPY PER DAY

## 2021-03-06 PROCEDURE — 84478 ASSAY OF TRIGLYCERIDES: CPT

## 2021-03-06 PROCEDURE — 94640 AIRWAY INHALATION TREATMENT: CPT

## 2021-03-06 PROCEDURE — 2500000003 HC RX 250 WO HCPCS: Performed by: INTERNAL MEDICINE

## 2021-03-06 PROCEDURE — 6370000000 HC RX 637 (ALT 250 FOR IP): Performed by: COLON & RECTAL SURGERY

## 2021-03-06 PROCEDURE — 97110 THERAPEUTIC EXERCISES: CPT

## 2021-03-06 PROCEDURE — 2060000000 HC ICU INTERMEDIATE R&B

## 2021-03-06 PROCEDURE — 6360000002 HC RX W HCPCS: Performed by: STUDENT IN AN ORGANIZED HEALTH CARE EDUCATION/TRAINING PROGRAM

## 2021-03-06 PROCEDURE — 6370000000 HC RX 637 (ALT 250 FOR IP): Performed by: INTERNAL MEDICINE

## 2021-03-06 PROCEDURE — 80053 COMPREHEN METABOLIC PANEL: CPT

## 2021-03-06 PROCEDURE — 85025 COMPLETE CBC W/AUTO DIFF WBC: CPT

## 2021-03-06 PROCEDURE — 6370000000 HC RX 637 (ALT 250 FOR IP): Performed by: STUDENT IN AN ORGANIZED HEALTH CARE EDUCATION/TRAINING PROGRAM

## 2021-03-06 PROCEDURE — 87186 SC STD MICRODIL/AGAR DIL: CPT

## 2021-03-06 PROCEDURE — 36415 COLL VENOUS BLD VENIPUNCTURE: CPT

## 2021-03-06 PROCEDURE — 84145 PROCALCITONIN (PCT): CPT

## 2021-03-06 PROCEDURE — 82947 ASSAY GLUCOSE BLOOD QUANT: CPT

## 2021-03-06 PROCEDURE — C9113 INJ PANTOPRAZOLE SODIUM, VIA: HCPCS | Performed by: INTERNAL MEDICINE

## 2021-03-06 PROCEDURE — 83735 ASSAY OF MAGNESIUM: CPT

## 2021-03-06 PROCEDURE — 87088 URINE BACTERIA CULTURE: CPT

## 2021-03-06 PROCEDURE — 2500000003 HC RX 250 WO HCPCS: Performed by: COLON & RECTAL SURGERY

## 2021-03-06 PROCEDURE — 84100 ASSAY OF PHOSPHORUS: CPT

## 2021-03-06 PROCEDURE — 2580000003 HC RX 258: Performed by: STUDENT IN AN ORGANIZED HEALTH CARE EDUCATION/TRAINING PROGRAM

## 2021-03-06 PROCEDURE — 6360000002 HC RX W HCPCS: Performed by: COLON & RECTAL SURGERY

## 2021-03-06 PROCEDURE — 83605 ASSAY OF LACTIC ACID: CPT

## 2021-03-06 PROCEDURE — 74018 RADEX ABDOMEN 1 VIEW: CPT

## 2021-03-06 PROCEDURE — 84134 ASSAY OF PREALBUMIN: CPT

## 2021-03-06 PROCEDURE — 97110 THERAPEUTIC EXERCISES: CPT | Performed by: NURSE PRACTITIONER

## 2021-03-06 RX ORDER — NICOTINE POLACRILEX 4 MG
15 LOZENGE BUCCAL PRN
Status: DISCONTINUED | OUTPATIENT
Start: 2021-03-06 | End: 2021-03-12 | Stop reason: HOSPADM

## 2021-03-06 RX ORDER — DEXTROSE MONOHYDRATE 25 G/50ML
12.5 INJECTION, SOLUTION INTRAVENOUS PRN
Status: DISCONTINUED | OUTPATIENT
Start: 2021-03-06 | End: 2021-03-12 | Stop reason: HOSPADM

## 2021-03-06 RX ORDER — MAGNESIUM SULFATE IN WATER 40 MG/ML
2000 INJECTION, SOLUTION INTRAVENOUS ONCE
Status: COMPLETED | OUTPATIENT
Start: 2021-03-06 | End: 2021-03-06

## 2021-03-06 RX ORDER — DEXTROSE MONOHYDRATE 50 MG/ML
100 INJECTION, SOLUTION INTRAVENOUS PRN
Status: DISCONTINUED | OUTPATIENT
Start: 2021-03-06 | End: 2021-03-12 | Stop reason: HOSPADM

## 2021-03-06 RX ORDER — CIPROFLOXACIN 2 MG/ML
200 INJECTION, SOLUTION INTRAVENOUS EVERY 12 HOURS
Status: DISCONTINUED | OUTPATIENT
Start: 2021-03-06 | End: 2021-03-09

## 2021-03-06 RX ADMIN — I.V. FAT EMULSION 100 ML: 20 EMULSION INTRAVENOUS at 17:55

## 2021-03-06 RX ADMIN — HEPARIN SODIUM 5000 UNITS: 5000 INJECTION INTRAVENOUS; SUBCUTANEOUS at 20:33

## 2021-03-06 RX ADMIN — HYDROCODONE BITARTRATE AND ACETAMINOPHEN 1 TABLET: 5; 325 TABLET ORAL at 06:11

## 2021-03-06 RX ADMIN — CITALOPRAM HYDROBROMIDE 10 MG: 10 TABLET ORAL at 09:51

## 2021-03-06 RX ADMIN — HEPARIN SODIUM 5000 UNITS: 5000 INJECTION INTRAVENOUS; SUBCUTANEOUS at 06:10

## 2021-03-06 RX ADMIN — PHENOL 1 SPRAY: 1.5 LIQUID ORAL at 11:59

## 2021-03-06 RX ADMIN — SODIUM CHLORIDE, PRESERVATIVE FREE 10 ML: 5 INJECTION INTRAVENOUS at 21:02

## 2021-03-06 RX ADMIN — HEPARIN SODIUM 5000 UNITS: 5000 INJECTION INTRAVENOUS; SUBCUTANEOUS at 13:59

## 2021-03-06 RX ADMIN — BUDESONIDE AND FORMOTEROL FUMARATE DIHYDRATE 2 PUFF: 160; 4.5 AEROSOL RESPIRATORY (INHALATION) at 09:39

## 2021-03-06 RX ADMIN — TORSEMIDE 20 MG: 20 TABLET ORAL at 09:51

## 2021-03-06 RX ADMIN — CALCIUM GLUCONATE: 94 INJECTION, SOLUTION INTRAVENOUS at 17:55

## 2021-03-06 RX ADMIN — ATORVASTATIN CALCIUM 20 MG: 20 TABLET, FILM COATED ORAL at 09:51

## 2021-03-06 RX ADMIN — SUCRALFATE 1 G: 1 TABLET ORAL at 11:59

## 2021-03-06 RX ADMIN — MAGNESIUM SULFATE HEPTAHYDRATE 2000 MG: 2 INJECTION, SOLUTION INTRAVENOUS at 12:00

## 2021-03-06 RX ADMIN — GUAIFENESIN 600 MG: 600 TABLET, EXTENDED RELEASE ORAL at 09:51

## 2021-03-06 RX ADMIN — METOPROLOL TARTRATE 25 MG: 25 TABLET, FILM COATED ORAL at 09:51

## 2021-03-06 RX ADMIN — FLUTICASONE PROPIONATE 1 SPRAY: 50 SPRAY, METERED NASAL at 09:59

## 2021-03-06 RX ADMIN — GUAIFENESIN 600 MG: 600 TABLET, EXTENDED RELEASE ORAL at 20:33

## 2021-03-06 RX ADMIN — SPIRONOLACTONE 25 MG: 25 TABLET ORAL at 09:51

## 2021-03-06 RX ADMIN — HYDROCODONE BITARTRATE AND ACETAMINOPHEN 1 TABLET: 5; 325 TABLET ORAL at 21:01

## 2021-03-06 RX ADMIN — DEXTROSE MONOHYDRATE, SODIUM CHLORIDE, AND POTASSIUM CHLORIDE: 50; 4.5; 1.49 INJECTION, SOLUTION INTRAVENOUS at 06:13

## 2021-03-06 RX ADMIN — METOPROLOL TARTRATE 25 MG: 25 TABLET, FILM COATED ORAL at 20:33

## 2021-03-06 RX ADMIN — BUDESONIDE AND FORMOTEROL FUMARATE DIHYDRATE 2 PUFF: 160; 4.5 AEROSOL RESPIRATORY (INHALATION) at 21:06

## 2021-03-06 RX ADMIN — PHENOL 1 SPRAY: 1.5 LIQUID ORAL at 17:37

## 2021-03-06 RX ADMIN — PANTOPRAZOLE SODIUM 40 MG: 40 INJECTION, POWDER, FOR SOLUTION INTRAVENOUS at 09:51

## 2021-03-06 RX ADMIN — SUCRALFATE 1 G: 1 TABLET ORAL at 17:38

## 2021-03-06 RX ADMIN — SUCRALFATE 1 G: 1 TABLET ORAL at 06:11

## 2021-03-06 RX ADMIN — SODIUM CHLORIDE, PRESERVATIVE FREE 10 ML: 5 INJECTION INTRAVENOUS at 09:58

## 2021-03-06 RX ADMIN — CIPROFLOXACIN 200 MG: 2 INJECTION, SOLUTION INTRAVENOUS at 20:33

## 2021-03-06 RX ADMIN — BUPROPION HYDROCHLORIDE 150 MG: 150 TABLET, EXTENDED RELEASE ORAL at 09:51

## 2021-03-06 ASSESSMENT — PAIN DESCRIPTION - LOCATION: LOCATION: ABDOMEN

## 2021-03-06 ASSESSMENT — PAIN DESCRIPTION - PAIN TYPE: TYPE: ACUTE PAIN

## 2021-03-06 ASSESSMENT — PAIN SCALES - GENERAL
PAINLEVEL_OUTOF10: 7
PAINLEVEL_OUTOF10: 3
PAINLEVEL_OUTOF10: 7
PAINLEVEL_OUTOF10: 3
PAINLEVEL_OUTOF10: 3

## 2021-03-06 NOTE — PROGRESS NOTES
Diverticulitis, Diverticulosis of large intestine, Diverticulosis of large intestine without hemorrhage, Encounter for mammogram to establish baseline mammogram, Essential hypertension, H/O hysterectomy for benign disease, Heart murmur, History of blood transfusion, Hyperlipidemia, Hypothermia due to anesthesia, Laceration of chest wall, Mitral incompetence, Morbid obesity ([address] Non-rheumatic mitral regurgitation, Polyp of sigmoid colon, Postmenopausal status, Routine general medical examination at a health care facility, Special screening for malignant neoplasms, colon, Special screening for malignant neoplasms, vagina, and Urinary incontinence. has a past surgical history that includes Appendectomy; Mitral valve repair; Hysterectomy; bronchoscopy; Cardiac catheterization (03/22/2017); Cardiac catheterization (11/18*/2016); Wound Exploration; other surgical history; Ovary removal; eye surgery (Bilateral); Colonoscopy; Endoscopy, colon, diagnostic; fracture surgery; and Small intestine surgery (N/A, 2/25/2021). Restrictions  Restrictions/Precautions  Restrictions/Precautions: General Precautions, Surgical Protocols, Fall Risk, Up as Tolerated  Required Braces or Orthoses?: No  Position Activity Restriction  Other position/activity restrictions: 2L O2, RUE IV, incision on stomach region with dressing, colostomy, garrido cath, ambulate pt, telemetry  Subjective   General  Chart Reviewed: Yes  Response To Previous Treatment: Patient with no complaints from previous session. Family / Caregiver Present: No  Subjective  Subjective: Pt alert and agreeable to PT treatment. General Comment  Comments: CATHY Barney  reports pt medically stable for PT treatment.           Orientation  Orientation  Overall Orientation Status: Within Functional Limits  Cognition      Objective   Bed mobility  Bridging: Minimal assistance  Rolling to Left: Contact guard assistance  Rolling to Right: Contact guard assistance  Supine to Sit: Minimal

## 2021-03-06 NOTE — PROGRESS NOTES
Dr. Troy Dominguez phoned with results of JONELLEBJustin Cortez to clamp Ng tube and new orders received for PRN throat spray for throat pain. Patient updated.

## 2021-03-06 NOTE — PROGRESS NOTES
incompetence, Morbid obesity ([facility] [address] Non-rheumatic mitral regurgitation, Polyp of sigmoid colon, Postmenopausal status, Routine general medical examination at a health care facility, Special screening for malignant neoplasms, colon, Special screening for malignant neoplasms, vagina, and Urinary incontinence. has a past surgical history that includes Appendectomy; Mitral valve repair; Hysterectomy; bronchoscopy; Cardiac catheterization (03/22/2017); Cardiac catheterization (11/18*/2016); Wound Exploration; other surgical history; Ovary removal; eye surgery (Bilateral); Colonoscopy; Endoscopy, colon, diagnostic; fracture surgery; and Small intestine surgery (N/A, 2/25/2021). Restrictions  Restrictions/Precautions  Restrictions/Precautions: General Precautions, Surgical Protocols, Fall Risk, Up as Tolerated  Required Braces or Orthoses?: No  Position Activity Restriction  Other position/activity restrictions: 2L O2, RUE IV, incision on stomach region with dressing, colostomy, garrido cath, ambulate pt, telemetry  Subjective   General  Chart Reviewed: Yes  Patient assessed for rehabilitation services?: Yes  Response to previous treatment: Patient with no complaints from previous session  Family / Caregiver Present: No  Objective     Bed mobility  Comment: Pt in chair upon arrival. Edu on posture correction and importance of sitting upright in recliner. Type of ROM/Therapeutic Exercise  Comment:  Pt instructed in use of incentive spirometer including technique, frequency of use, and purpose of use. Pt verbalize and demo good understanding.  Reviewed previously issued HEP  Exercises   Supine Chest Stretch with Elbows Bent - 10 reps - 3 sets - 1x daily - 7x weekly   Seated Upright Posture Correction - 10 reps - 3 sets - 1x daily - 7x weekly   Seated Diaphragmatic Breathing - 10 reps - 3 sets - 1x daily - 7x weekly         Plan   Plan  Times per week: 4-5x per week, 1-2x per day as cayetano  Times per day: Daily  Current Treatment Recommendations: Strengthening, Balance Training, Safety Education & Training, Self-Care / ADL, Equipment Evaluation, Education, & procurement, Endurance Training, Patient/Caregiver Education & Training, Home Management Training, Functional Mobility Training, Pain Management    AM-PAC Score        AM-PAC Inpatient Daily Activity Raw Score: 15 (03/06/21 1329)  AM-PAC Inpatient ADL T-Scale Score : 34.69 (03/06/21 1329)  ADL Inpatient CMS 0-100% Score: 56.46 (03/06/21 1329)  ADL Inpatient CMS G-Code Modifier : CK (03/06/21 1329)    Goals  Short term goals  Time Frame for Short term goals: by discharge, pt to demo  Short term goal 1: S/MI for bed mob tasks with use of bed rail as needed  Short term goal 2: S/MI for UB ADLs and SBA for LB ADLs with AE as needed and Good safety  Short term goal 3: S/MI for ADL transfers and functional mob with least restrictive mob device and Good safety  Short term goal 4: Pt/family to be IND with EC/WS, precautions, and fall prevention tech as well as DME/AE recommendations with use of handouts  Patient Goals   Patient goals : Pt states she wants to go home however is agreeable to rehab if needed. Therapy Time   Individual Concurrent Group Co-treatment   Time In 1244(additional 5 mins creating personalized HEP)         Time Out 1253         Minutes 9              Upon writer exit, call light within reach, pt retired to chair. All lines intact and patient positioned comfortably. Chair alarm in place. All patient needs addressed prior to ending therapy session. Chart reviewed prior to treatment and patient is agreeable for therapy. RN reports patient is medically stable for therapy treatment this date.     CORNELIO Fox

## 2021-03-06 NOTE — PLAN OF CARE
Nutrition Problem #1: Mild malnutrition, In context of acute illness or injury  Intervention: Food and/or Nutrient Delivery: Continue NPO, Modify Parenteral Nutrition  Nutritional Goals: PN will meet greater than 75% of estimated nutrition needs

## 2021-03-06 NOTE — PROGRESS NOTES
Colorectal Surgery Progress Note    Patient Name: Bakari Diver  Patient MRN: [id number]  Date: 3/6/2021; 12:29 PM    CC:nausea improving     Subjective:   No acute events overnight. Nausea has resolved, starting to have ostomy output. Up oob in chair. Pain well controlled. Afebrile. Objective:    Vitals:    03/06/21 1205   BP: (!) 118/36   Pulse: 71   Resp: 22   Temp: 98.5 °F (36.9 °C)   SpO2: 96%        Gen: alert, awake, NAD  HEENT: moist mucous membranes, no scleral icterus  CV: s1+s2  Lung: equal and symmetric chest rise/fall, non-labored   Abdomen: soft, nd, attp. Incision c/d/i w/ staples. Inferior midline packing in place. Stoma mucosa viable and perfused with stool in appliance   Extremities: no cyanosis or clubbing    I/O last 3 completed shifts: In: 1526 [P.O.:90; I.V.:1436]  Out: 2850 [Urine:2450; Emesis/NG output:300; Stool:100]    CBC:   Lab Results   Component Value Date    WBC 11.9 03/06/2021    RBC 2.95 03/06/2021    HGB 8.6 03/06/2021    HCT 29.1 03/06/2021    MCV 98.6 03/06/2021    MCH 29.2 03/06/2021    MCHC 29.6 03/06/2021    RDW 14.6 03/06/2021     03/06/2021    MPV 9.9 03/06/2021     BMP:    Lab Results   Component Value Date     03/06/2021    K 3.8 03/06/2021     03/06/2021    CO2 28 03/06/2021    BUN 11 03/06/2021    LABALBU 2.5 03/06/2021    CREATININE 0.91 03/06/2021    CALCIUM 8.2 03/06/2021    GFRAA >60 03/06/2021    LABGLOM >60 03/06/2021    GLUCOSE 130 03/06/2021       Pathology (2/25/21):    Source of Specimen   1: SIGMOID AND UPPER RECTUM     SIGMOID COLON AND UPPER RECTUM, SEGMENTAL RESECTION:             -  PERFORATED DIVERTICULITIS WITH SEROSAL ADHESIONS AND   ADJACENT MURAL ABSCESS.        -  MARGINS APPEAR VIABLE.        -  BENIGN REACTIVE MESENTERIC LYMPH NODES. Gross Description   \"NOE LUIS ANTONIO, SIGMOID AND UPPER RECTUM\" 15.0 cm long x 4.0 cm in   circumference segment of colon with a large amount of mesenteric fat.    The serosa is pink-tan with adhesions and in an area of dense   adhesions, there is a 0.2 cm transmural defect that is 4.0 cm from the   closest margin.  The mucosa is pink-tan with diverticula up to 1.5 cm   and in the center of the segment, away from the aforementioned defect,   there is a region of cavitation within the wall and fat consistent   with abscess. Mackenzie Maizes are no masses.  The wall ranges in thickness from   0.2 to 0.8 cm with no marked stricture.  Within the fat there are a   few rubbery nodes up to 0.4 cm.  Cassette summary:  \"A\" resection   margins shave, \"B-C\" transmural defect, \"D\" separate abscess and   representative nodes.  tm       Xr Abdomen (kub) (single Ap View)  Result Date: 3/5/2021  Multiple dilated gas-filled small bowel loops with scattered areas of gas in the colon. There are more dilated loops appreciated compared to prior. Differential remains ileus versus obstruction although the progression is more worrisome for obstruction. Xr Chest Portable  Result Date: 3/4/2021  1. Enlarged cardiac silhouette with prominence of the pulmonary vasculature. 2. Minimal bibasilar opacification, which is unchanged. 3. No large pleural effusions. Assessment/Plan:     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 1. Continue TPN  2. Clamp ngt, ok for ice chips, sips and pop abimael. if nausea/emesis place back to [facility] and make NPO. If pt tolerates ngt clamp today will plan to start cld 3/7/21. Will continue tpn until tolerating general diet. Most likely plan to d/c tpn prior to d/c.   3. IVF - total IV 125cc/h  4. dvt ppx: hep  5. Appreciate IM assistance with medical management. 6. Wound care: continue daily packing changes. Ok to shower        I Dr. Yonatan Waddell saw and examined the patient. I have edited the above and agree with the above. Nehal Ricks  Colorectal Surgery

## 2021-03-06 NOTE — PROGRESS NOTES
Subjective:     Follow-up CHF  No chest pain no palpitation no shortness of breath no tachypnea occasional cough  ROS  Fever no chills no  or cardiac complaints mild abdominal discomfort NG still in, no TIA no bleeding no headache no sore throat no skin lesions no polyuria polydipsia  physical exam  General Appearance: in no acute distress and alert  Skin: warm and dry, no rash or erythema  Head: normocephalic and atraumatic  Eyes: pupils equal, round, and reactive to light and sclera anicteric    Neck: neck supple and non tender without mass   Pulmonary/Chest: Air entry equal no rhonchi decreased at the bases no use of intercostal muscles  Cardiovascular: normal rate, normal S1 and S2, no murmurs, no gallops, intact distal pulses and no carotid bruits  Abdomen: soft, non-tender, non-distended, hypoactive bowel sounds  Extremities: Trace edema good pulses negative Homans  Neurologic: Alert oriented x3 no focal neuro    BP (!) 118/36   Pulse 71   Temp 98.5 °F (36.9 °C) (Oral)   Resp 22   Ht 5' 2\" (1.575 m)   Wt (!) 302 lb 4 oz (137.1 kg)   SpO2 96%   BMI 55.28 kg/m²     CBC:   Lab Results   Component Value Date    WBC 11.9 03/06/2021    RBC 2.95 03/06/2021    HGB 8.6 03/06/2021    HCT 29.1 03/06/2021    MCV 98.6 03/06/2021    MCH 29.2 03/06/2021    MCHC 29.6 03/06/2021    RDW 14.6 03/06/2021     03/06/2021    MPV 9.9 03/06/2021     BMP:    Lab Results   Component Value Date     03/06/2021    K 3.8 03/06/2021     03/06/2021    CO2 28 03/06/2021    BUN 11 03/06/2021    LABALBU 2.5 03/06/2021    CREATININE 0.91 03/06/2021    CALCIUM 8.2 03/06/2021    GFRAA >60 03/06/2021    LABGLOM >60 03/06/2021    GLUCOSE 130 03/06/2021        Assessment:  Patient Active Problem List   Diagnosis    Acute respiratory failure following trauma and surgery (HCC)    Adiposity    Arthralgia of multiple joints    Asthma    Asthma with acute exacerbation    Atopic rhinitis    CHF (congestive heart failure) ([address]    Chronic bilateral low back pain without sciatica    Closed fracture of proximal end of humerus    Depression    Diverticulitis    Diverticulosis of large intestine    Diverticulosis of large intestine without hemorrhage    Essential hypertension    H/O hysterectomy for benign disease    Heart murmur    Hyperlipidemia    Hypothermia due to anesthesia    Laceration of chest wall    Mitral incompetence    Morbid obesity (HCC)    Non-rheumatic mitral regurgitation    Polyp of sigmoid colon    Postmenopausal status    Urinary incontinence    S/P colectomy     Acute on chronic respiratory failure managed by pulmonary  Morbid obesity excess calories  JAYDEN on CPAP  CKD 3 a currently CKD 2  Acute blood loss anemia  Acute on chronic diastolic CHF compensated  Postoperative paralytic ileus  Hypomagnesemia  No evidence of sepsis  Plan:    Meds labs reviewed continue with TPN magnesium supplement avoid nephrotoxic drugs physical therapy occupational therapy see orders check lactic acid check procalcitonin      Monica Mojica MD  2:01 PM

## 2021-03-06 NOTE — PROGRESS NOTES
Orders received from Dr. Jim Miller rounding for KUB and Mag replacement. Dr. Jim Miller would like called with KUB results.

## 2021-03-06 NOTE — PROGRESS NOTES
Pulmonary Critical Care Progress Note  Leoind Shannon MD     Patient seen for the follow up of acute respiratory insufficiency, CHF, asthma, JAYDEN    Subjective:  No significant overnight events noted. She is resting comfortably in bed. She did not wear her CPAP machine last night secondary to her NG tube. Shortness of breath is improved. She denies any chest pain, has occasional productive cough. She did have a small amount of output from her colostomy this morning. Examination:  Vitals: BP (!) 129/46   Pulse 72   Temp 98.1 °F (36.7 °C) (Oral)   Resp 20   Ht 5' 2\" (1.575 m)   Wt (!) 302 lb 4 oz (137.1 kg)   SpO2 95%   BMI 55.28 kg/m²   General appearance: alert and cooperative with exam, resting in bed  Neck: No JVD  Lungs: Moderate air exchange, no wheezing  Heart: regular rate and rhythm, S1, S2 normal, no gallop  Abdomen: Soft, non tender, + BS  Extremities: no cyanosis or clubbing. No significant edema    LABs:  CBC:   Recent Labs     03/04/21  0605 03/05/21  0547   WBC 10.8 11.2   HGB 8.9* 8.7*   HCT 29.7* 28.9*    269     BMP:   Recent Labs     03/05/21  0547      K 4.0   CO2 27   BUN 12   CREATININE 1.07*   LABGLOM 50*   GLUCOSE 110*     Radiology:  X-ray chest:  3/5/2021      KUB:  3/5/2021  Multiple dilated gas-filled small bowel loops with scattered areas of gas in   the colon.  There are more dilated loops appreciated compared to prior.    Differential remains ileus versus obstruction although the progression is   more worrisome for obstruction       Impression:  · Acute on chronic hypoxic respiratory insufficiency, on nocturnal oxygen  · Mild pulmonary edema  · Acute on chronic diastolic HF  · History of asthma  · Obstructive sleep apnea/morbid obesity, on CPAP therapy  · Status post exploratory laparotomy, sigmoid/superior rectum resection and creation of end colostomy and lysis of adhesions with mobilization of splenic flexure 02/25/2021  · SHARYN on CKD  · CAD, HLD, HTN MVR  · Postoperative ileus versus obstruction    Recommendations:  · Oxygen by nasal cannula, keep SPO2 greater than 90%  · Oxygen evaluation prior to discharge albuterol and Ipratropium Q 4 hours and prn  · Symbicort 160  · Demadex and Aldactone  · Amiodarone   · Decrease IV fluids to Stoner Christen since TPN has been initiated  · Pain control   · NPO/NG tube management. /TPN as per general surgery  · Wound care/Ostomy care   · PT/OT  · Labs: CBC and BMP in am; monitor renal function   · DVT prophylaxis with subcu heparin  · Incentive spirometry every hour while awake  · Will follow with you    Electronically signed by     Da Henriquez MD on 3/6/2021 at 11:34 AM  Pulmonary Critical Care and Sleep Medicine,  [facility]  Cell: [phone]  Office: [phone]

## 2021-03-06 NOTE — PROGRESS NOTES
Subjective:     Follow-up CHF  No fever no chills no cough no shortness of breath no wheezing  ROS  Had abdominal pain distention, no fever no chills no  or cardiac complaints no pulmonary complaints no TIA no bleeding no headache no sore throat no skin no polyuria no polydipsia  physical exam  General Appearance: in no acute distress and alert  Skin: warm and dry, no rash or erythema  Head: normocephalic and atraumatic  Eyes: pupils equal, round, and reactive to light, conjunctivae normal and sclera anicteric     Neck: neck supple and non tender without mass   Pulmonary/Chest: clear to auscultation bilaterally- no wheezes, rales or rhonchi, normal air movement, no respiratory distress  Cardiovascular: normal rate, regular rhythm, normal S1 and S2, no gallops, intact distal pulses and no carotid bruits  Abdomen: soft, non-tender, non-distended, Extremities: trace edema and pulses no Homans  Neurologic: Alert oriented x3 with no focal deficit    BP (!) 126/49   Pulse 77   Temp 98.4 °F (36.9 °C) (Oral)   Resp 18   Ht 5' 2\" (1.575 m)   Wt (!) 312 lb 6.4 oz (141.7 kg)   SpO2 97%   BMI 57.14 kg/m²     CBC:   Lab Results   Component Value Date    WBC 11.2 03/05/2021    RBC 2.94 03/05/2021    HGB 8.7 03/05/2021    HCT 28.9 03/05/2021    MCV 98.3 03/05/2021    MCH 29.6 03/05/2021    MCHC 30.1 03/05/2021    RDW 14.6 03/05/2021     03/05/2021    MPV 9.7 03/05/2021     BMP:    Lab Results   Component Value Date     03/05/2021    K 4.0 03/05/2021     03/05/2021    CO2 27 03/05/2021    BUN 12 03/05/2021    CREATININE 1.07 03/05/2021    CALCIUM 8.3 03/05/2021    GFRAA >60 03/05/2021    LABGLOM 50 03/05/2021    GLUCOSE 110 03/05/2021        Assessment:  Patient Active Problem List   Diagnosis    Acute respiratory failure following trauma and surgery (HCC)    Adiposity    Arthralgia of multiple joints    Asthma    Asthma with acute exacerbation    Atopic rhinitis    CHF (congestive heart failure) ([address]    Chronic bilateral low back pain without sciatica    Closed fracture of proximal end of humerus    Depression    Diverticulitis    Diverticulosis of large intestine    Diverticulosis of large intestine without hemorrhage    Essential hypertension    H/O hysterectomy for benign disease    Heart murmur    Hyperlipidemia    Hypothermia due to anesthesia    Laceration of chest wall    Mitral incompetence    Morbid obesity (HCC)    Non-rheumatic mitral regurgitation    Polyp of sigmoid colon    Postmenopausal status    Urinary incontinence    S/P colectomy     Acute on chronic respiratory failure managed by pulmonary  Morbid obesity excess calories  JAYDEN on CPAP  CKD 3A  Acute blood loss anemia stable  Acute on chronic diastolic CHF better compensated  Postoperative paralytic ileus  Plan:    Meds labs reviewed PICC line in and and TPN to be started NG to intermittent suction KUB reviewed, IV fluids DVT prophylaxis physical therapy occupational therapy see orders      Felipe Peters MD  7:48 PM

## 2021-03-06 NOTE — PLAN OF CARE
Problem: Falls - Risk of:  Goal: Will remain free from falls  Description: Will remain free from falls  Outcome: Ongoing  Note: Pt assessed as a fall risk this shift. Remains free from falls and accidental injury at this time. Fall precautions in place, including falling star sign and fall risk band on pt. Floor free from obstacles, and bed is locked and in lowest position. Adequate lighting provided. Pt encouraged to call before getting OOB for any need. Bed alarm activated. Will continue to monitor needs during hourly rounding, and reinforce education on use of call light. Problem: Skin Integrity:  Goal: Absence of new skin breakdown  Description: Absence of new skin breakdown  Outcome: Ongoing  Note: Skin assessment completed, no new signs of breakdown. Patient instructed to turn self when applicable.

## 2021-03-06 NOTE — PROGRESS NOTES
Physical Signs/Symptoms Outcomes:  Biochemical Data, Skin, Weight, Fluid Status or Edema, GI Status     Discharge Planning:     Too soon to determine         Lucia PLEITEZN, RDN, LDN  Lead Clinical Dietitian  RD Office Phone [phone]

## 2021-03-07 ENCOUNTER — APPOINTMENT (OUTPATIENT)
Dept: GENERAL RADIOLOGY | Age: 76
DRG: 981 | End: 2021-03-07
Attending: COLON & RECTAL SURGERY
Payer: MEDICARE

## 2021-03-07 LAB
ABSOLUTE EOS #: 0.3 K/UL (ref 0–0.4)
ABSOLUTE IMMATURE GRANULOCYTE: 0.89 K/UL (ref 0–0.3)
ABSOLUTE LYMPH #: 1.04 K/UL (ref 1–4.8)
ABSOLUTE MONO #: 1.19 K/UL (ref 0.2–0.8)
ANION GAP SERPL CALCULATED.3IONS-SCNC: 10 MMOL/L (ref 9–17)
BASOPHILS # BLD: 1 %
BASOPHILS ABSOLUTE: 0.15 K/UL (ref 0–0.2)
BUN BLDV-MCNC: 15 MG/DL (ref 8–23)
BUN/CREAT BLD: 15 (ref 9–20)
CALCIUM SERPL-MCNC: 8.2 MG/DL (ref 8.6–10.4)
CHLORIDE BLD-SCNC: 97 MMOL/L (ref 98–107)
CO2: 28 MMOL/L (ref 20–31)
CREAT SERPL-MCNC: 1.02 MG/DL (ref 0.5–0.9)
DIFFERENTIAL TYPE: ABNORMAL
EOSINOPHILS RELATIVE PERCENT: 2 % (ref 1–4)
GFR AFRICAN AMERICAN: >60 ML/MIN
GFR NON-AFRICAN AMERICAN: 53 ML/MIN
GFR SERPL CREATININE-BSD FRML MDRD: ABNORMAL ML/MIN/{1.73_M2}
GFR SERPL CREATININE-BSD FRML MDRD: ABNORMAL ML/MIN/{1.73_M2}
GLUCOSE BLD-MCNC: 117 MG/DL (ref 65–105)
GLUCOSE BLD-MCNC: 130 MG/DL (ref 65–105)
GLUCOSE BLD-MCNC: 152 MG/DL (ref 65–105)
GLUCOSE BLD-MCNC: 155 MG/DL (ref 65–105)
GLUCOSE BLD-MCNC: 157 MG/DL (ref 70–99)
GLUCOSE BLD-MCNC: 162 MG/DL (ref 65–105)
HCT VFR BLD CALC: 29.4 % (ref 36.3–47.1)
HEMOGLOBIN: 9.3 G/DL (ref 11.9–15.1)
IMMATURE GRANULOCYTES: 6 %
LYMPHOCYTES # BLD: 7 % (ref 24–44)
MAGNESIUM: 2 MG/DL (ref 1.6–2.6)
MCH RBC QN AUTO: 30.6 PG (ref 25.2–33.5)
MCHC RBC AUTO-ENTMCNC: 31.6 G/DL (ref 28.4–34.8)
MCV RBC AUTO: 96.7 FL (ref 82.6–102.9)
MONOCYTES # BLD: 8 % (ref 1–7)
NRBC AUTOMATED: 0 PER 100 WBC
PDW BLD-RTO: 14.9 % (ref 11.8–14.4)
PHOSPHORUS: 3 MG/DL (ref 2.6–4.5)
PLATELET # BLD: 342 K/UL (ref 138–453)
PLATELET ESTIMATE: ABNORMAL
PMV BLD AUTO: 10.4 FL (ref 8.1–13.5)
POTASSIUM SERPL-SCNC: 4.1 MMOL/L (ref 3.7–5.3)
RBC # BLD: 3.04 M/UL (ref 3.95–5.11)
RBC # BLD: ABNORMAL 10*6/UL
SEG NEUTROPHILS: 76 % (ref 36–66)
SEGMENTED NEUTROPHILS ABSOLUTE COUNT: 11.33 K/UL (ref 1.8–7.7)
SODIUM BLD-SCNC: 135 MMOL/L (ref 135–144)
WBC # BLD: 14.9 K/UL (ref 3.5–11.3)
WBC # BLD: ABNORMAL 10*3/UL

## 2021-03-07 PROCEDURE — 6370000000 HC RX 637 (ALT 250 FOR IP): Performed by: COLON & RECTAL SURGERY

## 2021-03-07 PROCEDURE — 97116 GAIT TRAINING THERAPY: CPT

## 2021-03-07 PROCEDURE — 6360000002 HC RX W HCPCS: Performed by: STUDENT IN AN ORGANIZED HEALTH CARE EDUCATION/TRAINING PROGRAM

## 2021-03-07 PROCEDURE — 2580000003 HC RX 258: Performed by: STUDENT IN AN ORGANIZED HEALTH CARE EDUCATION/TRAINING PROGRAM

## 2021-03-07 PROCEDURE — 6370000000 HC RX 637 (ALT 250 FOR IP): Performed by: INTERNAL MEDICINE

## 2021-03-07 PROCEDURE — 6360000002 HC RX W HCPCS: Performed by: INTERNAL MEDICINE

## 2021-03-07 PROCEDURE — 97110 THERAPEUTIC EXERCISES: CPT

## 2021-03-07 PROCEDURE — 36415 COLL VENOUS BLD VENIPUNCTURE: CPT

## 2021-03-07 PROCEDURE — 82947 ASSAY GLUCOSE BLOOD QUANT: CPT

## 2021-03-07 PROCEDURE — 94760 N-INVAS EAR/PLS OXIMETRY 1: CPT

## 2021-03-07 PROCEDURE — 2500000003 HC RX 250 WO HCPCS: Performed by: COLON & RECTAL SURGERY

## 2021-03-07 PROCEDURE — 6370000000 HC RX 637 (ALT 250 FOR IP): Performed by: STUDENT IN AN ORGANIZED HEALTH CARE EDUCATION/TRAINING PROGRAM

## 2021-03-07 PROCEDURE — 71045 X-RAY EXAM CHEST 1 VIEW: CPT

## 2021-03-07 PROCEDURE — 85025 COMPLETE CBC W/AUTO DIFF WBC: CPT

## 2021-03-07 PROCEDURE — C9113 INJ PANTOPRAZOLE SODIUM, VIA: HCPCS | Performed by: INTERNAL MEDICINE

## 2021-03-07 PROCEDURE — 94640 AIRWAY INHALATION TREATMENT: CPT

## 2021-03-07 PROCEDURE — 80048 BASIC METABOLIC PNL TOTAL CA: CPT

## 2021-03-07 PROCEDURE — 83735 ASSAY OF MAGNESIUM: CPT

## 2021-03-07 PROCEDURE — 2060000000 HC ICU INTERMEDIATE R&B

## 2021-03-07 PROCEDURE — 2700000000 HC OXYGEN THERAPY PER DAY

## 2021-03-07 PROCEDURE — 84100 ASSAY OF PHOSPHORUS: CPT

## 2021-03-07 RX ORDER — HYDROCODONE BITARTRATE AND ACETAMINOPHEN 5; 325 MG/1; MG/1
1 TABLET ORAL EVERY 8 HOURS PRN
Status: DISCONTINUED | OUTPATIENT
Start: 2021-03-07 | End: 2021-03-09

## 2021-03-07 RX ADMIN — METOPROLOL TARTRATE 25 MG: 25 TABLET, FILM COATED ORAL at 21:11

## 2021-03-07 RX ADMIN — HEPARIN SODIUM 5000 UNITS: 5000 INJECTION INTRAVENOUS; SUBCUTANEOUS at 21:11

## 2021-03-07 RX ADMIN — PHENOL 1 SPRAY: 1.5 LIQUID ORAL at 11:55

## 2021-03-07 RX ADMIN — PANTOPRAZOLE SODIUM 40 MG: 40 INJECTION, POWDER, FOR SOLUTION INTRAVENOUS at 08:32

## 2021-03-07 RX ADMIN — FLUTICASONE PROPIONATE 1 SPRAY: 50 SPRAY, METERED NASAL at 08:32

## 2021-03-07 RX ADMIN — SPIRONOLACTONE 25 MG: 25 TABLET ORAL at 08:32

## 2021-03-07 RX ADMIN — PHENOL 1 SPRAY: 1.5 LIQUID ORAL at 15:01

## 2021-03-07 RX ADMIN — SUCRALFATE 1 G: 1 TABLET ORAL at 11:55

## 2021-03-07 RX ADMIN — BUPROPION HYDROCHLORIDE 150 MG: 150 TABLET, EXTENDED RELEASE ORAL at 08:32

## 2021-03-07 RX ADMIN — CIPROFLOXACIN 200 MG: 2 INJECTION, SOLUTION INTRAVENOUS at 22:06

## 2021-03-07 RX ADMIN — INSULIN LISPRO 1 UNITS: 100 INJECTION, SOLUTION INTRAVENOUS; SUBCUTANEOUS at 17:47

## 2021-03-07 RX ADMIN — INSULIN LISPRO 1 UNITS: 100 INJECTION, SOLUTION INTRAVENOUS; SUBCUTANEOUS at 06:06

## 2021-03-07 RX ADMIN — METOPROLOL TARTRATE 25 MG: 25 TABLET, FILM COATED ORAL at 08:32

## 2021-03-07 RX ADMIN — CALCIUM GLUCONATE: 94 INJECTION, SOLUTION INTRAVENOUS at 17:54

## 2021-03-07 RX ADMIN — SUCRALFATE 1 G: 1 TABLET ORAL at 17:47

## 2021-03-07 RX ADMIN — HEPARIN SODIUM 5000 UNITS: 5000 INJECTION INTRAVENOUS; SUBCUTANEOUS at 14:35

## 2021-03-07 RX ADMIN — CIPROFLOXACIN 200 MG: 2 INJECTION, SOLUTION INTRAVENOUS at 08:31

## 2021-03-07 RX ADMIN — CITALOPRAM HYDROBROMIDE 10 MG: 10 TABLET ORAL at 08:32

## 2021-03-07 RX ADMIN — ACETAMINOPHEN 650 MG: 325 TABLET ORAL at 14:55

## 2021-03-07 RX ADMIN — HEPARIN SODIUM 5000 UNITS: 5000 INJECTION INTRAVENOUS; SUBCUTANEOUS at 06:06

## 2021-03-07 RX ADMIN — BUDESONIDE AND FORMOTEROL FUMARATE DIHYDRATE 2 PUFF: 160; 4.5 AEROSOL RESPIRATORY (INHALATION) at 21:04

## 2021-03-07 RX ADMIN — ATORVASTATIN CALCIUM 20 MG: 20 TABLET, FILM COATED ORAL at 08:32

## 2021-03-07 RX ADMIN — HYDROCODONE BITARTRATE AND ACETAMINOPHEN 1 TABLET: 5; 325 TABLET ORAL at 18:39

## 2021-03-07 RX ADMIN — BUDESONIDE AND FORMOTEROL FUMARATE DIHYDRATE 2 PUFF: 160; 4.5 AEROSOL RESPIRATORY (INHALATION) at 09:43

## 2021-03-07 RX ADMIN — I.V. FAT EMULSION 100 ML: 20 EMULSION INTRAVENOUS at 17:53

## 2021-03-07 RX ADMIN — GUAIFENESIN 600 MG: 600 TABLET, EXTENDED RELEASE ORAL at 21:11

## 2021-03-07 RX ADMIN — SODIUM CHLORIDE, PRESERVATIVE FREE 10 ML: 5 INJECTION INTRAVENOUS at 08:32

## 2021-03-07 RX ADMIN — SUCRALFATE 1 G: 1 TABLET ORAL at 06:06

## 2021-03-07 RX ADMIN — GUAIFENESIN 600 MG: 600 TABLET, EXTENDED RELEASE ORAL at 08:32

## 2021-03-07 RX ADMIN — TORSEMIDE 20 MG: 20 TABLET ORAL at 08:32

## 2021-03-07 RX ADMIN — HYDROCODONE BITARTRATE AND ACETAMINOPHEN 1 TABLET: 5; 325 TABLET ORAL at 06:06

## 2021-03-07 RX ADMIN — PHENOL 1 SPRAY: 1.5 LIQUID ORAL at 18:39

## 2021-03-07 ASSESSMENT — PAIN SCALES - GENERAL
PAINLEVEL_OUTOF10: 5
PAINLEVEL_OUTOF10: 0
PAINLEVEL_OUTOF10: 2
PAINLEVEL_OUTOF10: 8
PAINLEVEL_OUTOF10: 3

## 2021-03-07 NOTE — PROGRESS NOTES
Colorectal Surgery Progress Note    Patient Name: Augusta Moreira  Patient MRN: [id number]  Date: 3/7/2021; 5:13 AM    CC:nausea improving     Subjective:   No acute events overnight. NGT remains, minimal output. Pain controlled. Up oob to chair. Afebrile. Continued ostomy output and adequate uop. Objective:    Vitals:    03/07/21 0409   BP: 134/75   Pulse: 88   Resp: 18   Temp: 99.9 °F (37.7 °C)   SpO2: 98%        Gen: alert, awake, NAD  HEENT: moist mucous membranes, no scleral icterus  CV: s1+s2  Lung: equal and symmetric chest rise/fall, non-labored   Abdomen: soft, nd, attp. Incision slight erythema surroudning no induration, few staples removed, serous fluid drained no opaque/purulent/milky fluid seen. Stoma pink with flatus and stool in appliance   Extremities: no cyanosis or clubbing    I/O last 3 completed shifts: In: 61 [P.O.:60]  Out: 3550 [Urine:3250; Stool:300]    CBC:   Lab Results   Component Value Date    WBC 11.9 03/06/2021    RBC 2.95 03/06/2021    HGB 8.6 03/06/2021    HCT 29.1 03/06/2021    MCV 98.6 03/06/2021    MCH 29.2 03/06/2021    MCHC 29.6 03/06/2021    RDW 14.6 03/06/2021     03/06/2021    MPV 9.9 03/06/2021     BMP:    Lab Results   Component Value Date     03/06/2021    K 3.8 03/06/2021     03/06/2021    CO2 28 03/06/2021    BUN 11 03/06/2021    LABALBU 2.5 03/06/2021    CREATININE 0.91 03/06/2021    CALCIUM 8.2 03/06/2021    GFRAA >60 03/06/2021    LABGLOM >60 03/06/2021    GLUCOSE 130 03/06/2021       Pathology (2/25/21):    Source of Specimen   1: SIGMOID AND UPPER RECTUM     SIGMOID COLON AND UPPER RECTUM, SEGMENTAL RESECTION:             -  PERFORATED DIVERTICULITIS WITH SEROSAL ADHESIONS AND   ADJACENT MURAL ABSCESS.        -  MARGINS APPEAR VIABLE.        -  BENIGN REACTIVE MESENTERIC LYMPH NODES. Gross Description   \"NOE LUIS ANTONIO, SIGMOID AND UPPER RECTUM\" 15.0 cm long x 4.0 cm in   circumference segment of colon with a large amount of mesenteric fat.    The serosa is pink-tan with adhesions and in an area of dense   adhesions, there is a 0.2 cm transmural defect that is 4.0 cm from the   closest margin.  The mucosa is pink-tan with diverticula up to 1.5 cm   and in the center of the segment, away from the aforementioned defect,   there is a region of cavitation within the wall and fat consistent   with abscess. Lenetta Cave are no masses.  The wall ranges in thickness from   0.2 to 0.8 cm with no marked stricture.  Within the fat there are a   few rubbery nodes up to 0.4 cm.  Cassette summary:  \"A\" resection   margins shave, \"B-C\" transmural defect, \"D\" separate abscess and   representative nodes.  tm       Xr Abdomen (kub) (single Ap View)  Result Date: 3/5/2021  Multiple dilated gas-filled small bowel loops with scattered areas of gas in the colon. There are more dilated loops appreciated compared to prior. Differential remains ileus versus obstruction although the progression is more worrisome for obstruction. Xr Chest Portable  Result Date: 3/4/2021  1. Enlarged cardiac silhouette with prominence of the pulmonary vasculature. 2. Minimal bibasilar opacification, which is unchanged. 3. No large pleural effusions. Assessment/Plan:     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 Seroma - no signs of wound infection     1. Continue TPN  2. Continue NGT clamp, start cld if tolerating clears around NGT plant to remove NGT at 12p. If pt get nauseous place NGT back to LIWS, make NPO and continue NGT. 3. IVF - total IV 125cc/h  4. dvt ppx: hep  5. Appreciate IM assistance with medical management. 6. Wound care: continue daily packing changes. Ok to shower         I Dr. Flip Tinoco saw and examined the patient. I have edited the above and agree with the above. Nehal Ricks  Colorectal Surgery

## 2021-03-07 NOTE — PROGRESS NOTES
Nutrition Assessment     Type and Reason for Visit: Reassess    Nutrition Recommendations/Plan:   1. Continue DIET CLEAR LIQUID; No Added Salt (3-4 GM); Daily Fluid Restriction: 1800 ml  2. Continue Central Clinimix 5/20 TPN at 1560 mL total volume and 100 ml lipids (1573 kcal and 78 gm of protein)  3. Monitor p.o intakes, diet tolerance/ advancement, labs, GI status and TPN rate and tolerance  4. Continue TPN until regular diet is tolerated    Nutrition Assessment:  Patient diet advanced to Clear Liquid this morning. Continue TPN rate ate goal rate 65 ml/hr and 100 mL of lipids. NG tube is clamped and if Clear liquid diet is tolerated then NG tube will be discontinued. Monitor labs, p.o intakes, tolerance/ diet advancement, TPN tolerance and GI status. Current plan is to continue TPN until regular diet is tolerated. Malnutrition Assessment:  Malnutrition Status: Mild malnutrition    Estimated Daily Nutrient Needs:  Energy (kcal): 1699-0486 (9-11 kcal/kg); Weight Used for Energy Requirements:  Current     Protein (g): 100-110 (2.0-2.2 g/kg IBW); Weight Used for Protein Requirements:  Ideal          Nutrition Related Findings: Trace BLE edema. Present bowel sounds. S/P exploratory laparotomy, takedown of colovesical fistula secondary to diverticular disease, rectosigmoid colonic resection, end colostomy 2/26      Current Nutrition Therapies:    PN-Adult 2-in-1 Central Line (Custom)  DIET CLEAR LIQUID; No Added Salt (3-4 GM);  Daily Fluid Restriction: 1800 ml    Anthropometric Measures:  · Height: 5' 2\" (157.5 cm)  · Current Body Wt: 302 lb (137 kg)   · BMI: 55.2    Nutrition Diagnosis:   · Mild malnutrition, In context of acute illness or injury related to altered GI function as evidenced by mild loss of subcutaneous fat, intake 0-25%      Nutrition Interventions:   Food and/or Nutrient Delivery:  Continue Current Diet, Modify Parenteral Nutrition  Nutrition Education/Counseling:  Education not indicated Coordination of Nutrition Care:  Continue to monitor while inpatient    Goals:  PN will meet greater than 75% of estimated nutrition needs       Nutrition Monitoring and Evaluation:   Behavioral-Environmental Outcomes:  None Identified   Food/Nutrient Intake Outcomes:  Parenteral Nutrition Intake/Tolerance, Diet Advancement/Tolerance, Food and Nutrient Intake  Physical Signs/Symptoms Outcomes:  Biochemical Data, Fluid Status or Edema, Skin, Weight, Nausea or Vomiting, GI Status     Discharge Planning:     Too soon to determine         Corina PLEITEZN, RDN, LDN  Lead Clinical Dietitian  RD Office Phone [phone]

## 2021-03-07 NOTE — PLAN OF CARE
Problem: Pain:  Goal: Control of acute pain  Description: Control of acute pain  Outcome: Ongoing  Note: Pain assessment completed. Patient demonstrates understanding of pain rating scale and interventions. At this time patient states pain is a 2/10. Problem: Falls - Risk of:  Goal: Will remain free from falls  Description: Will remain free from falls  Outcome: Ongoing  Note: Fall risk assessment completed. Patient instructed to use call light. Bed locked and in lowest position, side rails up 2/4, call light and bedside table within reach, clutter removed, and non-skid footwear on when pt out of bed. Bed alarm on. Hourly rounds will continue. Problem: Venous Thromboembolism:  Goal: Will show no signs or symptoms of venous thromboembolism  Description: Will show no signs or symptoms of venous thromboembolism  Outcome: Ongoing  Note: Vascular checks completed. Patient denies calf pain or tenderness. No redness. Pedal pulses palpable.

## 2021-03-07 NOTE — PROGRESS NOTES
 Asthma    Asthma with acute exacerbation    Atopic rhinitis    CHF (congestive heart failure) (HCC)    Chronic bilateral low back pain without sciatica    Closed fracture of proximal end of humerus    Depression    Diverticulitis    Diverticulosis of large intestine    Diverticulosis of large intestine without hemorrhage    Essential hypertension    H/O hysterectomy for benign disease    Heart murmur    Hyperlipidemia    Hypothermia due to anesthesia    Laceration of chest wall    Mitral incompetence    Morbid obesity (HCC)    Non-rheumatic mitral regurgitation    Polyp of sigmoid colon    Postmenopausal status    Urinary incontinence    S/P colectomy     Acute on chronic respiratory failure managed by primary pulmonary  Morbid obesity excess calories  JAYDEN on CPAP   ckd2  Acute blood loss anemia  Acute on chronic diastolic CHF compensated  Postoperative paralytic ileus  Hypomagnesemia  Hyperglycemia secondary to TPN  Possible UTI await culture continue with IV Cipro her lactic acid and procalcitonin do not go along with sepsis  Plan:    Meds labs reviewed we will check chest x-ray due to the cough continue with DVT prophylaxis check blood sugars every 4-6 hours with insulin coverage as needed  Continue with TPN continue with physical therapy occupational therapy  Elvira Bhatia MD  4:47 PM

## 2021-03-07 NOTE — PLAN OF CARE
Nutrition Problem #1: Mild malnutrition, In context of acute illness or injury  Intervention: Food and/or Nutrient Delivery: Continue Current Diet, Modify Parenteral Nutrition  Nutritional Goals: PN will meet greater than 75% of estimated nutrition needs

## 2021-03-07 NOTE — PROGRESS NOTES
Physical Therapy  Facility/Department: [facility]  Daily Treatment Note  NAME: Cherry Dobbs  : 1945  MRN: [id number]    Date of Service: 3/7/2021    Discharge Recommendations:  Home with assist PRN, Home with Home health PT   PT Equipment Recommendations  Equipment Needed: Yes  Walker: Rolling    Assessment   Body structures, Functions, Activity limitations: Decreased functional mobility ; Decreased endurance;Decreased strength;Decreased balance;Decreased safe awareness; Increased pain  Assessment: Patient with deficits in mobility, endurance, limitied by pain, requires increased time to complete tasks due to decreased tolerance of activities. Patient would benefit from continued skilled PT to promote strength, gait, balance and safety. Prognosis: Excellent  Decision Making: High Complexity  Exam: ROM, MMT, balance, endurance, AM-PAC  Clinical Presentation: unstable  PT Education: Transfer Training;Energy Conservation; Functional Mobility Training;General Safety;Home Exercise Program;Gait Training  Patient Education: Patient educated on the importance of continued PT to maximize functional mobility and independence in mobility tasks. REQUIRES PT FOLLOW UP: Yes  Activity Tolerance  Activity Tolerance: Patient limited by fatigue;Patient limited by endurance  Activity Tolerance: Patient unable to increase ambulation due to pain and fatigue. Patient retires to recliner upon completion of treatment. RN [facility] notified. Patient Diagnosis(es): There were no encounter diagnoses.      has a past medical history of Acute respiratory failure following trauma and surgery ([address] Adiposity, Arthralgia of multiple joints, Arthritis, Asthma, Asthma with acute exacerbation, Atopic rhinitis, CAD (coronary artery disease), CHF (congestive heart failure) ([facility]), Chronic bilateral low back pain without sciatica, Closed fracture of proximal end of humerus, COPD (chronic obstructive pulmonary disease) ([address] Depression, Diverticulitis, Diverticulosis of large intestine, Diverticulosis of large intestine without hemorrhage, Encounter for mammogram to establish baseline mammogram, Essential hypertension, H/O hysterectomy for benign disease, Heart murmur, History of blood transfusion, Hyperlipidemia, Hypothermia due to anesthesia, Laceration of chest wall, Mitral incompetence, Morbid obesity ([address] Non-rheumatic mitral regurgitation, Polyp of sigmoid colon, Postmenopausal status, Routine general medical examination at a health care facility, Special screening for malignant neoplasms, colon, Special screening for malignant neoplasms, vagina, and Urinary incontinence. has a past surgical history that includes Appendectomy; Mitral valve repair; Hysterectomy; bronchoscopy; Cardiac catheterization (03/22/2017); Cardiac catheterization (11/18*/2016); Wound Exploration; other surgical history; Ovary removal; eye surgery (Bilateral); Colonoscopy; Endoscopy, colon, diagnostic; fracture surgery; and Small intestine surgery (N/A, 2/25/2021). Restrictions  Restrictions/Precautions  Restrictions/Precautions: General Precautions, Surgical Protocols, Fall Risk, Up as Tolerated  Required Braces or Orthoses?: No  Position Activity Restriction  Other position/activity restrictions: 2L O2, RUE IV, incision on stomach region with dressing, colostomy, garrido cath, ambulate pt, telemetry  Subjective   General  Chart Reviewed: Yes  Response To Previous Treatment: Patient with no complaints from previous session. Subjective  Subjective: Pt alert and agreeable to PT treatment. General Comment  Comments: [facility]  reports pt medically stable for PT treatment.   Pain Screening  Patient Currently in Pain: Yes  Vital Signs  Patient Currently in Pain: Yes       Orientation  Orientation  Overall Orientation Status: Within Functional Limits  Cognition      Objective   Bed mobility  Bridging: Minimal assistance  Rolling to Left: Contact guard assistance  Rolling to Right: Contact guard assistance  Supine to Sit: Minimal assistance  Sit to Supine: Minimal assistance  Scooting: Contact guard assistance  Comment: Patient up in bed upon arrival.  Patient requires vc for positioning and sequencing to complete EOB seated posture. Transfers  Sit to Stand: Contact guard assistance;Minimal Assistance  Stand to sit: Contact guard assistance;Minimal Assistance  Bed to Chair: Contact guard assistance;Minimal assistance  Stand Pivot Transfers: Contact guard assistance;Minimal Assistance  Lateral Transfers: Contact guard assistance;Minimal Assistance  Comment: Patient requires encouragement and vc's for hand placement and sequencing with RW. Patient requires increased Assist for line management. Patient with good safety awareness and improved static postural alignment  Ambulation  Ambulation?: Yes  More Ambulation?: No  Ambulation 1  Surface: level tile  Device: Rolling Walker  Other Apparatus: O2  Assistance: Contact guard assistance;Minimal assistance  Quality of Gait: Patient with slow careful gait pattern. Step to sequencing with decreased toe clearance. Gait Deviations: Slow Teena; Increased DAYSI; Decreased step length;Decreased step height  Distance: 5 feet x1  Comments: Patient limited by telemtry and endurance. Requires increased time for Assist for line management to promote safe gait activities. Neuromuscular Education  NDT Treatment: Sitting;Standing;Gait   Neuromuscular Comments: Patient with standing static balance for safety in mobility tasks. Balance  Posture: Fair  Sitting - Static: Good  Sitting - Dynamic: Fair;+  Standing - Static: Fair  Standing - Dynamic: Fair  Exercises  Quad Sets: 10  Gluteal Sets: 10  Hip Abduction: 10  Knee Long Arc Quad: 10  Ankle Pumps: 10  Comments: Pt performed supine VIRGILIO LE exercises x 10 reps. Patient requires increased time to complete all exercises. Patient given vc's for positioning and sequencing.      AM-PAC Score  AM-PAC Inpatient Mobility Raw Score : 15 (03/07/21 1121)  AM-PAC Inpatient T-Scale Score : 39.45 (03/07/21 1121)  Mobility Inpatient CMS 0-100% Score: 57.7 (03/07/21 1121)  Mobility Inpatient CMS G-Code Modifier : CK (03/07/21 1121)          Goals  Short term goals  Time Frame for Short term goals: 12 visits  Short term goal 1: Pt to be indep with all bed mobility, demo'ing log rolling  Short term goal 2: Pt to be indep with all functional transfers  Short term goal 3: Pt will ambulate 200ft with RW/LRAD and SBA  Short term goal 4: Pt will tolerate 25mins of PT including therex, theract, gait training and neuro re-ed to improve functional mobiilty and activity tolerance. Patient Goals   Patient goals : To feel better    Plan    Plan  Times per week: 1-2x day / 5-6 days per week  Current Treatment Recommendations: Strengthening, Transfer Training, Endurance Training, Neuromuscular Re-education, Patient/Caregiver Education & Training, Balance Training, Gait Training, Home Exercise Program, Functional Mobility Training, Safety Education & Training, Positioning  Safety Devices  Type of devices:  All fall risk precautions in place, Call light within reach, Left in chair, Gait belt  Restraints  Initially in place: No     Therapy Time   Individual Concurrent Group Co-treatment   Time In 1035         Time Out 1100         Min[place] [address] [place]

## 2021-03-07 NOTE — PROGRESS NOTES
Pulmonary Critical Care Progress Note  Kirt Sever, MD     Patient seen for the follow up of acute respiratory insufficiency, CHF, asthma, JAYDEN    Subjective:  No significant overnight events noted. She is resting comfortably in bed. She did not wear her CPAP machine last night secondary to her NG tube. Shortness of breath is mild in nature. She denies any chest pain, has occasional productive cough. She does have some output from her colostomy. Examination:  Vitals: BP (!) 133/54   Pulse 76   Temp 98.4 °F (36.9 °C) (Oral)   Resp 18   Ht 5' 2\" (1.575 m)   Wt (!) 302 lb 4 oz (137.1 kg)   SpO2 96%   BMI 55.28 kg/m²   General appearance: alert and cooperative with exam, resting in bed  Neck: No JVD  Lungs: Moderate air exchange, no wheezing  Heart: regular rate and rhythm, S1, S2 normal, no gallop  Abdomen: Soft, non tender, + BS  Extremities: no cyanosis or clubbing. No significant edema    LABs:  CBC:   Recent Labs     03/06/21  0630 03/07/21  0525   WBC 11.9* 14.9*   HGB 8.6* 9.3*   HCT 29.1* 29.4*    342     BMP:   Recent Labs     03/06/21  0630 03/07/21  0525    135   K 3.8 4.1   CO2 28 28   BUN 11 15   CREATININE 0.91* 1.02*   LABGLOM >60 53*   GLUCOSE 130* 157*     Radiology:  X-ray chest:  3/5/2021      KUB:  3/5/2021  Multiple dilated gas-filled small bowel loops with scattered areas of gas in   the colon.  There are more dilated loops appreciated compared to prior.    Differential remains ileus versus obstruction although the progression is   more worrisome for obstruction       Impression:  · Acute on chronic hypoxic respiratory insufficiency, on nocturnal oxygen  · Mild pulmonary edema  · Acute on chronic diastolic HF  · History of asthma  · Obstructive sleep apnea/morbid obesity, on CPAP therapy  · Status post exploratory laparotomy, sigmoid/superior rectum resection and creation of end colostomy and lysis of adhesions with mobilization of splenic flexure 02/25/2021  · SHARYN on

## 2021-03-08 LAB
ABSOLUTE EOS #: 0.28 K/UL (ref 0–0.4)
ABSOLUTE IMMATURE GRANULOCYTE: 0.99 K/UL (ref 0–0.3)
ABSOLUTE LYMPH #: 0.99 K/UL (ref 1–4.8)
ABSOLUTE MONO #: 1.14 K/UL (ref 0.2–0.8)
ANION GAP SERPL CALCULATED.3IONS-SCNC: 10 MMOL/L (ref 9–17)
BASOPHILS # BLD: 1 %
BASOPHILS ABSOLUTE: 0.14 K/UL (ref 0–0.2)
BUN BLDV-MCNC: 21 MG/DL (ref 8–23)
BUN/CREAT BLD: 23 (ref 9–20)
CALCIUM SERPL-MCNC: 8.3 MG/DL (ref 8.6–10.4)
CHLORIDE BLD-SCNC: 98 MMOL/L (ref 98–107)
CO2: 27 MMOL/L (ref 20–31)
CREAT SERPL-MCNC: 0.92 MG/DL (ref 0.5–0.9)
CULTURE: ABNORMAL
DIFFERENTIAL TYPE: ABNORMAL
EOSINOPHILS RELATIVE PERCENT: 2 % (ref 1–4)
GFR AFRICAN AMERICAN: >60 ML/MIN
GFR NON-AFRICAN AMERICAN: 59 ML/MIN
GFR SERPL CREATININE-BSD FRML MDRD: ABNORMAL ML/MIN/{1.73_M2}
GFR SERPL CREATININE-BSD FRML MDRD: ABNORMAL ML/MIN/{1.73_M2}
GLUCOSE BLD-MCNC: 134 MG/DL (ref 65–105)
GLUCOSE BLD-MCNC: 147 MG/DL (ref 65–105)
GLUCOSE BLD-MCNC: 165 MG/DL (ref 65–105)
GLUCOSE BLD-MCNC: 169 MG/DL (ref 70–99)
GLUCOSE BLD-MCNC: 171 MG/DL (ref 65–105)
HCT VFR BLD CALC: 29.4 % (ref 36.3–47.1)
HEMOGLOBIN: 8.6 G/DL (ref 11.9–15.1)
IMMATURE GRANULOCYTES: 7 %
LACTIC ACID: 1.7 MMOL/L (ref 0.5–2.2)
LYMPHOCYTES # BLD: 7 % (ref 24–44)
Lab: ABNORMAL
MAGNESIUM: 2.1 MG/DL (ref 1.6–2.6)
MCH RBC QN AUTO: 28.8 PG (ref 25.2–33.5)
MCHC RBC AUTO-ENTMCNC: 29.3 G/DL (ref 28.4–34.8)
MCV RBC AUTO: 98.3 FL (ref 82.6–102.9)
MONOCYTES # BLD: 8 % (ref 1–7)
NRBC AUTOMATED: 0 PER 100 WBC
PDW BLD-RTO: 14.9 % (ref 11.8–14.4)
PHOSPHORUS: 3.3 MG/DL (ref 2.6–4.5)
PLATELET # BLD: 280 K/UL (ref 138–453)
PLATELET ESTIMATE: ABNORMAL
PMV BLD AUTO: 9.9 FL (ref 8.1–13.5)
POTASSIUM SERPL-SCNC: 3.8 MMOL/L (ref 3.7–5.3)
RBC # BLD: 2.99 M/UL (ref 3.95–5.11)
RBC # BLD: ABNORMAL 10*6/UL
SEDIMENTATION RATE, ERYTHROCYTE: 102 MM (ref 0–30)
SEG NEUTROPHILS: 75 % (ref 36–66)
SEGMENTED NEUTROPHILS ABSOLUTE COUNT: 10.66 K/UL (ref 1.8–7.7)
SODIUM BLD-SCNC: 135 MMOL/L (ref 135–144)
SPECIMEN DESCRIPTION: ABNORMAL
WBC # BLD: 14.2 K/UL (ref 3.5–11.3)
WBC # BLD: ABNORMAL 10*3/UL

## 2021-03-08 PROCEDURE — 83605 ASSAY OF LACTIC ACID: CPT

## 2021-03-08 PROCEDURE — 86140 C-REACTIVE PROTEIN: CPT

## 2021-03-08 PROCEDURE — 6370000000 HC RX 637 (ALT 250 FOR IP): Performed by: STUDENT IN AN ORGANIZED HEALTH CARE EDUCATION/TRAINING PROGRAM

## 2021-03-08 PROCEDURE — 2500000003 HC RX 250 WO HCPCS: Performed by: STUDENT IN AN ORGANIZED HEALTH CARE EDUCATION/TRAINING PROGRAM

## 2021-03-08 PROCEDURE — 36415 COLL VENOUS BLD VENIPUNCTURE: CPT

## 2021-03-08 PROCEDURE — 84100 ASSAY OF PHOSPHORUS: CPT

## 2021-03-08 PROCEDURE — 80048 BASIC METABOLIC PNL TOTAL CA: CPT

## 2021-03-08 PROCEDURE — 83735 ASSAY OF MAGNESIUM: CPT

## 2021-03-08 PROCEDURE — 2060000000 HC ICU INTERMEDIATE R&B

## 2021-03-08 PROCEDURE — 82947 ASSAY GLUCOSE BLOOD QUANT: CPT

## 2021-03-08 PROCEDURE — 6360000002 HC RX W HCPCS: Performed by: STUDENT IN AN ORGANIZED HEALTH CARE EDUCATION/TRAINING PROGRAM

## 2021-03-08 PROCEDURE — 2500000003 HC RX 250 WO HCPCS: Performed by: COLON & RECTAL SURGERY

## 2021-03-08 PROCEDURE — 94669 MECHANICAL CHEST WALL OSCILL: CPT

## 2021-03-08 PROCEDURE — 85652 RBC SED RATE AUTOMATED: CPT

## 2021-03-08 PROCEDURE — 2700000000 HC OXYGEN THERAPY PER DAY

## 2021-03-08 PROCEDURE — 6360000002 HC RX W HCPCS: Performed by: NURSE PRACTITIONER

## 2021-03-08 PROCEDURE — 97110 THERAPEUTIC EXERCISES: CPT

## 2021-03-08 PROCEDURE — 6370000000 HC RX 637 (ALT 250 FOR IP): Performed by: COLON & RECTAL SURGERY

## 2021-03-08 PROCEDURE — 6370000000 HC RX 637 (ALT 250 FOR IP): Performed by: INTERNAL MEDICINE

## 2021-03-08 PROCEDURE — 94761 N-INVAS EAR/PLS OXIMETRY MLT: CPT

## 2021-03-08 PROCEDURE — 94640 AIRWAY INHALATION TREATMENT: CPT

## 2021-03-08 PROCEDURE — 93005 ELECTROCARDIOGRAM TRACING: CPT | Performed by: INTERNAL MEDICINE

## 2021-03-08 PROCEDURE — C9113 INJ PANTOPRAZOLE SODIUM, VIA: HCPCS | Performed by: INTERNAL MEDICINE

## 2021-03-08 PROCEDURE — 6360000002 HC RX W HCPCS: Performed by: INTERNAL MEDICINE

## 2021-03-08 PROCEDURE — 85025 COMPLETE CBC W/AUTO DIFF WBC: CPT

## 2021-03-08 PROCEDURE — 97116 GAIT TRAINING THERAPY: CPT

## 2021-03-08 RX ORDER — ALBUTEROL SULFATE 2.5 MG/3ML
2.5 SOLUTION RESPIRATORY (INHALATION) 4 TIMES DAILY
Status: DISCONTINUED | OUTPATIENT
Start: 2021-03-08 | End: 2021-03-12 | Stop reason: HOSPADM

## 2021-03-08 RX ORDER — FUROSEMIDE 10 MG/ML
20 INJECTION INTRAMUSCULAR; INTRAVENOUS ONCE
Status: COMPLETED | OUTPATIENT
Start: 2021-03-08 | End: 2021-03-08

## 2021-03-08 RX ORDER — ACETYLCYSTEINE 200 MG/ML
400 SOLUTION ORAL; RESPIRATORY (INHALATION) 3 TIMES DAILY
Status: DISCONTINUED | OUTPATIENT
Start: 2021-03-08 | End: 2021-03-12

## 2021-03-08 RX ORDER — AMIODARONE HYDROCHLORIDE 100 MG/1
100 TABLET ORAL DAILY
Status: DISCONTINUED | OUTPATIENT
Start: 2021-03-09 | End: 2021-03-09

## 2021-03-08 RX ADMIN — INSULIN LISPRO 1 UNITS: 100 INJECTION, SOLUTION INTRAVENOUS; SUBCUTANEOUS at 12:20

## 2021-03-08 RX ADMIN — ACETYLCYSTEINE 400 MG: 200 SOLUTION ORAL; RESPIRATORY (INHALATION) at 19:45

## 2021-03-08 RX ADMIN — INSULIN LISPRO 1 UNITS: 100 INJECTION, SOLUTION INTRAVENOUS; SUBCUTANEOUS at 09:09

## 2021-03-08 RX ADMIN — BUDESONIDE AND FORMOTEROL FUMARATE DIHYDRATE 2 PUFF: 160; 4.5 AEROSOL RESPIRATORY (INHALATION) at 19:45

## 2021-03-08 RX ADMIN — FUROSEMIDE 20 MG: 10 INJECTION, SOLUTION INTRAMUSCULAR; INTRAVENOUS at 20:55

## 2021-03-08 RX ADMIN — SUCRALFATE 1 G: 1 TABLET ORAL at 18:00

## 2021-03-08 RX ADMIN — GUAIFENESIN 600 MG: 600 TABLET, EXTENDED RELEASE ORAL at 20:59

## 2021-03-08 RX ADMIN — FLUTICASONE PROPIONATE 1 SPRAY: 50 SPRAY, METERED NASAL at 09:06

## 2021-03-08 RX ADMIN — SPIRONOLACTONE 25 MG: 25 TABLET ORAL at 09:06

## 2021-03-08 RX ADMIN — PANTOPRAZOLE SODIUM 40 MG: 40 INJECTION, POWDER, FOR SOLUTION INTRAVENOUS at 09:05

## 2021-03-08 RX ADMIN — CIPROFLOXACIN 200 MG: 2 INJECTION, SOLUTION INTRAVENOUS at 20:54

## 2021-03-08 RX ADMIN — AMIODARONE HYDROCHLORIDE 100 MG: 200 TABLET ORAL at 09:05

## 2021-03-08 RX ADMIN — CITALOPRAM HYDROBROMIDE 10 MG: 10 TABLET ORAL at 09:06

## 2021-03-08 RX ADMIN — HEPARIN SODIUM 5000 UNITS: 5000 INJECTION INTRAVENOUS; SUBCUTANEOUS at 20:55

## 2021-03-08 RX ADMIN — CIPROFLOXACIN 200 MG: 2 INJECTION, SOLUTION INTRAVENOUS at 09:20

## 2021-03-08 RX ADMIN — METOPROLOL TARTRATE 25 MG: 25 TABLET, FILM COATED ORAL at 09:06

## 2021-03-08 RX ADMIN — PHENOL 1 SPRAY: 1.5 LIQUID ORAL at 09:19

## 2021-03-08 RX ADMIN — GUAIFENESIN 600 MG: 600 TABLET, EXTENDED RELEASE ORAL at 09:09

## 2021-03-08 RX ADMIN — ACETYLCYSTEINE 400 MG: 200 SOLUTION ORAL; RESPIRATORY (INHALATION) at 14:18

## 2021-03-08 RX ADMIN — SUCRALFATE 1 G: 1 TABLET ORAL at 12:19

## 2021-03-08 RX ADMIN — ACETAMINOPHEN 650 MG: 325 TABLET ORAL at 22:42

## 2021-03-08 RX ADMIN — I.V. FAT EMULSION 100 ML: 20 EMULSION INTRAVENOUS at 17:46

## 2021-03-08 RX ADMIN — SUCRALFATE 1 G: 1 TABLET ORAL at 05:49

## 2021-03-08 RX ADMIN — HEPARIN SODIUM 5000 UNITS: 5000 INJECTION INTRAVENOUS; SUBCUTANEOUS at 05:49

## 2021-03-08 RX ADMIN — TORSEMIDE 20 MG: 20 TABLET ORAL at 09:06

## 2021-03-08 RX ADMIN — CALCIUM GLUCONATE: 94 INJECTION, SOLUTION INTRAVENOUS at 17:47

## 2021-03-08 RX ADMIN — BUPROPION HYDROCHLORIDE 150 MG: 150 TABLET, EXTENDED RELEASE ORAL at 09:06

## 2021-03-08 RX ADMIN — HYDROCODONE BITARTRATE AND ACETAMINOPHEN 1 TABLET: 5; 325 TABLET ORAL at 02:56

## 2021-03-08 RX ADMIN — ATORVASTATIN CALCIUM 20 MG: 20 TABLET, FILM COATED ORAL at 09:05

## 2021-03-08 RX ADMIN — BUDESONIDE AND FORMOTEROL FUMARATE DIHYDRATE 2 PUFF: 160; 4.5 AEROSOL RESPIRATORY (INHALATION) at 09:25

## 2021-03-08 RX ADMIN — ALBUTEROL SULFATE 2.5 MG: 2.5 SOLUTION RESPIRATORY (INHALATION) at 14:18

## 2021-03-08 RX ADMIN — IPRATROPIUM BROMIDE AND ALBUTEROL SULFATE 1 AMPULE: .5; 3 SOLUTION RESPIRATORY (INHALATION) at 09:59

## 2021-03-08 RX ADMIN — ALBUTEROL SULFATE 2.5 MG: 2.5 SOLUTION RESPIRATORY (INHALATION) at 19:45

## 2021-03-08 ASSESSMENT — PAIN DESCRIPTION - DESCRIPTORS
DESCRIPTORS: DISCOMFORT;BURNING
DESCRIPTORS: DISCOMFORT;DULL

## 2021-03-08 ASSESSMENT — PAIN DESCRIPTION - PROGRESSION

## 2021-03-08 ASSESSMENT — PAIN DESCRIPTION - PAIN TYPE
TYPE: CHRONIC PAIN
TYPE: CHRONIC PAIN;SURGICAL PAIN

## 2021-03-08 ASSESSMENT — PAIN SCALES - GENERAL
PAINLEVEL_OUTOF10: 0
PAINLEVEL_OUTOF10: 5
PAINLEVEL_OUTOF10: 0
PAINLEVEL_OUTOF10: 4
PAINLEVEL_OUTOF10: 5
PAINLEVEL_OUTOF10: 5
PAINLEVEL_OUTOF10: 1

## 2021-03-08 ASSESSMENT — PAIN DESCRIPTION - ONSET
ONSET: GRADUAL
ONSET: GRADUAL

## 2021-03-08 ASSESSMENT — PAIN DESCRIPTION - FREQUENCY
FREQUENCY: INTERMITTENT
FREQUENCY: INTERMITTENT

## 2021-03-08 ASSESSMENT — PAIN DESCRIPTION - ORIENTATION: ORIENTATION: MID

## 2021-03-08 ASSESSMENT — PAIN DESCRIPTION - LOCATION
LOCATION: THROAT
LOCATION: ABDOMEN;CHEST

## 2021-03-08 ASSESSMENT — PAIN - FUNCTIONAL ASSESSMENT: PAIN_FUNCTIONAL_ASSESSMENT: PREVENTS OR INTERFERES SOME ACTIVE ACTIVITIES AND ADLS

## 2021-03-08 NOTE — PROGRESS NOTES
[facility]  [place] Note    Patient Name: Tamara Ivory  Patient MRN: [id number]  Date: 3/8/2021; 5:57 AM    CC: Nausea    Subjective:   Patient seen and chart reviewed. No acute events overnight. Afebrile, normotensive, normal sinus rhythm, and saturating >95% on 2 L NC. Voiding with good uop. Denies chest pain or shortness of breath. Denies nausea, vomiting, or diarrhea. Pain controlled. Ambulating. 100 cc stool charted over last 24 hours. Objective:    Vitals:    03/08/21 0406   BP: 84/65   Pulse: 77   Resp: 18   Temp: 98.6 °F (37 °C)   SpO2: 98%        Gen: alert, awake, NAD  HEENT: moist mucous membranes, no scleral icterus  CV: s1+s2  Lung: equal and symmetric chest rise/fall, non-labored   Abdomen: soft, nd, attp. Incision slight erythema surroudning no induration, few staples removed, serous fluid drained no opaque/purulent/milky fluid seen. Stoma pink with flatus and stool in appliance   Extremities: no cyanosis or clubbing    I/O last 3 completed shifts:   In: 924 [I.V.:120]  Out: 2425 [Urine:2325; Stool:100]    Labs:    CBC:   Lab Results   Component Value Date    WBC 14.9 03/07/2021    RBC 3.04 03/07/2021    HGB 9.3 03/07/2021    HCT 29.4 03/07/2021    MCV 96.7 03/07/2021    MCH 30.6 03/07/2021    MCHC 31.6 03/07/2021    RDW 14.9 03/07/2021     03/07/2021    MPV 10.4 03/07/2021     BMP:    Lab Results   Component Value Date     03/07/2021    K 4.1 03/07/2021    CL 97 03/07/2021    CO2 28 03/07/2021    BUN 15 03/07/2021    LABALBU 2.5 03/06/2021    CREATININE 1.02 03/07/2021    CALCIUM 8.2 03/07/2021    GFRAA >60 03/07/2021    LABGLOM 53 03/07/2021    GLUCOSE 157 03/07/2021       Pathology (2/25/21):    Source of Specimen   1: SIGMOID AND UPPER RECTUM     SIGMOID COLON AND UPPER RECTUM, SEGMENTAL RESECTION:             -  PERFORATED DIVERTICULITIS WITH SEROSAL ADHESIONS AND   ADJACENT MURAL ABSCESS.        -  MARGINS APPEAR VIABLE.        -  BENIGN REACTIVE MESENTERIC LYMPH NODES. Gross Description   \"NOE LUIS ANTONIO, SIGMOID AND UPPER RECTUM\" 15.0 cm long x 4.0 cm in circumference segment of colon with a large amount of mesenteric fat. The serosa is pink-tan with adhesions and in an area of dense adhesions, there is a 0.2 cm transmural defect that is 4.0 cm from the closest margin.  The mucosa is pink-tan with diverticula up to 1.5 cm and in the center of the segment, away from the aforementioned defect, there is a region of cavitation within the wall and fat consistent with abscess. Rickford Math are no masses.  The wall ranges in thickness from 0.2 to 0.8 cm with no marked stricture.  Within the fat there are a few rubbery nodes up to 0.4 cm.  Cassette summary:  \"A\" resection margins shave, \"B-C\" transmural defect, \"D\" separate abscess and representative nodes. Radiology:    Xr Abdomen (kub) (single Ap View)  Result Date: 3/5/2021  Multiple dilated gas-filled small bowel loops with scattered areas of gas in the colon. There are more dilated loops appreciated compared to prior. Differential remains ileus versus obstruction although the progression is more worrisome for obstruction. Xr Chest Portable  Result Date: 3/4/2021  1. Enlarged cardiac silhouette with prominence of the pulmonary vasculature. 2. Minimal bibasilar opacification, which is unchanged. 3. No large pleural effusions. Assessment/Plan:     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 Seroma - no signs of wound infection     · Pain and nausea control PRN  · D/C NGT. Continue CLD  · Continue TPN  · IVF - total IV 125cc/h  · VTE ppx: Hep TID  · Appreciate IM assistance with medical management. · Wound care: continue daily packing changes. Ok to shower.     Princess Won MD  General Surgery PGY3  Available via [employer]  Attending: MD ZARI Gabriel Asa, Dr. saw and examined the patient. I have edited the above and agree with the above. Nehal Ricks  Colorectal Surgery

## 2021-03-08 NOTE — PLAN OF CARE
Problem: Pain:  Goal: Pain level will decrease  Description: Pain level will decrease  Outcome: Ongoing  Note: Patient states understanding of pain scale and interventions. Pain assessed with hourly rounding and PRN. Patient states pain level is 2/10. Patient happy with pain level at this time. Will continue to monitor. Problem: Falls - Risk of:  Goal: Will remain free from falls  Description: Will remain free from falls  Outcome: Ongoing  Note: Siderails up x 2  Hourly rounding. Call light in reach. Instructed to call for assist before attempting out of bed. Remains free from falls and accidental injury at this time. Floor free from obstacles, and bed is locked and in lowest position. Adequate lighting provided. Bed alarm on. Fall sticker on wristband. Fall Sign posted in doorway      Problem: Pain:  Goal: Pain level will decrease  Description: Pain level will decrease  Outcome: Ongoing  Note: Patient states understanding of pain scale and interventions. Pain assessed with hourly rounding and PRN. Patient states pain level is 2/10. Patient happy with pain level at this time. Will continue to monitor.

## 2021-03-08 NOTE — CARE COORDINATION
Discharge planning    Patient chart reviewed, discussed with RN and Dr Stefan Haile. Patient NG removed, TPN likely go thru tomorrow. Did discuss LTAC with Dr Stefan Haile and feels that patient will be appropriate for SNF. Did send referral to donna at [facility] to follow for now. CL diet currently    SS is working on placement with [facility]. Updated regarding the above and likely need for snf , not ltac.       SHAY in epic

## 2021-03-08 NOTE — PROGRESS NOTES
status, Routine general medical examination at a health care facility, Special screening for malignant neoplasms, colon, Special screening for malignant neoplasms, vagina, and Urinary incontinence. has a past surgical history that includes Appendectomy; Mitral valve repair; Hysterectomy; bronchoscopy; Cardiac catheterization (03/22/2017); Cardiac catheterization (11/18*/2016); Wound Exploration; other surgical history; Ovary removal; eye surgery (Bilateral); Colonoscopy; Endoscopy, colon, diagnostic; fracture surgery; and Small intestine surgery (N/A, 2/25/2021). Restrictions  Restrictions/Precautions  Restrictions/Precautions: General Precautions, Surgical Protocols, Fall Risk, Up as Tolerated  Required Braces or Orthoses?: No  Position Activity Restriction  Other position/activity restrictions: 2L O2, RUE IV, incision on stomach region with dressing, colostomy, garrido cath, ambulate pt, telemetry  Subjective   General  Chart Reviewed: Yes  Response To Previous Treatment: Patient with no complaints from previous session. Family / Caregiver Present: No  Subjective  Subjective: Pt alert and agreeable to PT treatment. General Comment  Comments: CATHY Russell reports patient medically stable for PT treatment  Pain Screening  Patient Currently in Pain: Yes  Vital Signs  Patient Currently in Pain: Yes       Orientation     Cognition      Objective   Bed mobility  Scooting: Contact guard assistance  Transfers  Sit to Stand: Contact guard assistance;Minimal Assistance  Stand to sit: Contact guard assistance;Minimal Assistance  Bed to Chair: Contact guard assistance;Minimal assistance  Stand Pivot Transfers: Contact guard assistance;Minimal Assistance  Squat Pivot Transfers: Contact guard assistance  Lateral Transfers: Contact guard assistance;Minimal Assistance  Comment: Patient requires encouragement and vc's for hand placement and sequencing with RW. Patient requires increased Assist for line management.   Patient with good safety awareness and improved static postural alignment  Ambulation  Ambulation?: Yes  Ambulation 1  Surface: level tile  Device: Rolling Walker  Other Apparatus: O2  Assistance: Minimal assistance  Quality of Gait: Patient with slow careful gait pattern. Step to sequencing with decreased toe clearance. Gait Deviations: Slow Teena; Increased DAYSI; Decreased step length;Decreased step height  Distance: 5 feet x 1  Comments: Patient amb from bed to chair, min A for cues and line management. No LOB noted, however patient SOB upon sitting. Balance  Posture: Fair  Sitting - Static: Good  Sitting - Dynamic: Fair;+  Standing - Static: Fair  Standing - Dynamic: Fair  Exercises  Quad Sets: 10  Heelslides: 10  Gluteal Sets: 10  Hip Abduction: 10  Knee Short Arc Quad: 10  Ankle Pumps: 10  Comments: Pt performed VIRGILIO LE exercises x 10 reps in bedside chair. Patient requires increased time to complete all exercises. Patient given vc's for positioning and sequencing. G-Code     OutComes Score       AM-PAC Score     AM-PAC Inpatient Mobility without Stair Climbing Raw Score : 15 (03/08/21 1313)  AM-PAC Inpatient without Stair Climbing T-Scale Score : 43.03 (03/08/21 1313)  Mobility Inpatient CMS 0-100% Score: 47.43 (03/08/21 1313)  Mobility Inpatient without Stair CMS G-Code Modifier : CK (03/08/21 1313)       Goals  Short term goals  Time Frame for Short term goals: 12 visits  Short term goal 1: Pt to be indep with all bed mobility, demo'ing log rolling  Short term goal 2: Pt to be indep with all functional transfers  Short term goal 3: Pt will ambulate 200ft with RW/LRAD and SBA  Short term goal 4: Pt will tolerate 25mins of PT including therex, theract, gait training and neuro re-ed to improve functional mobiilty and activity tolerance. Patient Goals   Patient goals :  To feel better    Plan    Plan  Times per week: 1-2x day / 5-6 days per week  Current Treatment Recommendations: Strengthening, Transfer Training, Endurance Training, Neuromuscular Re-education, Patient/Caregiver Education & Training, Balance Training, Gait Training, Home Exercise Program, Functional Mobility Training, Safety Education & Training, Positioning  Safety Devices  Type of devices:  All fall risk precautions in place, Call light within reach, Left in chair, Gait belt  Restraints  Initially in place: No     Therapy Time   Individual Concurrent Group Co-treatment   Time In  [address] [place] [address]         Time Out  1210         Minutes  [address], Student Physical Therapist Assistant

## 2021-03-08 NOTE — PROGRESS NOTES
[facility] [facility] Assessment complete. S/P colectomy [Z90.49] . Vitals:    03/08/21 0804   BP: (!) 146/60   Pulse: 75   Resp: 16   Temp: 98.2 °F (36.8 °C)   SpO2: 97%   . Patients home meds are   Prior to Admission medications    Medication Sig Start Date End Date Taking?  Authorizing Provider   Calcium Carbonate (CALCIUM 600 PO) Take 600 mg by mouth daily   Yes Historical Provider, MD   dextromethorphan-guaiFENesin (MUCINEX DM)  MG per extended release tablet Take 1 tablet by mouth 2 times daily   Yes Historical Provider, MD   famotidine (PEPCID) 20 MG tablet Take 20 mg by mouth daily as needed (heartburn)   Yes Historical Provider, MD   miconazole (MICOTIN) 2 % cream Apply topically 2 times daily as needed   Yes Historical Provider, MD   amiodarone (CORDARONE) 200 MG tablet Take 100 mg by mouth three times a week Takes 1/2 tab (=100mg) Monday, Wednesday, friday   Yes Historical Provider, MD   ascorbic acid (VITAMIN C) 1000 MG tablet Take 1,000 mg by mouth every evening    Yes Historical Provider, MD   torsemide (DEMADEX) 20 MG tablet Take 60 mg by mouth daily Takes 3 tabs (=60mg) daily   Yes Historical Provider, MD   acetaminophen (TYLENOL) 650 MG extended release tablet Take 650 mg by mouth every 8 hours as needed    Yes Historical Provider, MD   albuterol sulfate  (90 Base) MCG/ACT inhaler Inhale 2 puffs into the lungs every 4 hours as needed    Yes Historical Provider, MD   aspirin 325 MG tablet Take 325 mg by mouth every evening    Yes Historical Provider, MD   atorvastatin (LIPITOR) 20 MG tablet Take 10 mg by mouth every evening Takes 1/2 tab (=10mg) nightly 5/30/18  Yes Historical Provider, MD   Azelastine-Fluticasone 137-50 MCG/ACT SUSP 1 spray by Nasal route 2 times daily as needed (allergies)  4/16/18  Yes Historical Provider, MD   buPROPion (WELLBUTRIN SR) 150 MG extended release tablet Take 150 mg by mouth 2 times daily  7/30/18  Yes Historical Provider, MD   calcium carbonate-vitamin D (CALTRATE) 600-400 MG-UNIT TABS per tab Take 1 tablet by mouth daily    Yes Historical Provider, MD   cetirizine (ZYRTEC) 10 MG tablet Take 10 mg by mouth every evening  11/17/17  Yes Historical Provider, MD   vitamin D3 (CHOLECALCIFEROL) 125 MCG (5000 UT) TABS tablet Take 5,000 Units by mouth every evening    Yes Historical Provider, MD   citalopram (CELEXA) 20 MG tablet Take 10 mg by mouth daily Takes 1/2 tab (=10mg) daily 7/16/18  Yes Historical Provider, MD   fluticasone (FLONASE) 50 MCG/ACT nasal spray 1 spray by Nasal route 2 times daily as needed (if not using azelastine nasal spray)  7/12/17  Yes Historical Provider, MD   fluticasone-salmeterol (ADVAIR) 500-50 MCG/DOSE diskus inhaler Inhale 1 puff into the lungs 2 times daily    Yes Historical Provider, MD   ipratropium-albuterol (DUONEB) 0.5-2.5 (3) MG/3ML SOLN nebulizer solution Inhale 3 mLs into the lungs every 6 hours as needed    Yes Historical Provider, MD   LUTEIN PO Take 1 capsule by mouth daily    Yes Historical Provider, MD   Melatonin 10 MG TABS Take 10 mg by mouth nightly as needed (sleep)    Yes Historical Provider, MD   metoprolol tartrate (LOPRESSOR) 25 MG tablet Take 25 mg by mouth 2 times daily    Yes Historical Provider, MD   Multiple Vitamins-Minerals (MULTIVITAMIN ADULT PO) Take 1 tablet by mouth every evening    Yes Historical Provider, MD   nystatin ([address]) 589864 UNIT/GM powder Apply topically 3 times daily as needed (under breasts)  5/21/18  Yes Historical Provider, MD   Omega-3 Fatty Acids (FISH OIL PO) Take 1 g by mouth every evening    Yes Historical Provider, MD   pantoprazole (PROTONIX) 40 MG tablet Take 40 mg by mouth daily  10/5/18  Yes Historical Provider, MD   senna-docusate (Preston Dueñas) 8.6-50 MG per tablet Take 1 tablet by mouth every evening    Yes Historical Provider, MD   spironolactone (ALDACTONE) 25 MG tablet Take 25 mg by mouth every morning  7/3/18  Yes Historical Provider, MD   solifenacin (VESICARE) 5 MG tablet Take 5 mg by mouth daily  8/24/18  Yes Historical Provider, MD   .  Recent Surgical History: Surgery of Extremities = 1     Assessment     Peak Flow (asthma only)    Predicted:   Personal Best:   PEF   % Predicted   Peak Flow :     FEV1/FVC    FEV1 Predicted       FEV1     FEV1 % Predicted   FVC   IS volume   IBW   FIO2% 4lpm NC  SPO2 97 %  RR 20  Breath Sounds: Diminished      · Bronchodilator assessment at level  3  · Hyperinflation assessment at level 3  · Secretion Management assessment at level 2   ·   · []    Bronchodilator Assessment  BRONCHODILATOR ASSESSMENT SCORE  Score 0 1 2 3 4 5   Breath Sounds   []  Patient Baseline []  No Wheeze good aeration []  Faint, scattered wheezing, good aeration [x]  Expiratory Wheezing and or moderately diminished []  Insp/Exp wheeze and/or very diminished []  Insp/Exp and/ or marked distress   Respiratory Rate   []  Patient Baseline []  Less than 20 []  Less than 20 [x]  20-25 []  Greater than 25 []  Greater than 25   Peak flow % of Pred or PB [x]  NA   []  Greater than 90%  []  81-90% []  71-80% []  Less than or equal to 70%  or unable to perform []  Unable due to Respiratory Distress   Dyspnea re []  Patient Baseline []  No SOB []  No SOB [x]  SOB on exertion []  SOB min activity []  At rest/acute   e FEV% Predicted       []  NA []  Above 69%  []  Unable []  Above 60-69%  []  Unable []  Above 50-59%  [x]  Unable []  Above 35-49%  []  Unable []  Less than 35%  []  Unable                 []  Hyperinflation Assessment  Score 1 2 3   CXR and Breath Sounds   []  Clear []  No atelectasis  Basilar aeration [x]  Atelectasis or absent basilar breath sounds   Incentive Spirometry Volume  (Per IBW)   []  Greater than or equal to 15ml/Kg [x]  less than 15ml/Kg []  less than 15ml/Kg   Surgery within last 2 weeks []  None or general   [x]  Abdominal or thoracic surgery  []  Abdominal or thoracic   Chronic Pulmonary Historyre []  No [x]  Yes [x]  Yes     []

## 2021-03-08 NOTE — PLAN OF CARE
Problem: Pain:  Goal: Pain level will decrease  Description: Pain level will decrease  Outcome: Ongoing  Goal: Control of acute pain  Description: Control of acute pain  Outcome: Ongoing  Goal: Control of chronic pain  Description: Control of chronic pain  Outcome: Ongoing     Problem: Falls - Risk of:  Goal: Will remain free from falls  Description: Will remain free from falls  Outcome: Ongoing  Goal: Absence of physical injury  Description: Absence of physical injury  Outcome: Ongoing     Problem: Skin Integrity:  Goal: Will show no infection signs and symptoms  Description: Will show no infection signs and symptoms  Outcome: Ongoing  Goal: Absence of new skin breakdown  Description: Absence of new skin breakdown  Outcome: Ongoing     Problem: Discharge Planning:  Goal: Discharged to appropriate level of care  Description: Discharged to appropriate level of care  Outcome: Ongoing     Problem:  Bowel Function - Altered:  Goal: Bowel elimination is within specified parameters  Description: Bowel elimination is within specified parameters  Outcome: Ongoing     Problem: Infection - Surgical Site:  Goal: Will show no infection signs and symptoms  Description: Will show no infection signs and symptoms  Outcome: Ongoing  Goal: Demonstration of wound healing without infection will improve  Description: Demonstration of wound healing without infection will improve  Outcome: Ongoing     Problem: Pain:  Goal: Pain level will decrease  Description: Pain level will decrease  Outcome: Ongoing

## 2021-03-08 NOTE — PLAN OF CARE
Nutrition Problem #1: Mild malnutrition, In context of acute illness or injury  Intervention: Food and/or Nutrient Delivery: Continue Current Diet, Modify Parenteral Nutrition  Nutritional Goals: PN will meet greater than 75% of estimated nutrition needs none

## 2021-03-09 ENCOUNTER — APPOINTMENT (OUTPATIENT)
Dept: CT IMAGING | Age: 76
DRG: 981 | End: 2021-03-09
Attending: COLON & RECTAL SURGERY
Payer: MEDICARE

## 2021-03-09 ENCOUNTER — APPOINTMENT (OUTPATIENT)
Dept: GENERAL RADIOLOGY | Age: 76
DRG: 981 | End: 2021-03-09
Attending: COLON & RECTAL SURGERY
Payer: MEDICARE

## 2021-03-09 PROBLEM — S21.119A LACERATION OF CHEST WALL: Status: RESOLVED | Noted: 2017-08-22 | Resolved: 2021-03-09

## 2021-03-09 PROBLEM — D64.9 ANEMIA: Status: ACTIVE | Noted: 2021-03-09

## 2021-03-09 PROBLEM — I34.0 NON-RHEUMATIC MITRAL REGURGITATION: Status: RESOLVED | Noted: 2017-03-07 | Resolved: 2021-03-09

## 2021-03-09 PROBLEM — I34.0 MITRAL INCOMPETENCE: Status: RESOLVED | Noted: 2017-04-12 | Resolved: 2021-03-09

## 2021-03-09 PROBLEM — N39.0 RECURRENT UTI: Status: ACTIVE | Noted: 2020-12-07

## 2021-03-09 PROBLEM — N32.1 COLOVESICAL FISTULA: Status: ACTIVE | Noted: 2021-03-09

## 2021-03-09 PROBLEM — I48.91 ATRIAL FIBRILLATION (HCC): Status: ACTIVE | Noted: 2021-03-09

## 2021-03-09 PROBLEM — F33.42 RECURRENT MAJOR DEPRESSIVE DISORDER, IN FULL REMISSION (HCC): Status: RESOLVED | Noted: 2021-02-01 | Resolved: 2021-03-09

## 2021-03-09 PROBLEM — N39.0 RECURRENT UTI: Chronic | Status: ACTIVE | Noted: 2020-12-07

## 2021-03-09 PROBLEM — F33.42 RECURRENT MAJOR DEPRESSIVE DISORDER, IN FULL REMISSION (HCC): Status: ACTIVE | Noted: 2021-02-01

## 2021-03-09 PROBLEM — K57.92 DIVERTICULITIS: Status: RESOLVED | Noted: 2018-10-01 | Resolved: 2021-03-09

## 2021-03-09 PROBLEM — I50.9 ACUTE ON CHRONIC CONGESTIVE HEART FAILURE (HCC): Status: ACTIVE | Noted: 2019-08-13

## 2021-03-09 PROBLEM — J95.821 ACUTE RESPIRATORY FAILURE FOLLOWING TRAUMA AND SURGERY (HCC): Status: RESOLVED | Noted: 2017-04-12 | Resolved: 2021-03-09

## 2021-03-09 PROBLEM — I50.9 CHF (CONGESTIVE HEART FAILURE) (HCC): Chronic | Status: ACTIVE | Noted: 2018-04-27

## 2021-03-09 PROBLEM — T88.51XA: Status: RESOLVED | Noted: 2017-04-12 | Resolved: 2021-03-09

## 2021-03-09 PROBLEM — S42.209A CLOSED FRACTURE OF PROXIMAL END OF HUMERUS: Status: RESOLVED | Noted: 2018-10-16 | Resolved: 2021-03-09

## 2021-03-09 PROBLEM — Z98.890 HISTORY OF MITRAL VALVE REPAIR: Status: RESOLVED | Noted: 2019-08-23 | Resolved: 2021-03-09

## 2021-03-09 PROBLEM — J96.01 ACUTE RESPIRATORY FAILURE WITH HYPOXIA (HCC): Status: ACTIVE | Noted: 2019-08-14

## 2021-03-09 PROBLEM — Z98.890 HISTORY OF MITRAL VALVE REPAIR: Status: ACTIVE | Noted: 2019-08-23

## 2021-03-09 PROBLEM — N39.0 URINARY TRACT INFECTION: Status: ACTIVE | Noted: 2021-03-09

## 2021-03-09 PROBLEM — N17.9 ACUTE RENAL FAILURE (ARF) (HCC): Status: ACTIVE | Noted: 2021-03-09

## 2021-03-09 LAB
ABSOLUTE EOS #: 0.33 K/UL (ref 0–0.4)
ABSOLUTE IMMATURE GRANULOCYTE: 0.83 K/UL (ref 0–0.3)
ABSOLUTE LYMPH #: 1.16 K/UL (ref 1–4.8)
ABSOLUTE MONO #: 1.33 K/UL (ref 0.2–0.8)
ANION GAP SERPL CALCULATED.3IONS-SCNC: 13 MMOL/L (ref 9–17)
BASOPHILS # BLD: 0 %
BASOPHILS ABSOLUTE: 0 K/UL (ref 0–0.2)
BNP INTERPRETATION: ABNORMAL
BUN BLDV-MCNC: 22 MG/DL (ref 8–23)
BUN/CREAT BLD: 21 (ref 9–20)
C-REACTIVE PROTEIN: 183.2 MG/L (ref 0–5)
CALCIUM SERPL-MCNC: 8.6 MG/DL (ref 8.6–10.4)
CHLORIDE BLD-SCNC: 98 MMOL/L (ref 98–107)
CO2: 24 MMOL/L (ref 20–31)
CREAT SERPL-MCNC: 1.04 MG/DL (ref 0.5–0.9)
DIFFERENTIAL TYPE: ABNORMAL
EKG ATRIAL RATE: 192 BPM
EKG Q-T INTERVAL: 350 MS
EKG QRS DURATION: 96 MS
EKG QTC CALCULATION (BAZETT): 444 MS
EKG R AXIS: -4 DEGREES
EKG T AXIS: 64 DEGREES
EKG VENTRICULAR RATE: 97 BPM
EOSINOPHILS RELATIVE PERCENT: 2 % (ref 1–4)
GFR AFRICAN AMERICAN: >60 ML/MIN
GFR NON-AFRICAN AMERICAN: 52 ML/MIN
GFR SERPL CREATININE-BSD FRML MDRD: ABNORMAL ML/MIN/{1.73_M2}
GFR SERPL CREATININE-BSD FRML MDRD: ABNORMAL ML/MIN/{1.73_M2}
GLUCOSE BLD-MCNC: 169 MG/DL (ref 65–105)
GLUCOSE BLD-MCNC: 171 MG/DL (ref 65–105)
GLUCOSE BLD-MCNC: 181 MG/DL (ref 70–99)
GLUCOSE BLD-MCNC: 187 MG/DL (ref 65–105)
GLUCOSE BLD-MCNC: 188 MG/DL (ref 65–105)
HCT VFR BLD CALC: 35.6 % (ref 36.3–47.1)
HEMOGLOBIN: 10.6 G/DL (ref 11.9–15.1)
IMMATURE GRANULOCYTES: 5 %
LV EF: 60 %
LVEF MODALITY: NORMAL
LYMPHOCYTES # BLD: 7 % (ref 24–44)
MAGNESIUM: 2 MG/DL (ref 1.6–2.6)
MCH RBC QN AUTO: 29 PG (ref 25.2–33.5)
MCHC RBC AUTO-ENTMCNC: 29.8 G/DL (ref 28.4–34.8)
MCV RBC AUTO: 97.5 FL (ref 82.6–102.9)
MONOCYTES # BLD: 8 % (ref 1–7)
MORPHOLOGY: ABNORMAL
NRBC AUTOMATED: ABNORMAL PER 100 WBC
PDW BLD-RTO: 15 % (ref 11.8–14.4)
PHOSPHORUS: 3.6 MG/DL (ref 2.6–4.5)
PLATELET # BLD: 283 K/UL (ref 138–453)
PLATELET ESTIMATE: ABNORMAL
PMV BLD AUTO: 9.7 FL (ref 8.1–13.5)
POTASSIUM SERPL-SCNC: 3.9 MMOL/L (ref 3.7–5.3)
PRO-BNP: 4525 PG/ML
RBC # BLD: 3.65 M/UL (ref 3.95–5.11)
RBC # BLD: ABNORMAL 10*6/UL
SEG NEUTROPHILS: 78 % (ref 36–66)
SEGMENTED NEUTROPHILS ABSOLUTE COUNT: 12.95 K/UL (ref 1.8–7.7)
SODIUM BLD-SCNC: 135 MMOL/L (ref 135–144)
WBC # BLD: 16.6 K/UL (ref 3.5–11.3)
WBC # BLD: ABNORMAL 10*3/UL

## 2021-03-09 PROCEDURE — 6370000000 HC RX 637 (ALT 250 FOR IP): Performed by: INTERNAL MEDICINE

## 2021-03-09 PROCEDURE — 83735 ASSAY OF MAGNESIUM: CPT

## 2021-03-09 PROCEDURE — 99222 1ST HOSP IP/OBS MODERATE 55: CPT | Performed by: INTERNAL MEDICINE

## 2021-03-09 PROCEDURE — 71045 X-RAY EXAM CHEST 1 VIEW: CPT

## 2021-03-09 PROCEDURE — 2500000003 HC RX 250 WO HCPCS: Performed by: COLON & RECTAL SURGERY

## 2021-03-09 PROCEDURE — 94640 AIRWAY INHALATION TREATMENT: CPT

## 2021-03-09 PROCEDURE — 2060000000 HC ICU INTERMEDIATE R&B

## 2021-03-09 PROCEDURE — C9113 INJ PANTOPRAZOLE SODIUM, VIA: HCPCS | Performed by: INTERNAL MEDICINE

## 2021-03-09 PROCEDURE — 6360000002 HC RX W HCPCS: Performed by: STUDENT IN AN ORGANIZED HEALTH CARE EDUCATION/TRAINING PROGRAM

## 2021-03-09 PROCEDURE — 84100 ASSAY OF PHOSPHORUS: CPT

## 2021-03-09 PROCEDURE — 2700000000 HC OXYGEN THERAPY PER DAY

## 2021-03-09 PROCEDURE — 93306 TTE W/DOPPLER COMPLETE: CPT

## 2021-03-09 PROCEDURE — 85025 COMPLETE CBC W/AUTO DIFF WBC: CPT

## 2021-03-09 PROCEDURE — 6360000002 HC RX W HCPCS: Performed by: INTERNAL MEDICINE

## 2021-03-09 PROCEDURE — 97116 GAIT TRAINING THERAPY: CPT

## 2021-03-09 PROCEDURE — 6370000000 HC RX 637 (ALT 250 FOR IP): Performed by: STUDENT IN AN ORGANIZED HEALTH CARE EDUCATION/TRAINING PROGRAM

## 2021-03-09 PROCEDURE — 2580000003 HC RX 258: Performed by: STUDENT IN AN ORGANIZED HEALTH CARE EDUCATION/TRAINING PROGRAM

## 2021-03-09 PROCEDURE — 6370000000 HC RX 637 (ALT 250 FOR IP): Performed by: COLON & RECTAL SURGERY

## 2021-03-09 PROCEDURE — 80048 BASIC METABOLIC PNL TOTAL CA: CPT

## 2021-03-09 PROCEDURE — 74177 CT ABD & PELVIS W/CONTRAST: CPT

## 2021-03-09 PROCEDURE — 2500000003 HC RX 250 WO HCPCS: Performed by: STUDENT IN AN ORGANIZED HEALTH CARE EDUCATION/TRAINING PROGRAM

## 2021-03-09 PROCEDURE — 94761 N-INVAS EAR/PLS OXIMETRY MLT: CPT

## 2021-03-09 PROCEDURE — 82947 ASSAY GLUCOSE BLOOD QUANT: CPT

## 2021-03-09 PROCEDURE — 83880 ASSAY OF NATRIURETIC PEPTIDE: CPT

## 2021-03-09 PROCEDURE — 36415 COLL VENOUS BLD VENIPUNCTURE: CPT

## 2021-03-09 PROCEDURE — 94669 MECHANICAL CHEST WALL OSCILL: CPT

## 2021-03-09 PROCEDURE — 93010 ELECTROCARDIOGRAM REPORT: CPT | Performed by: INTERNAL MEDICINE

## 2021-03-09 PROCEDURE — 71260 CT THORAX DX C+: CPT

## 2021-03-09 PROCEDURE — 6360000004 HC RX CONTRAST MEDICATION: Performed by: STUDENT IN AN ORGANIZED HEALTH CARE EDUCATION/TRAINING PROGRAM

## 2021-03-09 PROCEDURE — 6360000002 HC RX W HCPCS: Performed by: NURSE PRACTITIONER

## 2021-03-09 PROCEDURE — 97530 THERAPEUTIC ACTIVITIES: CPT | Performed by: NURSE PRACTITIONER

## 2021-03-09 PROCEDURE — 73630 X-RAY EXAM OF FOOT: CPT

## 2021-03-09 PROCEDURE — 97530 THERAPEUTIC ACTIVITIES: CPT

## 2021-03-09 RX ORDER — OXYCODONE HYDROCHLORIDE 5 MG/1
2.5 TABLET ORAL EVERY 6 HOURS PRN
Status: DISCONTINUED | OUTPATIENT
Start: 2021-03-09 | End: 2021-03-12 | Stop reason: HOSPADM

## 2021-03-09 RX ORDER — ACETAMINOPHEN 500 MG
1000 TABLET ORAL EVERY 8 HOURS SCHEDULED
Status: DISCONTINUED | OUTPATIENT
Start: 2021-03-09 | End: 2021-03-12 | Stop reason: HOSPADM

## 2021-03-09 RX ORDER — 0.9 % SODIUM CHLORIDE 0.9 %
80 INTRAVENOUS SOLUTION INTRAVENOUS ONCE
Status: COMPLETED | OUTPATIENT
Start: 2021-03-09 | End: 2021-03-09

## 2021-03-09 RX ORDER — WARFARIN SODIUM 5 MG/1
5 TABLET ORAL DAILY
Status: DISCONTINUED | OUTPATIENT
Start: 2021-03-09 | End: 2021-03-12

## 2021-03-09 RX ORDER — IPRATROPIUM BROMIDE AND ALBUTEROL SULFATE 2.5; .5 MG/3ML; MG/3ML
1 SOLUTION RESPIRATORY (INHALATION)
Status: DISCONTINUED | OUTPATIENT
Start: 2021-03-09 | End: 2021-03-09

## 2021-03-09 RX ORDER — SODIUM CHLORIDE 0.9 % (FLUSH) 0.9 %
10 SYRINGE (ML) INJECTION PRN
Status: DISCONTINUED | OUTPATIENT
Start: 2021-03-09 | End: 2021-03-12 | Stop reason: HOSPADM

## 2021-03-09 RX ORDER — AMIODARONE HYDROCHLORIDE 200 MG/1
200 TABLET ORAL DAILY
Status: DISCONTINUED | OUTPATIENT
Start: 2021-03-10 | End: 2021-03-12

## 2021-03-09 RX ADMIN — SUCRALFATE 1 G: 1 TABLET ORAL at 12:27

## 2021-03-09 RX ADMIN — INSULIN LISPRO 1 UNITS: 100 INJECTION, SOLUTION INTRAVENOUS; SUBCUTANEOUS at 06:32

## 2021-03-09 RX ADMIN — FLUTICASONE PROPIONATE 1 SPRAY: 50 SPRAY, METERED NASAL at 09:12

## 2021-03-09 RX ADMIN — PANTOPRAZOLE SODIUM 40 MG: 40 INJECTION, POWDER, FOR SOLUTION INTRAVENOUS at 09:12

## 2021-03-09 RX ADMIN — TORSEMIDE 20 MG: 20 TABLET ORAL at 09:12

## 2021-03-09 RX ADMIN — ACETYLCYSTEINE 400 MG: 200 SOLUTION ORAL; RESPIRATORY (INHALATION) at 07:28

## 2021-03-09 RX ADMIN — ALBUTEROL SULFATE 2.5 MG: 2.5 SOLUTION RESPIRATORY (INHALATION) at 15:28

## 2021-03-09 RX ADMIN — IOHEXOL 30 ML: 300 INJECTION, SOLUTION INTRAVENOUS at 08:51

## 2021-03-09 RX ADMIN — GUAIFENESIN 600 MG: 600 TABLET, EXTENDED RELEASE ORAL at 21:37

## 2021-03-09 RX ADMIN — HEPARIN SODIUM 5000 UNITS: 5000 INJECTION INTRAVENOUS; SUBCUTANEOUS at 14:46

## 2021-03-09 RX ADMIN — CALCIUM GLUCONATE: 94 INJECTION, SOLUTION INTRAVENOUS at 18:14

## 2021-03-09 RX ADMIN — ACETYLCYSTEINE 400 MG: 200 SOLUTION ORAL; RESPIRATORY (INHALATION) at 19:16

## 2021-03-09 RX ADMIN — BUPROPION HYDROCHLORIDE 150 MG: 150 TABLET, EXTENDED RELEASE ORAL at 09:12

## 2021-03-09 RX ADMIN — METOPROLOL TARTRATE 12.5 MG: 25 TABLET, FILM COATED ORAL at 21:37

## 2021-03-09 RX ADMIN — SUCRALFATE 1 G: 1 TABLET ORAL at 06:30

## 2021-03-09 RX ADMIN — ALBUTEROL SULFATE 2.5 MG: 2.5 SOLUTION RESPIRATORY (INHALATION) at 07:28

## 2021-03-09 RX ADMIN — PIPERACILLIN AND TAZOBACTAM 3375 MG: 3; .375 INJECTION, POWDER, LYOPHILIZED, FOR SOLUTION INTRAVENOUS at 21:36

## 2021-03-09 RX ADMIN — PIPERACILLIN SODIUM AND TAZOBACTAM SODIUM 4500 MG: 4; .5 INJECTION, POWDER, LYOPHILIZED, FOR SOLUTION INTRAVENOUS at 09:13

## 2021-03-09 RX ADMIN — ACETAMINOPHEN 1000 MG: 500 TABLET ORAL at 14:45

## 2021-03-09 RX ADMIN — SUCRALFATE 1 G: 1 TABLET ORAL at 14:46

## 2021-03-09 RX ADMIN — ACETAMINOPHEN 1000 MG: 500 TABLET ORAL at 09:11

## 2021-03-09 RX ADMIN — ACETAMINOPHEN 1000 MG: 500 TABLET ORAL at 21:37

## 2021-03-09 RX ADMIN — WARFARIN SODIUM 5 MG: 5 TABLET ORAL at 18:13

## 2021-03-09 RX ADMIN — SPIRONOLACTONE 25 MG: 25 TABLET ORAL at 09:12

## 2021-03-09 RX ADMIN — ALBUTEROL SULFATE 2.5 MG: 2.5 SOLUTION RESPIRATORY (INHALATION) at 19:23

## 2021-03-09 RX ADMIN — INSULIN LISPRO 1 UNITS: 100 INJECTION, SOLUTION INTRAVENOUS; SUBCUTANEOUS at 12:27

## 2021-03-09 RX ADMIN — CITALOPRAM HYDROBROMIDE 10 MG: 10 TABLET ORAL at 09:11

## 2021-03-09 RX ADMIN — GUAIFENESIN 600 MG: 600 TABLET, EXTENDED RELEASE ORAL at 09:12

## 2021-03-09 RX ADMIN — IOPAMIDOL 100 ML: 755 INJECTION, SOLUTION INTRAVENOUS at 08:51

## 2021-03-09 RX ADMIN — I.V. FAT EMULSION 100 ML: 20 EMULSION INTRAVENOUS at 18:13

## 2021-03-09 RX ADMIN — AMIODARONE HYDROCHLORIDE 100 MG: 100 TABLET ORAL at 09:12

## 2021-03-09 RX ADMIN — SODIUM CHLORIDE 80 ML: 9 INJECTION, SOLUTION INTRAVENOUS at 08:51

## 2021-03-09 RX ADMIN — ATORVASTATIN CALCIUM 20 MG: 20 TABLET, FILM COATED ORAL at 09:12

## 2021-03-09 RX ADMIN — ALBUTEROL SULFATE 2.5 MG: 2.5 SOLUTION RESPIRATORY (INHALATION) at 11:09

## 2021-03-09 RX ADMIN — SODIUM CHLORIDE, PRESERVATIVE FREE 10 ML: 5 INJECTION INTRAVENOUS at 09:14

## 2021-03-09 RX ADMIN — INSULIN LISPRO 1 UNITS: 100 INJECTION, SOLUTION INTRAVENOUS; SUBCUTANEOUS at 00:52

## 2021-03-09 RX ADMIN — HEPARIN SODIUM 5000 UNITS: 5000 INJECTION INTRAVENOUS; SUBCUTANEOUS at 21:37

## 2021-03-09 RX ADMIN — Medication 10 ML: at 08:51

## 2021-03-09 RX ADMIN — METOPROLOL TARTRATE 12.5 MG: 25 TABLET, FILM COATED ORAL at 09:11

## 2021-03-09 RX ADMIN — INSULIN LISPRO 1 UNITS: 100 INJECTION, SOLUTION INTRAVENOUS; SUBCUTANEOUS at 19:58

## 2021-03-09 RX ADMIN — HEPARIN SODIUM 5000 UNITS: 5000 INJECTION INTRAVENOUS; SUBCUTANEOUS at 06:30

## 2021-03-09 RX ADMIN — PIPERACILLIN AND TAZOBACTAM 3375 MG: 3; .375 INJECTION, POWDER, LYOPHILIZED, FOR SOLUTION INTRAVENOUS at 14:47

## 2021-03-09 RX ADMIN — ACETYLCYSTEINE 400 MG: 200 SOLUTION ORAL; RESPIRATORY (INHALATION) at 15:28

## 2021-03-09 ASSESSMENT — PAIN DESCRIPTION - PROGRESSION

## 2021-03-09 ASSESSMENT — PAIN SCALES - GENERAL
PAINLEVEL_OUTOF10: 2
PAINLEVEL_OUTOF10: 3
PAINLEVEL_OUTOF10: 4
PAINLEVEL_OUTOF10: 2
PAINLEVEL_OUTOF10: 3

## 2021-03-09 NOTE — CONSULTS
[facility]  Initial Consult Note  Date: 3/9/2021    Hospital day :12     Impression:   1. 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 Acute on chronic respiratory insufficiency  3. Acute on chronic diastolic heart failure with mild pulmonary edema  4. Morbid obesity  5. Acute kidney injury on chronic kidney disease  6. E. coli UTI status post antimicrobial therapy    Recommendations   · The patient did have a systemic inflammatory response syndrome earlier today concern for sepsis and CT imaging did not show any chest or intra-abdominal findings to suggest an infection  · The patient does have intermittent openings along the midline incision with some drainage but no overt signs of infection  · At this point in time I will hold off antimicrobial therapy and monitor the clinical progress of antibiotics. Chief complaint/reason for consultation:   Sepsis    History of Present Illness: Nikunj Ghosh is a 67y.o.-year-old female who was initially admitted on 2/25/2021. Irene Dai has a history of asthma/COPD obstructive sleep apnea on BiPAP, coronary artery disease, congestive heart failure, morbid obesity hypertension, mitral valve repair 3 years ago, chronic kidney disease stage IIIa, frequent recurrent UTIs for more than 6 months and underwent a cystoscopy was discovered to have a colovesicular fistula related to diverticulitis for which the patient was referred to Dr. Jessica Lowe who recommended surgical intervention. The patient was admitted and underwent flexible sigmoidoscopy, exploratory laparotomy, sigmoid/superior rectum resection with creation of end ileostomy and mobilization of the splenic flexure and lysis of adhesions on 2/25/2021. Postoperatively the patient did develop acute on chronic hypoxic respiratory insufficiency requiring nocturnal oxygen.   The patient did have mild paralytic ileus postoperatively. The patient was subsequently started on ciprofloxacin for a urinary tract infection and this morning the white blood cell count was up to 16.6. The patient was found to have a new cough and she has been on 4 L of oxygen by nasal cannula. There was concern that the patient was becoming septic and antibiotic coverage was broadened this morning to Zosyn and vancomycin. A CAT scan of the chest, abdomen, pelvis was ordered due to concern for intra-abdominal abscess and there was no evidence of acute pulmonary embolism but was noted to have a postop findings with a small amount of dependent fluid in the pelvis and with the rim enhancement. No loculated fluid collection. The patient reports feeling some shortness of breath earlier today but now is feeling better. No fevers, chills, sweats, abdominal pain, nausea, vomiting or diarrhea. No dysuria or frequency. I was asked to evaluate and help with antibiotic choice. I have personally reviewed the past medical history, past surgical history, medications, social history, and family history, and I have updated the database accordingly.   Past Medical History:     Past Medical History:   Diagnosis Date    [facility] respiratory [facility] 4/12/2017    Adiposity 7/21/2016    Arthralgia of multiple joints 7/21/2016    Arthritis     Asthma 6/15/2016    Asthma with acute exacerbation 7/21/2016    Atopic rhinitis 6/15/2016    CAD (coronary artery disease)     [facility]congest[facility] failure[facility] 4/27/2018    Chronic bilateral low back pain without sciatica 6/15/2016    Closed fracture of proximal end of humerus 10/16/2018    COPD (chronic obstructive pulmonary disease) ([facility]     Depression 6/15/2016    Diverticulitis 10/1/2018    Diverticulosis of large intestine 6/15/2016    Diverticulosis of large intestine without hemorrhage 1/16/2017    Encounter for mammogram to establish baseline mammogram 7/21/2016    Essential hypertension 6/15/2016    H/O hysterectomy for benign disease 3/3/2013    Heart murmur 7/21/2016    History of blood transfusion     Hyperlipidemia 6/15/2016    Hypothermia due to anesthesia 4/12/2017    Laceration of chest wall 8/22/2017    Mitral incompetence 4/12/2017    Morbid obesity ([address] 6/15/2016    Non-rheumatic mitral regurgitation 3/7/2017    Polyp of sigmoid colon 6/15/2016    Postmenopausal status 6/15/2016    Routine general medical examination at a health care facility 7/21/2016    Special screening for malignant neoplasms, colon 7/21/2016    Special screening for malignant neoplasms, vagina 7/21/2016    Urinary incontinence 6/15/2016     Past Surgical  History:     Past Surgical History:   Procedure Laterality Date    APPENDECTOMY      BRONCHOSCOPY      CARDIAC CATHETERIZATION  03/22/2017    CARDIAC CATHETERIZATION  11/18*/2016    COLONOSCOPY      ENDOSCOPY, COLON, DIAGNOSTIC      EYE SURGERY Bilateral     cataract    FRACTURE SURGERY      clavicle/shoulder    HYSTERECTOMY      MITRAL VALVE REPAIR      OTHER SURGICAL HISTORY      Minimally invasive MVR 32MMCG future ring/kyle/femoral cannulation    OVARY REMOVAL      SMALL INTESTINE SURGERY N/A 2/25/2021    COLONOSCOPY WITH ABDOMINAL EXPLORATION   WITH BOWEL RESECTION LOW ANTERIOR   REPAIR  COLO VESSICLE FISTULA,   STOMA,  LYSIS OF ADHESIONS performed by Ricardo Gamble MD at [facility]      arterial bleed     Medications:      acetaminophen  1,000 mg Oral 3 times per day    piperacillin-tazobactam  3,375 mg Intravenous Q8H    [START ON 3/10/2021] amiodarone  200 mg Oral Daily    warfarin  5 mg Oral Daily    albuterol  2.5 mg Nebulization 4x daily    acetylcysteine  400 mg Inhalation TID    metoprolol tartrate  12.5 mg Oral BID    insulin lispro  0-6 Units Subcutaneous 4 times per day    fat emulsion  100 mL Intravenous Daily    heparin (porcine)  5,000 Units Subcutaneous 3 times per day    guaiFENesin  600 mg Oral BID    sucralfate  1 g Oral TID AC    pantoprazole  40 mg Intravenous Daily    fluticasone  1 spray Nasal Daily    atorvastatin  20 mg Oral Daily    buPROPion  150 mg Oral Daily    citalopram  10 mg Oral Daily    spironolactone  25 mg Oral Daily    torsemide  20 mg Oral Daily    sodium chloride flush  10 mL Intravenous 2 times per day     Social History:     Social History     Socioeconomic History    Marital status:      Spouse name: Not on file    Number of children: Not on file    Years of education: Not on file    Highest education level: Not on file   Occupational History    Not on file   Social Needs    Financial resource strain: Not on file    Food insecurity     Worry: Not on file     Inability: Not on file    Transportation needs     Medical: Not on file     Non-medical: Not on file   Tobacco Use    Smoking status: Passive Smoke Exposure - Never Smoker    Smokeless tobacco: Never Used   Substance and Sexual Activity    Alcohol use: Yes     Comment: rarely    Drug use: Not Currently    Sexual activity: Not on file   Lifestyle    Physical activity     Days per week: Not on file     Minutes per session: Not on file    Stress: Not on file   Relationships    Social connections     Talks on phone: Not on file     Gets together: Not on file     Attends Gnosticist service: Not on file     Active member of club or organization: Not on file     Attends meetings of clubs or organizations: Not on file     Relationship status: Not on file    Intimate partner violence     Fear of current or ex partner: Not on file     Emotionally abused: Not on file     Physically abused: Not on file     Forced sexual activity: Not on file   Other Topics Concern    Not on file   Social History Narrative    Not on file     Family History:   History reviewed. No pertinent family history.    Allergies:   Penicillins and Sulfa antibiotics Review of Systems:   General: No fevers or chills. She did have some generalized malaise earlier today  Eyes: No double vision or blurry vision. ENT: No sore throat or runny nose. Cardiovascular: No chest pain or palpitations. Lung: No shortness of breath or cough. Abdomen: No nausea, vomiting, diarrhea, or abdominal pain. Genitourinary: No increased urinary frequency, or dysuria. Musculoskeletal: No muscle aches or pains. Hematologic: No bleeding or bruising. Neurologic: No headache, weakness, numbness, or tingling. Physical Examination :   BP (!) 98/56   Pulse 92   Temp 97.9 °F (36.6 °C) (Oral)   Resp 20   Ht 5' 2\" (1.575 m)   Wt 296 lb 1 oz (134.3 kg)   SpO2 97%   BMI 54.15 kg/m²     Temperature Range: Temp: 97.9 °F (36.6 °C) Temp  Av.3 °F (36.8 °C)  Min: 97.5 °F (36.4 °C)  Max: 99.9 °F (37.7 °C)  General Appearance: Awake, alert, and in no apparent distress  Head: Normocephalic, without obvious abnormality, atraumatic  Eyes: Pupils equal, round, reactive, to light and accommodation; extraocular movements intact; sclera anicteric; conjunctivae pink  ENT: Oropharynx clear, without erythema, exudate, or thrush. Neck: Supple, without lymphadenopathy. Pulmonary/Chest: Clear to auscultation, without wheezes, rales, or rhonchi  Cardiovascular: Regular rate and rhythm without murmurs, rubs, or gallops. Abdomen: soft, non-tender, non-distended, normal bowel sounds, no masses or organomegaly and obese abdomen there is a left lower quadrant ostomy with some output and the midline incision does have some intermittent packing with some drainage appreciated  Extremities: No cyanosis, clubbing, edema, or effusions. Neurologic: No gross sensory or motor deficits. Skin: Warm and dry with no rash.     Medical Decision Making:   I have independently reviewed/ordered the following labs:  CBC with Differential:   Recent Labs     21  0538 21  0536   WBC 14.2* 16.6*   HGB 8.6* 10.6*   HCT pm   FINDINGS:   Chronic pulmonary change with bibasilar atelectasis and stable elevated right hemidiaphragm. Gastric tube with the tip in the proximal mid gastric body. Cultures:     Culture, Blood 1 [[id number]] Collected: 03/06/21 1816   Order Status: Completed Specimen: Blood Updated: 03/09/21 0620    Specimen Description . BLOOD    Special Requests NOT REPORTED    Culture NO GROWTH 3 DAYS     Culture, Urine [[id number]] (Abnormal)  Collected: 03/06/21 1700   Order Status: Completed Specimen: Urine Random Updated: 03/08/21 0923    Specimen Description . Random Urine    Special Requests NOT REPORTED    Culture ESCHERICHIA COLI >[id number] CFU/MLAbnormal    Escherichia coli (1)    Antibiotic Interpretation RADHA Status    amikacin   Final     NOT REPORTED    ampicillin Resistant  Final     >=32   RESISTANT   ampicillin-sulbactam   Final     NOT REPORTED    aztreonam Sensitive  Final     <=1   SUSCEPTIBLE   ceFAZolin Sensitive  Final     <=4   SUSCEPTIBLE   ceFAZolin Sensitive Cefazolin sensitivity results can be used to predict the effectiveness of oral cephalosporins (eg. Cephalexin) in uncomplicated Urinary Tract Infections due to E. coli, K. pneumoniae, and P. mirabilis Final    cefepime   Final     NOT REPORTED    cefTRIAXone Sensitive  Final     <=1   SUSCEPTIBLE   ciprofloxacin Sensitive  Final     1   SUSCEPTIBLE   ertapenem   Final     NOT REPORTED    Confirmatory Extended Spectrum Beta-Lactamase Negative NEGATIVE Final    gentamicin Intermediate  Final     8   INTERMEDIATE   meropenem   Final     NOT REPORTED    nitrofurantoin Sensitive  Final     <=16   SUSCEPTIBLE   tigecycline   Final     NOT REPORTED    tobramycin Intermediate  Final     8   INTERMEDIATE   trimethoprim-sulfamethoxazole Sensitive  Final     <=20   SUSCEPTIBLE   piperacillin-tazobactam Sensitive  Final     8   SUSCEPTIBLE             Thank you for allowing us to participate in the care of this patient. Please call with questions.

## 2021-03-09 NOTE — PROGRESS NOTES
Dr Shanks returned call- ok to consult Dr Leslie Ballard and ok for full anticoagulation if he deems necessary.

## 2021-03-09 NOTE — PROGRESS NOTES
Nutrition Education/Counseling:  Education not indicated   Coordination of Nutrition Care:  Continue to monitor while inpatient    Goals:  PN will meet greater than 75% of estimated nutrition needs       Nutrition Monitoring and Evaluation:   Behavioral-Environmental Outcomes:  None Identified   Food/Nutrient Intake Outcomes:  Diet Advancement/Tolerance, Food and Nutrient Intake, Parenteral Nutrition Intake/Tolerance  Physical Signs/Symptoms Outcomes:  Biochemical Data, GI Status, Fluid Status or Edema, Skin, Weight     Discharge Planning:     Too soon to determine     [address], [facility] [address] [place] [address]  Office Number: [phone]

## 2021-03-09 NOTE — CONSULTS
[facility]    Patient Name:  Burt Mercado    GYL:[id number]       Date of Consult: 3/9/2021  Date of Admission: 2/25/2021    Referring Physician: Reno Gallegos MD  Reason for Consult: atrial fibrillation    HPI: Burt Mercado is a pleasant 67 y.o. female who was admitted post exploratory laparotomy, takedown of colovesical fistula secondary to diverticular disease, rectosigmoid colonic resection, end colostomy creation on 2/25/2021. She developed atrial fibrillation overnight last night. She currently denies any chest pain, tachycardia, dizziness, palpitations, or abdominal pain. She has had some intermittent dyspnea. States that this afternoon she feels better than she did this morning. She is a known patient to our practice for MVR, HTN, CHF, HC, moderate pulmonary HTN, and post operative atrial fibrillation. Cardiology was asked to evaluate.     - Normal coronary arteries documented on a cardiac catheterization performed 3/22/17 which also revealed moderate pulmonary hypertension  - history of moderate to severe MR, status post minimally invasive MVR done 4/12/17 utilizing a 32 mm [employer] annuloplasty ring    - normal LV function, mildly dilated LA, mildly thickened aortic valve, repaired mitral valve with trace MR, trace TR documented on an echocardiogram done 8/14/19    PMH:   Past Medical History:   Diagnosis Date    Acute respiratory failure following trauma and surgery ([address] 4/12/2017    Adiposity 7/21/2016    Arthralgia of multiple joints 7/21/2016    Arthritis     Asthma 6/15/2016    Asthma with acute exacerbation 7/21/2016    Atopic rhinitis 6/15/2016    CAD (coronary artery disease)     CHF (congestive heart failure) ([address] 4/27/2018    Chronic bilateral low back pain without sciatica 6/15/2016    Closed fracture of proximal end of humerus 10/16/2018    COPD (chronic obstructive pulmonary disease) ([address]     Depression 6/15/2016    Diverticulitis 10/1/2018    Diverticulosis of large intestine 6/15/2016    Diverticulosis of large intestine without hemorrhage 1/16/2017    Encounter for mammogram to establish baseline mammogram 7/21/2016    Essential hypertension 6/15/2016    H/O hysterectomy for benign disease 3/3/2013    Heart murmur 7/21/2016    History of blood transfusion     Hyperlipidemia 6/15/2016    Hypothermia due to anesthesia 4/12/2017    Laceration of chest wall 8/22/2017    Mitral incompetence 4/12/2017    Morbid obesity ([address] 6/15/2016    Non-rheumatic mitral regurgitation 3/7/2017    Polyp of sigmoid colon 6/15/2016    Postmenopausal status 6/15/2016    Routine general medical examination at a health care facility 7/21/2016    Special screening for malignant neoplasms, colon 7/21/2016    Special screening for malignant neoplasms, vagina 7/21/2016    Urinary incontinence 6/15/2016     PSH:   Past Surgical History:   Procedure Laterality Date    APPENDECTOMY      BRONCHOSCOPY      CARDIAC CATHETERIZATION  03/22/2017    CARDIAC CATHETERIZATION  11/18*/2016    COLONOSCOPY      ENDOSCOPY, COLON, DIAGNOSTIC      EYE SURGERY Bilateral     cataract    FRACTURE SURGERY      clavicle/shoulder    HYSTERECTOMY      MITRAL VALVE REPAIR      OTHER SURGICAL HISTORY      Minimally invasive MVR 32MMCG future ring/kyle/femoral cannulation    OVARY REMOVAL      SMALL INTESTINE SURGERY N/A 2/25/2021    COLONOSCOPY WITH ABDOMINAL EXPLORATION   WITH BOWEL RESECTION LOW ANTERIOR   REPAIR  COLO VESSICLE FISTULA,   STOMA,  LYSIS OF ADHESIONS performed by Dre Garcia MD at [facility]      arterial bleed     Family HX: History reviewed. No pertinent family history.    Social HX:   Social History     Socioeconomic History    Marital status:      Spouse name: Not on file    Number of children: Not on file    Years of education: Not on file    Highest education level: Not on file   Occupational History    Not on file   Social Needs    Financial resource strain: Not on file    Food insecurity     Worry: Not on file     Inability: Not on file    Transportation needs     Medical: Not on file     Non-medical: Not on file   Tobacco Use    Smoking status: Passive Smoke Exposure - Never Smoker    Smokeless tobacco: Never Used   Substance and Sexual Activity    Alcohol use: Yes     Comment: rarely    Drug use: Not Currently    Sexual activity: Not on file   Lifestyle    Physical activity     Days per week: Not on file     Minutes per session: Not on file    Stress: Not on file   Relationships    Social connections     Talks on phone: Not on file     Gets together: Not on file     Attends Gnosticist service: Not on file     Active member of club or organization: Not on file     Attends meetings of clubs or organizations: Not on file     Relationship status: Not on file    Intimate partner violence     Fear of current or ex partner: Not on file     Emotionally abused: Not on file     Physically abused: Not on file     Forced sexual activity: Not on file   Other Topics Concern    Not on file   Social History Narrative    Not on file        Allergies: Allergies   Allergen Reactions    Penicillins      Pt does not know reaction, too long ago. Tolerates cephalosporins    Sulfa Antibiotics      Pt does not know reaction, too long ago. Home Meds:   Prior to Admission medications    Medication Sig Start Date End Date Taking?  Authorizing Provider   Calcium Carbonate (CALCIUM 600 PO) Take 600 mg by mouth daily   Yes Historical Provider, MD   dextromethorphan-guaiFENesin (MUCINEX DM)  MG per extended release tablet Take 1 tablet by mouth 2 times daily   Yes Historical Provider, MD   famotidine (PEPCID) 20 MG tablet Take 20 mg by mouth daily as needed (heartburn)   Yes Historical Provider, MD   miconazole (MICOTIN) 2 % cream Apply topically 2 times daily as needed   Yes Historical Provider, MD   amiodarone (CORDARONE) 200 MG tablet Take 100 mg by 0.5-2.5 (3) MG/3ML SOLN nebulizer solution Inhale 3 mLs into the lungs every 6 hours as needed    Yes Historical Provider, MD   LUTEIN PO Take 1 capsule by mouth daily    Yes Historical Provider, MD   Melatonin 10 MG TABS Take 10 mg by mouth nightly as needed (sleep)    Yes Historical Provider, MD   metoprolol tartrate (LOPRESSOR) 25 MG tablet Take 25 mg by mouth 2 times daily    Yes Historical Provider, MD   Multiple Vitamins-Minerals (MULTIVITAMIN ADULT PO) Take 1 tablet by mouth every evening    Yes Historical Provider, MD   nystatin ([address]) 865065 UNIT/GM powder Apply topically 3 times daily as needed (under breasts)  5/21/18  Yes Historical Provider, MD   Omega-3 Fatty Acids (FISH OIL PO) Take 1 g by mouth every evening    Yes Historical Provider, MD   pantoprazole (PROTONIX) 40 MG tablet Take 40 mg by mouth daily  10/5/18  Yes Historical Provider, MD   senna-docusate (Юлия Swanson) 8.6-50 MG per tablet Take 1 tablet by mouth every evening    Yes Historical Provider, MD   spironolactone (ALDACTONE) 25 MG tablet Take 25 mg by mouth every morning  7/3/18  Yes Historical Provider, MD   solifenacin (VESICARE) 5 MG tablet Take 5 mg by mouth daily  8/24/18  Yes Historical Provider, MD       Hospital Meds:   Scheduled Meds:   acetaminophen  1,000 mg Oral 3 times per day    piperacillin-tazobactam  3,375 mg Intravenous Q8H    albuterol  2.5 mg Nebulization 4x daily    acetylcysteine  400 mg Inhalation TID    amiodarone  100 mg Oral Daily    metoprolol tartrate  12.5 mg Oral BID    insulin lispro  0-6 Units Subcutaneous 4 times per day    fat emulsion  100 mL Intravenous Daily    heparin (porcine)  5,000 Units Subcutaneous 3 times per day    guaiFENesin  600 mg Oral BID    sucralfate  1 g Oral TID AC    pantoprazole  40 mg Intravenous Daily    fluticasone  1 spray Nasal Daily    atorvastatin  20 mg Oral Daily    buPROPion  150 mg Oral Daily    citalopram  10 mg Oral Daily    spironolactone  25 mg Oral Daily    torsemide  20 mg Oral Daily    sodium chloride flush  10 mL Intravenous 2 times per day      Continuous Infusions:   PN-Adult 2-in-1 Central Line (Standard) 65 mL/hr at 03/08/21 1747    dextrose      dextrose 5% and 0.45% NaCl with KCl 20 mEq 10 mL/hr at 03/06/21 1011     PRN Meds: oxyCODONE, sodium chloride flush, phenol, glucose, dextrose, glucagon (rDNA), dextrose, calcium carbonate, sodium chloride flush, ondansetron    Review of Systems:  AS PER HPI    Physical Exam:     Vital Signs: BP (!) 98/56   Pulse 92   Temp 97.9 °F (36.6 °C) (Oral)   Resp 20   Ht 5' 2\" (1.575 m)   Wt 296 lb 1 oz (134.3 kg)   SpO2 97%   BMI 54.15 kg/m²  O2 Flow Rate (L/min): 4 L/min    Wt. Weight change: -7 oz (-0.198 kg)  I/O:  I/O last 3 completed shifts: In: 3789.2 [P.O.:1680; I.V.:425]  Out: 0170 [Urine:3625; Stool:30]  I/O this shift:  In: 1070 [I.V.:1070]  Out: 750 [Urine:700; Stool:50]  Monitor: atrial fibrillation with controlled vrate. General Appearance: A&Ox3, NAD. Skin: Pink, warm and dry. Pulmonary/Chest: Diminished throughout anteriorly, scattered expiratory wheezes. Cardiovascular: S1S2, irregular, regular rate, AS, negative carotid bruit, negative JVD, negative gallop. Abdomen: BS present, colostomy present, dressing CDI. Extremities: Mild peripheral edema. Labs:     CBC with Differential:    Lab Results   Component Value Date    WBC 16.6 03/09/2021    RBC 3.65 03/09/2021    HGB 10.6 03/09/2021    HCT 35.6 03/09/2021     03/09/2021    MCV 97.5 03/09/2021    MCH 29.0 03/09/2021    MCHC 29.8 03/09/2021    RDW 15.0 03/09/2021   [  BMP:     Lab Results   Component Value Date     03/09/2021    K 3.9 03/09/2021    CL 98 03/09/2021    CO2 24 03/09/2021    BUN 22 03/09/2021    CREATININE 1.04 03/09/2021     MG/PHOS:    Lab Results   Component Value Date    MG 2.0 03/09/2021    PHOS 3.6 03/09/2021     CT chest:   Impression   1.  No evidence for acute pulmonary embolism.       2.

## 2021-03-09 NOTE — PLAN OF CARE
Nutrition Problem #1: Mild malnutrition, In context of acute illness or injury  Intervention: Food and/or Nutrient Delivery: Continue Current Diet, Continue Current Parenteral Nutrition  Nutritional Goals: PN will meet greater than 75% of estimated nutrition needs

## 2021-03-09 NOTE — CARE COORDINATION
Spoke with Travis Baez at [employer]. [phone]. Gave update on patient. Precert will be started today. Patient is now on general diet. TPN is being weaned.

## 2021-03-09 NOTE — PROGRESS NOTES
Pulmonary Critical Care Progress Note  Meenu Avalos MD     Patient seen for the follow up of acute respiratory insufficiency, CHF, asthma, JAYDEN    Subjective:  She is sitting up in bed, RN at bedside. She just returned from CT. CT did not show any evidence of acute abdominal process or pulmonary embolism. She remains on oxygen via nasal cannula at 4 L. Shortness of breath is not much change. She has no chest pain. She does have an occasional harsh nonproductive cough. RN reports she tried to get her up out of the bed into the chair this morning but was unable secondary to weakness. Examination:  Vitals: BP 99/86   Pulse 99   Temp 98.3 °F (36.8 °C) (Oral)   Resp 18   Ht 5' 2\" (1.575 m)   Wt 296 lb 1 oz (134.3 kg)   SpO2 99%   BMI 54.15 kg/m²   General appearance: alert and cooperative with exam, resting in bed  Neck: No JVD  Lungs: Moderate air exchange, no wheezing  Heart: regular rate and rhythm, S1, S2 normal, no gallop  Abdomen: Soft, non tender, + BS  Extremities: no cyanosis or clubbing. Trace edema    LABs:  CBC:   Recent Labs     03/08/21  0538 03/09/21  0536   WBC 14.2* 16.6*   HGB 8.6* 10.6*   HCT 29.4* 35.6*    283     BMP:   Recent Labs     03/08/21  0538 03/09/21  0536    135   K 3.8 3.9   CO2 27 24   BUN 21 22   CREATININE 0.92* 1.04*   LABGLOM 59* 52*   GLUCOSE 169* 181*      Ref. Range 3/6/2021 14:32   Procalcitonin Latest Ref Range: <0.09 ng/mL 0.18 (H)     Radiology:  X-ray chest:  3/7/2021  Atelectasis and small left pleural effusion with mild pulmonary vascular congestion, unchanged       KUB:  3/5/2021  Multiple dilated gas-filled small bowel loops with scattered areas of gas in   the colon.  There are more dilated loops appreciated compared to prior.    Differential remains ileus versus obstruction although the progression is   more worrisome for obstruction       Impression:  · Acute on chronic hypoxic respiratory insufficiency, on nocturnal oxygen  · Mild

## 2021-03-09 NOTE — PROGRESS NOTES
sigmoid colon, Postmenopausal status, Routine general medical examination at a health care facility, Special screening for malignant neoplasms, colon, Special screening for malignant neoplasms, vagina, and Urinary incontinence. has a past surgical history that includes Appendectomy; Mitral valve repair; Hysterectomy; bronchoscopy; Cardiac catheterization (03/22/2017); Cardiac catheterization (11/18*/2016); Wound Exploration; other surgical history; Ovary removal; eye surgery (Bilateral); Colonoscopy; Endoscopy, colon, diagnostic; fracture surgery; and Small intestine surgery (N/A, 2/25/2021). Restrictions  Restrictions/Precautions  Restrictions/Precautions: General Precautions, Surgical Protocols, Fall Risk, Up as Tolerated  Required Braces or Orthoses?: No  Required Braces or Orthoses  Other: Abdominal Binder  Position Activity Restriction  Other position/activity restrictions: Up with assist, abd sx/incision, colostomy, IV  Subjective   General  Chart Reviewed: Yes  Response To Previous Treatment: Patient with no complaints from previous session. Family / Caregiver Present: No  Subjective  Subjective: Pt alert and agreeable to PT treatment. Patient reports pain of 2/10 in right foot. Patient states pain is \"on the top of my foot\". General Comment  Comments: CATHY Russell reports patient medically stable for PT treatment. RN reports recent xray of foot shows R foot heel spur. Pain Screening  Patient Currently in Pain: Yes  Vital Signs  Patient Currently in Pain: Yes  Pre Treatment Pain Screening  Pain at present: 2  Scale Used: Numeric Score  Intervention List: Patient able to continue with treatment    Orientation  Orientation  Overall Orientation Status: Within Functional Limits  Cognition      Objective   Bed mobility  Scooting: Minimal assistance  Transfers  Sit to Stand:  Moderate Assistance  Stand to sit: Moderate Assistance  Bed to Chair: Moderate assistance  Comment: Patient requires encouragement and

## 2021-03-09 NOTE — PLAN OF CARE
Problem: Pain:  Goal: Pain level will decrease  Description: Pain level will decrease  3/9/2021 0134 by Isiah Hernandez RN  Outcome: Ongoing     Problem: Pain:  Goal: Control of acute pain  Description: Control of acute pain  Outcome: Ongoing     Problem: Pain:  Goal: Control of chronic pain  Description: Control of chronic pain  Outcome: Ongoing     Problem: Falls - Risk of:  Goal: Will remain free from falls  Description: Will remain free from falls  3/9/2021 0134 by Isiah Hernandez RN  Outcome: Ongoing  Note: Pt will remain free from falls. Fall risk assessment completed. Call light and personal items within reach. Bed locked, in lowest position, side rails up x2. Room free of clutter. Bed alarm activated. Falling star sign posted. Hourly rounding implemented. Will continue to monitor. Problem: Falls - Risk of:  Goal: Absence of physical injury  Description: Absence of physical injury  Outcome: Ongoing     Problem: Skin Integrity:  Goal: Will show no infection signs and symptoms  Description: Will show no infection signs and symptoms  Outcome: Ongoing     Problem: Skin Integrity:  Goal: Absence of new skin breakdown  Description: Absence of new skin breakdown  Outcome: Ongoing     Problem: Discharge Planning:  Goal: Discharged to appropriate level of care  Description: Discharged to appropriate level of care  Outcome: Ongoing     Problem: Body Image - Altered:  Goal: Expressions of positive opinion of body image will increase  Description: Expressions of positive opinion of body image will increase  Outcome: Ongoing     Problem:  Bowel Function - Altered:  Goal: Bowel elimination is within specified parameters  Description: Bowel elimination is within specified parameters  Outcome: Ongoing     Problem: Fluid Volume - Imbalance:  Goal: Absence of imbalanced fluid volume signs and symptoms  Description: Absence of imbalanced fluid volume signs and symptoms  Outcome: Ongoing     Problem: Infection - Surgical Site: Goal: Will show no infection signs and symptoms  Description: Will show no infection signs and symptoms  Outcome: Ongoing     Problem: Infection - Surgical Site:  Goal: Demonstration of wound healing without infection will improve  Description: Demonstration of wound healing without infection will improve  Outcome: Ongoing     Problem: Venous Thromboembolism:  Goal: Will show no signs or symptoms of venous thromboembolism  Description: Will show no signs or symptoms of venous thromboembolism  Outcome: Ongoing     Problem: Venous Thromboembolism:  Goal: Absence of signs or symptoms of impaired coagulation  Description: Absence of signs or symptoms of impaired coagulation  Outcome: Ongoing

## 2021-03-09 NOTE — PROGRESS NOTES
Subjective:     Follow-up CHF  Denies any shortness of breath tachypnea occasional cough no orthopnea  ROS  Fever no chills no  or cardiopulmonary complaints at present no TIA no bleeding no headache no sore throat no skin lesions except the midline incision abdominal no headache  physical exam  General Appearance: in no acute distress and alert  Skin: warm and dry, no rash or erythema  Head: normocephalic and atraumatic  Eyes: pupils equal, round, and reactive to light and sclera anicteric  Neck: neck supple and non tender without mass   Pulmonary/Chest: clear to auscultation bilaterally- no wheezes, rales or rhonchi, normal air movement, no respiratory distress  Cardiovascular: normal rate, irregular rhythm, normal S1 and S2, no gallops, intact distal pulses and no carotid bruits  Abdomen: soft, non-tender, non-distended and bowel sounds midline incision noted no erythema mild swelling around his serosanguineous discharge  Extremities: no edema and good pulses no Homans' sign  Neurologic: Alert oriented x3 with no focal    BP (!) 134/56   Pulse 99   Temp 98.1 °F (36.7 °C) (Oral)   Resp 16   Ht 5' 2\" (1.575 m)   Wt 296 lb 8 oz (134.5 kg)   SpO2 98%   BMI 54.23 kg/m²     CBC:   Lab Results   Component Value Date    WBC 14.2 03/08/2021    RBC 2.99 03/08/2021    HGB 8.6 03/08/2021    HCT 29.4 03/08/2021    MCV 98.3 03/08/2021    MCH 28.8 03/08/2021    MCHC 29.3 03/08/2021    RDW 14.9 03/08/2021     03/08/2021    MPV 9.9 03/08/2021     BMP:    Lab Results   Component Value Date     03/08/2021    K 3.8 03/08/2021    CL 98 03/08/2021    CO2 27 03/08/2021    BUN 21 03/08/2021    LABALBU 2.5 03/06/2021    CREATININE 0.92 03/08/2021    CALCIUM 8.3 03/08/2021    GFRAA >60 03/08/2021    LABGLOM 59 03/08/2021    GLUCOSE 169 03/08/2021        Assessment:  Patient Active Problem List   Diagnosis    Acute respiratory failure following trauma and surgery (HCC)    Adiposity    Arthralgia of multiple joints

## 2021-03-09 NOTE — PROGRESS NOTES
Writer notified Dr Norma Peterson of new consult. New orders received for decreased Lopressor dose with parameters and increased frequency of Amiodarone. No full anticoagulation ordered at this time.

## 2021-03-09 NOTE — PROGRESS NOTES
Dr Kevin Lyons returned call- instructed writer to call Dr Troy Dominguez regarding cardiology consult and need for full anticoagulation. Call placed to Dr Troy Dominguez- colorectal surgeon.

## 2021-03-09 NOTE — PROGRESS NOTES
[facility] SURGERY  PROGRESS NOTE    Patient Name: Bakari Padilla  Patient MRN: [id number]  Date: 3/9/2021; 6:07 AM    CC: Nausea    Subjective:   Patient seen and chart reviewed. WBC up to 16.6 this morning. Currently being treated for E. Coli UTI with cipro. Rising leukocytosis this AM. Afebrile, normotensive, normal sinus rhythm but episode of afib overnight. Her resp status has worsened over the past 24-48 hours and she is now on 4 L NC. She has a new cough. Voiding with good uop. No ostomy output charted. Denies chest pain or shortness of breath. Denies nausea, vomiting, or diarrhea. Pain controlled. Ambulating. Objective:    Vitals:    03/09/21 0414   BP: (!) 135/95   Pulse: 104   Resp: 20   Temp: 97.5 °F (36.4 °C)   SpO2: (!) 89%        General: No acute distress. A&Ox3. HEENT:  NC/AT. Conjunctiva moist without icterus. Ears are symmetric. Nares are patent. Oral mucus membranes are moist.  Neck:  Supple. No LAD. Cardiovascular:  Regular rate and rhythm. Warm, well perfused. Respiratory:  Equal chest rise bilaterally. No wheezes, stridor, or increased work of breathing. Abdomen:  soft, nd, attp. Incision slight erythema surroudning no induration, few staples removed, serous fluid drained no opaque/purulent/milky fluid seen. Stoma pink with flatus and stool in appliance   Neuro: Motor and sensory grossly intact. Extremities:  Warm, dry, and well perfused. Limbs without apparent deformity. Skin:  No rashes or lesions. I/O last 3 completed shifts: In: 3309.2 [P.O.:1200;  I.V.:425]  Out: 3500 [Urine:3500]    Labs:    CBC:   Lab Results   Component Value Date    WBC 16.6 03/09/2021    RBC 3.65 03/09/2021    HGB 10.6 03/09/2021    HCT 35.6 03/09/2021    MCV 97.5 03/09/2021    MCH 29.0 03/09/2021    MCHC 29.8 03/09/2021    RDW 15.0 03/09/2021     03/09/2021    MPV 9.7 03/09/2021     BMP:    Lab Results   Component Value Date     03/08/2021    K 3.8 03/08/2021    CL 98 03/08/2021    CO2 27 03/08/2021    BUN 21 03/08/2021    LABALBU 2.5 03/06/2021    CREATININE 0.92 03/08/2021    CALCIUM 8.3 03/08/2021    GFRAA >60 03/08/2021    LABGLOM 59 03/08/2021    GLUCOSE 169 03/08/2021     CRP (3/8): 183.2    Pathology (2/25/21):    Source of Specimen   1: SIGMOID AND UPPER RECTUM     SIGMOID COLON AND UPPER RECTUM, SEGMENTAL RESECTION:             -  PERFORATED DIVERTICULITIS WITH SEROSAL ADHESIONS AND   ADJACENT MURAL ABSCESS.        -  MARGINS APPEAR VIABLE.        -  BENIGN REACTIVE MESENTERIC LYMPH NODES. Gross Description   \"NOE LUIS ANTONIO, SIGMOID AND UPPER RECTUM\" 15.0 cm long x 4.0 cm in circumference segment of colon with a large amount of mesenteric fat. The serosa is pink-tan with adhesions and in an area of dense adhesions, there is a 0.2 cm transmural defect that is 4.0 cm from the closest margin.  The mucosa is pink-tan with diverticula up to 1.5 cm and in the center of the segment, away from the aforementioned defect, there is a region of cavitation within the wall and fat consistent with abscess. Ephriam Hastings are no masses.  The wall ranges in thickness from 0.2 to 0.8 cm with no marked stricture.  Within the fat there are a few rubbery nodes up to 0.4 cm.  Cassette summary:  \"A\" resection margins shave, \"B-C\" transmural defect, \"D\" separate abscess and representative nodes. Radiology:  Xr Chest 1 View  Result Date: 3/7/2021  Interval placement of right sided PICC line with tip within the mid SVC. The remainder of the findings are stable. Assessment/Plan:     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 · Concerned patient is becoming septic. Concern for pna but intra-abdominal abscess, soft tissue abscess, and PE not excluded. In addition, patient also has indwelling Ornelas with known colovesicular fistula and urine cultures positive for E. Coli.  Has been on cipro since 3/6 and wbc continues to worsen. Stoma is perfused, without purulence or necrosis and she denies abdominal pain. I have ordered CT abdomen/pelvis with IV/PO contrast and CT PE study. I will broaden abx coverage to vanc and zosyn. · Cardiology consult for afib  · ID consult  · Monitor ostomy output. · Pain and nausea control PRN  · Make NPO  · Continue TPN; Total IVF: 125cc/hr  · VTE ppx: Hep TID  · Appreciate IM assistance with medical management. · Wound care: continue daily packing changes. Ok to shower. Jocelin Lan MD  General Surgery PGY3  Available via [employer]  Attending: Johnny Bennett MD    I Dr. Carlos Mcgraw saw and examined the patient. I have edited the above and agree with the above. Nehal Ricks  Colorectal Surgery

## 2021-03-09 NOTE — PROGRESS NOTES
Occupational Therapy  Facility/Department: [facility] PROGRESSIVE CARE  Daily Treatment Note  NAME: Radha Marcelo  : 1945  MRN: [id number]    Date of Service: 3/9/2021    Discharge Recommendations:  [address]    Patient would benefit from SNF for continued occupational therapy to increase independence with  ADL of bathing, dressing, toileting and grooming. Writer recommending SNF placement for for activity tolerance and strength which will increase independence with ADL's coordinated with bed mobility and chair transfers. Continued skilled OT services to address decreased safety awareness with ADL and IADL tasks and for education and increased independence with DME and AE for fall prevention and ec/ws techniques prior to d/c home. OT Equipment Recommendations  Equipment Needed: Yes  Mobility Devices: ADL Assistive Devices  ADL Assistive Devices: Reacher;Long-handled Sponge;Long-handled [place];Emergency Alert System    Assessment   Performance deficits / Impairments: Decreased functional mobility ; Decreased ADL status; Decreased safe awareness;Decreased endurance;Decreased balance;Decreased strength;Decreased sensation;Decreased high-level IADLs  Prognosis: Good  OT Education: OT Role;Plan of Care;Precautions; Energy Conservation; ADL Adaptive Strategies  REQUIRES OT FOLLOW UP: Yes  Activity Tolerance  Activity Tolerance: Patient limited by pain;Treatment limited secondary to medical complications (free text)  Activity Tolerance: Pt recieving care upon first attempt, second attempt RN stated she was unable to transfer pt to chair secondary to R foot pain that began a couple days ago per pt. Unable to weight bear. Xray ordered. Will not mobilize or transfer at this time while waiting for results from xray. Safety Devices  Safety Devices in place: Yes  Type of devices: Left in bed;Bed alarm in place;Call light within reach;Nurse notified; All fall risk precautions in place         Patient Diagnosis(es): There were no encounter diagnoses. has a past medical history of Acute respiratory failure following trauma and surgery ([facility] [address] Adiposity, Arthralgia of multiple joints, Arthritis, Asthma, Asthma with acute exacerbation, Atopic rhinitis, CAD (coronary artery disease), CHF (congestive heart failure) (HCC), Chronic bilateral low back pain without sciatica, Closed fracture of proximal end of humerus, COPD (chronic obstructive pulmonary disease) ([address] Depression, Diverticulitis, Diverticulosis of large intestine, Diverticulosis of large intestine without hemorrhage, Encounter for mammogram to establish baseline mammogram, Essential hypertension, H/O hysterectomy for benign disease, Heart murmur, History of blood transfusion, Hyperlipidemia, Hypothermia due to anesthesia, Laceration of chest wall, Mitral incompetence, Morbid obesity ([facility] [address] Non-rheumatic mitral regurgitation, Polyp of sigmoid colon, Postmenopausal status, Routine general medical examination at a health care facility, Special screening for malignant neoplasms, colon, Special screening for malignant neoplasms, vagina, and Urinary incontinence. has a past surgical history that includes Appendectomy; Mitral valve repair; Hysterectomy; bronchoscopy; Cardiac catheterization (03/22/2017); Cardiac catheterization (11/18*/2016); Wound Exploration; other surgical history; Ovary removal; eye surgery (Bilateral); Colonoscopy; Endoscopy, colon, diagnostic; fracture surgery; and Small intestine surgery (N/A, 2/25/2021). Restrictions  Restrictions/Precautions  Restrictions/Precautions: General Precautions, Surgical Protocols, Fall Risk, Up as Tolerated  Required Braces or Orthoses?: No  Required Braces or Orthoses  Other: Abdominal Binder  Position Activity Restriction  Other position/activity restrictions:  Up with assist, abd sx/incision, colostomy, IV  Subjective   General  Chart Reviewed: Yes  Patient assessed for rehabilitation services?: Co-treatment   Time In 1030         Time Out [id number])         Minutes 11              Upon writer exit, call light within reach, pt retired to bed. All lines intact and patient positioned comfortably. All patient needs addressed prior to ending therapy session. Chart reviewed prior to treatment and patient is agreeable for therapy. RN reports patient is medically stable for therapy treatment this date.       CORNELIO Caballero

## 2021-03-09 NOTE — PROGRESS NOTES
12 lead EKG obtained- Atrial Fibrillation confirmed. The patient denies dizziness or palpitations. Call placed to notify Dr Vikash Lara of change.

## 2021-03-10 ENCOUNTER — APPOINTMENT (OUTPATIENT)
Dept: CT IMAGING | Age: 76
DRG: 981 | End: 2021-03-10
Attending: COLON & RECTAL SURGERY
Payer: MEDICARE

## 2021-03-10 ENCOUNTER — APPOINTMENT (OUTPATIENT)
Dept: GENERAL RADIOLOGY | Age: 76
DRG: 981 | End: 2021-03-10
Attending: COLON & RECTAL SURGERY
Payer: MEDICARE

## 2021-03-10 LAB
ABSOLUTE EOS #: 0.44 K/UL (ref 0–0.4)
ABSOLUTE IMMATURE GRANULOCYTE: 1.09 K/UL (ref 0–0.3)
ABSOLUTE LYMPH #: 1.09 K/UL (ref 1–4.8)
ABSOLUTE MONO #: 1.31 K/UL (ref 0.2–0.8)
ANION GAP SERPL CALCULATED.3IONS-SCNC: 10 MMOL/L (ref 9–17)
BASOPHILS # BLD: 1 %
BASOPHILS ABSOLUTE: 0.22 K/UL (ref 0–0.2)
BUN BLDV-MCNC: 23 MG/DL (ref 8–23)
BUN/CREAT BLD: 21 (ref 9–20)
CALCIUM SERPL-MCNC: 8.2 MG/DL (ref 8.6–10.4)
CHLORIDE BLD-SCNC: 98 MMOL/L (ref 98–107)
CO2: 30 MMOL/L (ref 20–31)
CREAT SERPL-MCNC: 1.07 MG/DL (ref 0.5–0.9)
DIFFERENTIAL TYPE: ABNORMAL
EOSINOPHILS RELATIVE PERCENT: 2 % (ref 1–4)
GFR AFRICAN AMERICAN: >60 ML/MIN
GFR NON-AFRICAN AMERICAN: 50 ML/MIN
GFR SERPL CREATININE-BSD FRML MDRD: ABNORMAL ML/MIN/{1.73_M2}
GFR SERPL CREATININE-BSD FRML MDRD: ABNORMAL ML/MIN/{1.73_M2}
GLUCOSE BLD-MCNC: 148 MG/DL (ref 65–105)
GLUCOSE BLD-MCNC: 151 MG/DL (ref 65–105)
GLUCOSE BLD-MCNC: 151 MG/DL (ref 65–105)
GLUCOSE BLD-MCNC: 166 MG/DL (ref 65–105)
GLUCOSE BLD-MCNC: 181 MG/DL (ref 65–105)
GLUCOSE BLD-MCNC: 192 MG/DL (ref 70–99)
HCT VFR BLD CALC: 29.5 % (ref 36.3–47.1)
HEMOGLOBIN: 8.9 G/DL (ref 11.9–15.1)
IMMATURE GRANULOCYTES: 5 %
INR BLD: 1.2
LYMPHOCYTES # BLD: 5 % (ref 24–44)
MAGNESIUM: 2.2 MG/DL (ref 1.6–2.6)
MCH RBC QN AUTO: 28.7 PG (ref 25.2–33.5)
MCHC RBC AUTO-ENTMCNC: 30.2 G/DL (ref 28.4–34.8)
MCV RBC AUTO: 95.2 FL (ref 82.6–102.9)
MONOCYTES # BLD: 6 % (ref 1–7)
MORPHOLOGY: ABNORMAL
NRBC AUTOMATED: 0 PER 100 WBC
PDW BLD-RTO: 15.1 % (ref 11.8–14.4)
PHOSPHORUS: 3.4 MG/DL (ref 2.6–4.5)
PLATELET # BLD: 324 K/UL (ref 138–453)
PLATELET ESTIMATE: ABNORMAL
PMV BLD AUTO: 10.1 FL (ref 8.1–13.5)
POTASSIUM SERPL-SCNC: 4 MMOL/L (ref 3.7–5.3)
PROTHROMBIN TIME: 14.9 SEC (ref 11.5–14.2)
RBC # BLD: 3.1 M/UL (ref 3.95–5.11)
RBC # BLD: ABNORMAL 10*6/UL
SARS-COV-2: NORMAL
SARS-COV-2: NOT DETECTED
SEG NEUTROPHILS: 81 % (ref 36–66)
SEGMENTED NEUTROPHILS ABSOLUTE COUNT: 17.65 K/UL (ref 1.8–7.7)
SODIUM BLD-SCNC: 138 MMOL/L (ref 135–144)
SOURCE: NORMAL
WBC # BLD: 21.8 K/UL (ref 3.5–11.3)
WBC # BLD: ABNORMAL 10*3/UL

## 2021-03-10 PROCEDURE — 6370000000 HC RX 637 (ALT 250 FOR IP): Performed by: INTERNAL MEDICINE

## 2021-03-10 PROCEDURE — 6360000002 HC RX W HCPCS: Performed by: STUDENT IN AN ORGANIZED HEALTH CARE EDUCATION/TRAINING PROGRAM

## 2021-03-10 PROCEDURE — 6360000002 HC RX W HCPCS: Performed by: INTERNAL MEDICINE

## 2021-03-10 PROCEDURE — C9113 INJ PANTOPRAZOLE SODIUM, VIA: HCPCS | Performed by: INTERNAL MEDICINE

## 2021-03-10 PROCEDURE — 2580000003 HC RX 258: Performed by: STUDENT IN AN ORGANIZED HEALTH CARE EDUCATION/TRAINING PROGRAM

## 2021-03-10 PROCEDURE — U0003 INFECTIOUS AGENT DETECTION BY NUCLEIC ACID (DNA OR RNA); SEVERE ACUTE RESPIRATORY SYNDROME CORONAVIRUS 2 (SARS-COV-2) (CORONAVIRUS DISEASE [COVID-19]), AMPLIFIED PROBE TECHNIQUE, MAKING USE OF HIGH THROUGHPUT TECHNOLOGIES AS DESCRIBED BY CMS-2020-01-R: HCPCS

## 2021-03-10 PROCEDURE — 87205 SMEAR GRAM STAIN: CPT

## 2021-03-10 PROCEDURE — 6370000000 HC RX 637 (ALT 250 FOR IP): Performed by: STUDENT IN AN ORGANIZED HEALTH CARE EDUCATION/TRAINING PROGRAM

## 2021-03-10 PROCEDURE — 94761 N-INVAS EAR/PLS OXIMETRY MLT: CPT

## 2021-03-10 PROCEDURE — 6370000000 HC RX 637 (ALT 250 FOR IP): Performed by: NURSE PRACTITIONER

## 2021-03-10 PROCEDURE — 87040 BLOOD CULTURE FOR BACTERIA: CPT

## 2021-03-10 PROCEDURE — 72194 CT PELVIS W/O & W/DYE: CPT

## 2021-03-10 PROCEDURE — 6370000000 HC RX 637 (ALT 250 FOR IP): Performed by: COLON & RECTAL SURGERY

## 2021-03-10 PROCEDURE — 2060000000 HC ICU INTERMEDIATE R&B

## 2021-03-10 PROCEDURE — 6360000004 HC RX CONTRAST MEDICATION: Performed by: COLON & RECTAL SURGERY

## 2021-03-10 PROCEDURE — 97530 THERAPEUTIC ACTIVITIES: CPT

## 2021-03-10 PROCEDURE — 87150 DNA/RNA AMPLIFIED PROBE: CPT

## 2021-03-10 PROCEDURE — 87070 CULTURE OTHR SPECIMN AEROBIC: CPT

## 2021-03-10 PROCEDURE — 71045 X-RAY EXAM CHEST 1 VIEW: CPT

## 2021-03-10 PROCEDURE — 85025 COMPLETE CBC W/AUTO DIFF WBC: CPT

## 2021-03-10 PROCEDURE — 84100 ASSAY OF PHOSPHORUS: CPT

## 2021-03-10 PROCEDURE — 2700000000 HC OXYGEN THERAPY PER DAY

## 2021-03-10 PROCEDURE — 80048 BASIC METABOLIC PNL TOTAL CA: CPT

## 2021-03-10 PROCEDURE — 82947 ASSAY GLUCOSE BLOOD QUANT: CPT

## 2021-03-10 PROCEDURE — 2580000003 HC RX 258: Performed by: COLON & RECTAL SURGERY

## 2021-03-10 PROCEDURE — 83735 ASSAY OF MAGNESIUM: CPT

## 2021-03-10 PROCEDURE — 85610 PROTHROMBIN TIME: CPT

## 2021-03-10 PROCEDURE — 36415 COLL VENOUS BLD VENIPUNCTURE: CPT

## 2021-03-10 PROCEDURE — U0005 INFEC AGEN DETEC AMPLI PROBE: HCPCS

## 2021-03-10 PROCEDURE — 99231 SBSQ HOSP IP/OBS SF/LOW 25: CPT | Performed by: NURSE PRACTITIONER

## 2021-03-10 RX ORDER — 0.9 % SODIUM CHLORIDE 0.9 %
500 INTRAVENOUS SOLUTION INTRAVENOUS ONCE
Status: COMPLETED | OUTPATIENT
Start: 2021-03-10 | End: 2021-03-10

## 2021-03-10 RX ADMIN — METOPROLOL TARTRATE 25 MG: 25 TABLET, FILM COATED ORAL at 09:50

## 2021-03-10 RX ADMIN — FLUTICASONE PROPIONATE 1 SPRAY: 50 SPRAY, METERED NASAL at 09:52

## 2021-03-10 RX ADMIN — HEPARIN SODIUM 5000 UNITS: 5000 INJECTION INTRAVENOUS; SUBCUTANEOUS at 05:56

## 2021-03-10 RX ADMIN — ACETAMINOPHEN 1000 MG: 500 TABLET ORAL at 14:40

## 2021-03-10 RX ADMIN — SUCRALFATE 1 G: 1 TABLET ORAL at 05:55

## 2021-03-10 RX ADMIN — ATORVASTATIN CALCIUM 20 MG: 20 TABLET, FILM COATED ORAL at 09:50

## 2021-03-10 RX ADMIN — ACETAMINOPHEN 1000 MG: 500 TABLET ORAL at 05:56

## 2021-03-10 RX ADMIN — CITALOPRAM HYDROBROMIDE 10 MG: 10 TABLET ORAL at 09:51

## 2021-03-10 RX ADMIN — PANTOPRAZOLE SODIUM 40 MG: 40 INJECTION, POWDER, FOR SOLUTION INTRAVENOUS at 09:50

## 2021-03-10 RX ADMIN — SODIUM CHLORIDE 500 ML: 0.9 INJECTION, SOLUTION INTRAVENOUS at 17:54

## 2021-03-10 RX ADMIN — AMIODARONE HYDROCHLORIDE 200 MG: 200 TABLET ORAL at 09:50

## 2021-03-10 RX ADMIN — ONDANSETRON HYDROCHLORIDE 4 MG: 2 SOLUTION INTRAMUSCULAR; INTRAVENOUS at 16:19

## 2021-03-10 RX ADMIN — SODIUM CHLORIDE, PRESERVATIVE FREE 10 ML: 5 INJECTION INTRAVENOUS at 22:35

## 2021-03-10 RX ADMIN — SUCRALFATE 1 G: 1 TABLET ORAL at 12:21

## 2021-03-10 RX ADMIN — INSULIN LISPRO 1 UNITS: 100 INJECTION, SOLUTION INTRAVENOUS; SUBCUTANEOUS at 05:56

## 2021-03-10 RX ADMIN — GUAIFENESIN 600 MG: 600 TABLET, EXTENDED RELEASE ORAL at 09:51

## 2021-03-10 RX ADMIN — INSULIN LISPRO 1 UNITS: 100 INJECTION, SOLUTION INTRAVENOUS; SUBCUTANEOUS at 13:37

## 2021-03-10 RX ADMIN — SPIRONOLACTONE 25 MG: 25 TABLET ORAL at 09:51

## 2021-03-10 RX ADMIN — IOPAMIDOL 20 ML: 755 INJECTION, SOLUTION INTRAVENOUS at 17:52

## 2021-03-10 RX ADMIN — PIPERACILLIN AND TAZOBACTAM 3375 MG: 3; .375 INJECTION, POWDER, LYOPHILIZED, FOR SOLUTION INTRAVENOUS at 05:56

## 2021-03-10 RX ADMIN — HEPARIN SODIUM 5000 UNITS: 5000 INJECTION INTRAVENOUS; SUBCUTANEOUS at 22:34

## 2021-03-10 RX ADMIN — TORSEMIDE 20 MG: 20 TABLET ORAL at 09:51

## 2021-03-10 RX ADMIN — GUAIFENESIN 600 MG: 600 TABLET, EXTENDED RELEASE ORAL at 22:34

## 2021-03-10 RX ADMIN — BUPROPION HYDROCHLORIDE 150 MG: 150 TABLET, EXTENDED RELEASE ORAL at 09:50

## 2021-03-10 RX ADMIN — WARFARIN SODIUM 5 MG: 5 TABLET ORAL at 18:36

## 2021-03-10 RX ADMIN — SODIUM CHLORIDE, PRESERVATIVE FREE 10 ML: 5 INJECTION INTRAVENOUS at 09:51

## 2021-03-10 RX ADMIN — INSULIN LISPRO 1 UNITS: 100 INJECTION, SOLUTION INTRAVENOUS; SUBCUTANEOUS at 01:28

## 2021-03-10 RX ADMIN — INSULIN LISPRO 1 UNITS: 100 INJECTION, SOLUTION INTRAVENOUS; SUBCUTANEOUS at 18:46

## 2021-03-10 RX ADMIN — METOPROLOL TARTRATE 25 MG: 25 TABLET, FILM COATED ORAL at 22:34

## 2021-03-10 RX ADMIN — ACETAMINOPHEN 1000 MG: 500 TABLET ORAL at 22:34

## 2021-03-10 RX ADMIN — HEPARIN SODIUM 5000 UNITS: 5000 INJECTION INTRAVENOUS; SUBCUTANEOUS at 16:20

## 2021-03-10 ASSESSMENT — PAIN DESCRIPTION - PROGRESSION

## 2021-03-10 ASSESSMENT — PAIN SCALES - GENERAL
PAINLEVEL_OUTOF10: 3
PAINLEVEL_OUTOF10: 2
PAINLEVEL_OUTOF10: 3
PAINLEVEL_OUTOF10: 5
PAINLEVEL_OUTOF10: 0

## 2021-03-10 NOTE — PROGRESS NOTES
PICC line removed from R arm and tip sent to lab for culture. Patient tolerated procedure well  until after removed. Then patient became nauseated and did spit up small amount of thick sputum.

## 2021-03-10 NOTE — PROGRESS NOTES
Pt complains of cough and intermittent chills  Mild cough  Afebrile  A flutter  With well controlled HR  BP is normal  No JVD  Bibasilar rales  Irregular  No peripheral edema    Will increase metoprolol  Continue diuretics  Continue gentle anticoagulation   Leukocytosis and Abx management as per ID

## 2021-03-10 NOTE — PROGRESS NOTES
Subjective:     Follow-up CHF  No shortness of breath occasional cough no chest pain no palpitation  ROS  Fever no chills no  or GI complaints except with abdominal pain, no cardiopulmonary complaints except cough and mild shortness of breath no TIA no bleeding no headache no sore throat no skin lesions no polyuria no polydipsia  physical exam  General Appearance: alert and oriented to person, place and time and in no acute distress  Skin: warm and dry, no rash or erythema  Head: normocephalic and atraumatic  Eyes: pupils equal, round, and reactive to light, conjunctivae normal and sclera anicteric    Neck: neck supple and non tender without mass   Pulmonary/Chest: clear to auscultation bilaterally- no wheezes, rales or rhonchi, normal air movement, no respiratory distressdecreased at b ases  Cardiovascular: normal rate, regular rhythm, normal S1 and S2, no gallops, intact distal pulses and no carotid bruits  Abdomen: soft, non-tender, non-distended, normal bowel sounds, no masses or organomegaly  Extremities: trace edema good pulses no alfonso sign     Neurologic: alert oriented x 3 mo focal deficit    BP (!) 102/46   Pulse 100   Temp 98.3 °F (36.8 °C) (Oral)   Resp 18   Ht 5' 2\" (1.575 m)   Wt 296 lb 1 oz (134.3 kg)   SpO2 94%   BMI 54.15 kg/m²     CBC:   Lab Results   Component Value Date    WBC 16.6 03/09/2021    RBC 3.65 03/09/2021    HGB 10.6 03/09/2021    HCT 35.6 03/09/2021    MCV 97.5 03/09/2021    MCH 29.0 03/09/2021    MCHC 29.8 03/09/2021    RDW 15.0 03/09/2021     03/09/2021    MPV 9.7 03/09/2021     BMP:    Lab Results   Component Value Date     03/09/2021    K 3.9 03/09/2021    CL 98 03/09/2021    CO2 24 03/09/2021    BUN 22 03/09/2021    LABALBU 2.5 03/06/2021    CREATININE 1.04 03/09/2021    CALCIUM 8.6 03/09/2021    GFRAA >60 03/09/2021    LABGLOM 52 03/09/2021    GLUCOSE 181 03/09/2021        Assessment:  Patient Active Problem List   Diagnosis    Acute respiratory failure following trauma and surgery ([facility] [address]    Adiposity    Arthralgia of multiple joints    Asthma    Asthma with acute exacerbation    Atopic rhinitis    CH[facility]congestive heart failure) ([facility])    Chronic bilateral low back pain without sciatica    Closed fracture of proximal end of humerus    Depression    Diverticulitis    Diverticulosis of large intestine    Diverticulosis of large intestine without hemorrhage    Essential hypertension    H/O hysterectomy for benign disease    Heart murmur    Hyperlipidemia    Hypothermia due to anesthesia    Laceration of chest wall    Mitral incompetence    Morbid obesity ([facility])    Non-rheumatic mitral regurgitation    Polyp of sigmoid colon    Postmenopausal status    Urinary incontinence    S/P colectomy    SIRS (systemic inflammatory response syndrome) ([facility])     Acute blood loss anemia  CKD 2  Morbid obesity excess calories  Acute on chronic diastolic CHF  Hypomagnesemia  Hyperglycemia  UTI no hematuria  A. fib controlled rate  Plan:    Labs reviewed continue with diuresis she had CT chest abdomen pelvis with those were reviewed her sed rate CRP are elevated only infection was UTI currently infectious disease on the case has been off antibiotics avoid nephrotoxic drugs started on Coumadin continue with subcu heparin for now      John Leahy MD  7:25 PM

## 2021-03-10 NOTE — PROGRESS NOTES
Recent Surgical History: Lower Abdominal = 2  (S/P Colectomy)      AEROSOL PROTOCOL ASSESSMENT      PEF  (For Asthmatics Only)   N/A    % PREDICTED    N/A      FEV1/FVC    FEV1    N/A       FEV1 % PREDICTED    N/A    FVC   N/A      IS volume   N/A    IBW   N/A    FIO2%   3L    SPO2   96%    RR   16    Breath Sounds:  Diminished with rhonchi      · Bronchodilator assessment at level    3  · Hyperinflation assessment at level   · Secretion Management assessment at level    ·   · [x]    Bronchodilator Assessment  BRONCHODILATOR ASSESSMENT SCORE  Score 0 1 2 3 4 5   Breath Sounds   []  Patient Baseline []  No Wheeze good aeration []  Faint, scattered wheezing, good aeration [x]  Expiratory Wheezing and or moderately diminished []  Insp/Exp wheeze and/or very diminished []  Insp/Exp and/ or marked distress   Respiratory Rate   []  Patient Baseline [x]  Less than 20 [x]  Less than 20 []  20-25 []  Greater than 25 []  Greater than 25   Peak flow % of Pred or PB [x]  NA   []  Greater than 90%  []  81-90% []  71-80% []  Less than or equal to 70%  or unable to perform []  Unable due to Respiratory Distress   Dyspnea re []  Patient Baseline []  No SOB []  No SOB [x]  SOB on exertion []  SOB min activity []  At rest/acute   e FEV% Predicted       [x]  NA []  Above 69%  []  Unable []  Above 60-69%  []  Unable []  Above 50-59%  []  Unable []  Above 35-49%  []  Unable []  Less than 35%  []  Unable                 []  Hyperinflation Assessment  Score 1 2 3   CXR and Breath Sounds   []  Clear []  No atelectasis  Basilar aeration []  Atelectasis or absent basilar breath sounds   Incentive Spirometry Volume  (Per IBW)   []  Greater than or equal to 15ml/Kg []  less than 15ml/Kg []  less than 15ml/Kg   Surgery within last 2 weeks []  None or general   []  Abdominal or thoracic surgery  []  Abdominal or thoracic   Chronic Pulmonary Historyre []  No []  Yes []  Yes     []  Secretion Management Assessment  Score 1 2 3   Bilateral Breath Sounds   []  Occasional Rhonchi []  Scattered Rhonchi []  Course Rhonchi and/or poor aeration   Sputum    []  Small amount of thin secretions []  Moderate amount of viscous secretions []  Copius, Viscious Yellow/ Secretions   CXR as reported by physician []  clear  []  Unavailable []  Infiltrates and/or consolidation  []  Unavailable []  Mucus Plugging and or lobar consolidation  []  Unavailable   Cough []  Strong, productive cough []  Weak productive cough []  No cough or weak non-productive cough

## 2021-03-10 NOTE — PROGRESS NOTES
[facility] 30 SURGERY  PROGRESS NOTE    Patient Name: Tamara Ivory  Patient MRN: [id number]  Date: 3/10/2021; 6:45 AM    CC: feeling better    Subjective:   Patient seen and chart reviewed. No acute events overnight. Feeling better today. Pain controlled. Tolerating diet, no n/v. Continued ostomy output. Afebrile. Objective:    Vitals:    03/10/21 0410   BP: (!) 122/58   Pulse: 83   Resp: 22   Temp: 97.7 °F (36.5 °C)   SpO2: 97%        General: No acute distress. A&Ox3. HEENT:  NC/AT. Conjunctiva moist without icterus. Ears are symmetric. Nares are patent. Oral mucus membranes are moist.  Neck:  Supple. No LAD. Cardiovascular:  s1+s2. Warm, well perfused. Respiratory:  Equal chest rise bilaterally. Abdomen:  Soft, nd, attp. Incision c/d/i w/ packing, Erythema resolved. Ostomy pink and patent with stool in appliance   Neuro: Motor and sensory grossly intact. Extremities:  Warm, dry, and well perfused. Limbs without apparent deformity. Skin:  No rashes or lesions. I/O last 3 completed shifts:   In: 1550 [P.O.:480; I.V.:1070]  Out: 3300 [Urine:3025; Stool:275]    Labs:    CBC:   Lab Results   Component Value Date    WBC 21.8 03/10/2021    RBC 3.10 03/10/2021    HGB 8.9 03/10/2021    HCT 29.5 03/10/2021    MCV 95.2 03/10/2021    MCH 28.7 03/10/2021    MCHC 30.2 03/10/2021    RDW 15.1 03/10/2021     03/10/2021    MPV 10.1 03/10/2021     BMP:    Lab Results   Component Value Date     03/09/2021    K 3.9 03/09/2021    CL 98 03/09/2021    CO2 24 03/09/2021    BUN 22 03/09/2021    LABALBU 2.5 03/06/2021    CREATININE 1.04 03/09/2021    CALCIUM 8.6 03/09/2021    GFRAA >60 03/09/2021    LABGLOM 52 03/09/2021    GLUCOSE 181 03/09/2021     CRP (3/8): 183.2    Pathology (2/25/21):    Source of Specimen   1: SIGMOID AND UPPER RECTUM     SIGMOID COLON AND UPPER RECTUM, SEGMENTAL RESECTION:             -  PERFORATED DIVERTICULITIS WITH SEROSAL ADHESIONS AND   ADJACENT MURAL ABSCESS.        -

## 2021-03-10 NOTE — PROGRESS NOTES
Nutrition Assessment     Type and Reason for Visit: Reassess    Nutrition Recommendations/Plan:   1. Continue  DIET GENERAL; No Added Salt (3-4 GM); Daily Fluid Restriction: 1800 ml  2. Wean off TPN today  3. Start Ensure Enlive 2x/day  4. Monitor diet tolerance, p.o intakes and Ensure Enlive acceptance    Nutrition Assessment:  Patient diet resumed yesterday (3/9) and advanced to General, low sodium; 1800 mL fluid restriction. TPN is weaning off today. RN reports patient ate a little more than 25% at breakfast. Continue current diet an start Ensure Enlive 2x/day. Monitor p.o intakes, diet tolerance and labs. Malnutrition Assessment:  Malnutrition Status: Mild malnutrition    Estimated Daily Nutrient Needs:  Energy (kcal): 3636-3665 (9-11 kcal/kg); Weight Used for Energy Requirements:  Admission     Protein (g): 100-110 (2.0-2.2 g/kg IBW); Weight Used for Protein Requirements:  Ideal            Nutrition Related Findings: +1 pitting BLE edema. Present bowel sounds. S/P exploratory laparotomy, takedown of colovesical fistula secondary to diverticular disease, rectosigmoid colonic resection, end colostomy 2/26      Current Nutrition Therapies:    DIET GENERAL; No Added Salt (3-4 GM);  Daily Fluid Restriction: 1800 ml  PN-Adult 2-in-1 Central Line (Custom)  Dietary Nutrition Supplements:    Anthropometric Measures:  · Height: 5' 2\" (157.5 cm)  · Current Body Wt: 296 lb 1 oz (134.3 kg)   · BMI: 54.1    Nutrition Diagnosis:   · Mild malnutrition, In context of acute illness or injury related to inadequate protein-energy intake as evidenced by poor intake prior to admission, weight loss      Nutrition Interventions:   Food and/or Nutrient Delivery:  Continue Current Diet, Start Oral Nutrition Supplement, Discontinue Parenteral Nutrition  Nutrition Education/Counseling:  Education not indicated   Coordination of Nutrition Care:  Continue to monitor while inpatient    Goals:  PO intakes will be greater than 75% at meals       Nutrition Monitoring and Evaluation:   Behavioral-Environmental Outcomes:  None Identified   Food/Nutrient Intake Outcomes:  Food and Nutrient Intake, Supplement Intake  Physical Signs/Symptoms Outcomes:  Biochemical Data, Fluid Status or Edema, Skin, Weight, GI Status     Discharge Planning:    Continue current diet, Continue Oral Nutrition Supplement         Lucia PLEITEZN, RDN, LDN  Lead Clinical Dietitian  RD Office Phone [phone]

## 2021-03-10 NOTE — PROGRESS NOTES
Occupational Therapy  Facility/Department: [facility] PROGRESSIVE CARE  Daily Treatment Note  NAME: Augusta Moreira  : 1945  MRN: [id number]    Date of Service: 3/10/2021    Discharge Recommendations:  Subacute/Skilled Nursing Facility       Assessment   Performance deficits / Impairments: Decreased functional mobility ; Decreased ADL status; Decreased safe awareness;Decreased endurance;Decreased balance;Decreased strength;Decreased sensation;Decreased high-level IADLs  Assessment: Continue with OT and focus on teach and train of AE use to increase overall I and ease with LB ADL's. Prognosis: Good  REQUIRES OT FOLLOW UP: Yes  Activity Tolerance  Activity Tolerance: Patient Tolerated treatment well;Patient limited by fatigue  Activity Tolerance: P+  Safety Devices  Safety Devices in place: Yes  Type of devices: Left in chair;Nurse notified;Call light within reach         Patient Diagnosis(es): There were no encounter diagnoses.       has a past medical history of Acute respiratory failure following trauma and surgery ([facility] [address] Adiposity, Arthralgia of multiple joints, Arthritis, Asthma, Asthma with acute exacerbation, Atopic rhinitis, CAD (coronary artery disease), CHF (congestive heart failure) ([facility]), Chronic bilateral low back pain without sciatica, Closed fracture of proximal end of humerus, COPD (chronic obstructive pulmonary disease) ([address] Depression, Diverticulitis, Diverticulosis of large intestine, Diverticulosis of large intestine without hemorrhage, Encounter for mammogram to establish baseline mammogram, Essential hypertension, H/O hysterectomy for benign disease, Heart murmur, History of blood transfusion, History of mitral valve repair, Hyperlipidemia, Hypothermia due to anesthesia, Hypothermia due to anesthesia, Laceration of chest wall, Laceration of chest wall, Mitral incompetence, Morbid obesity ([address] Non-rheumatic mitral regurgitation, Polyp of sigmoid colon, Postmenopausal status, Routine general medical examination at a health care facility, Special screening for malignant neoplasms, colon, Special screening for malignant neoplasms, vagina, and Urinary incontinence. has a past surgical history that includes Appendectomy; Mitral valve repair; Hysterectomy; bronchoscopy; Cardiac catheterization (03/22/2017); Cardiac catheterization (11/18*/2016); Wound Exploration; other surgical history; Ovary removal; eye surgery (Bilateral); Colonoscopy; Endoscopy, colon, diagnostic; fracture surgery; and Small intestine surgery (N/A, 2/25/2021). Restrictions  Restrictions/Precautions  Restrictions/Precautions: General Precautions, Surgical Protocols, Fall Risk, Up as Tolerated  Required Braces or Orthoses?: No  Required Braces or Orthoses  Other: Abdominal Binder  Position Activity Restriction  Other position/activity restrictions: Up with assist, abd sx/incision, colostomy, IV, garrido  Subjective   General  Chart Reviewed: Yes  Patient assessed for rehabilitation services?: Yes  Response to previous treatment: Patient with no complaints from previous session  Family / Caregiver Present: No      Orientation  Orientation  Overall Orientation Status: Within Normal Limits  Objective             Balance  Sitting Balance: Independent  Standing Balance: Stand by assistance  Functional Mobility  Functional - Mobility Device: Rolling Walker  Activity: Other  Assist Level: Contact guard assistance(SBA-CGA)  Functional Mobility Comments: Pt completed functional mobility from recliner to door and then needed a seated rest break due to c/o feeling light headed, after rest break returned to recliner for seated rest break. Pt then complete functional mobility from recliner to/from door without rest break using RW with SBA-CGA for safety and total assist with multiple line mgmt.      Transfers  Stand Step Transfers: Stand by assistance  Sit to stand: Stand by assistance  Stand to sit: Stand by assistance  Transfer Comments: MIN cues needed for upright posture, scanning, pursed lip breathing and awareness/assist with all lines to increase overall safety. Pt with good hand placement. Cognition  Overall Cognitive Status: WFL     Perception  Overall Perceptual Status: WFL                                   Plan   Plan  Times per week: 4-5x per week, 1-2x per day as cayetano  Times per day: Daily  Current Treatment Recommendations: Strengthening, Balance Training, Safety Education & Training, Self-Care / ADL, Equipment Evaluation, Education, & procurement, Endurance Training, Patient/Caregiver Education & Training, Home Management Training, Functional Mobility Training, Pain Management             Goals  Short term goals  Time Frame for Short term goals: by discharge, pt to demo  Short term goal 1: S/MI for bed mob tasks with use of bed rail as needed  Short term goal 2: S/MI for UB ADLs and SBA for LB ADLs with AE as needed and Good safety  Short term goal 3: S/MI for ADL transfers and functional mob with least restrictive mob device and Good safety  Short term goal 4: Pt/family to be IND with EC/WS, precautions, and fall prevention tech as well as DME/AE recommendations with use of handouts  Patient Goals   Patient goals : Pt states she wants to go home however is agreeable to rehab if needed.        Therapy Time   Individual Concurrent Group Co-treatment   Time In 1352         Time Out 1416         Minutes Yeny Strickland, CORNELIO

## 2021-03-10 NOTE — PROGRESS NOTES
Pulmonary Critical Care Progress Note  Kori Salgado MD     Patient seen for the follow up of acute respiratory insufficiency, CHF, asthma, JAYDEN    Subjective:  She sitting up in the bedside chair, RN and therapy at bedside. She remains on oxygen at 4 L nasal cannula. Shortness of breath is unchanged. No chest pain. She does have a harsh productive cough but swallows her sputum. She is on general diet however her appetite is poor. Her colostomy is working. Examination:  Vitals: BP (!) 101/41   Pulse 94   Temp 98.2 °F (36.8 °C) (Oral)   Resp 18   Ht 5' 2\" (1.575 m)   Wt 297 lb 8 oz (134.9 kg)   SpO2 100%   BMI 54.41 kg/m²   General appearance: alert and cooperative with exam, up in chair  Neck: No JVD  Lungs: Moderate air exchange, no wheezing  Heart: regular rate and rhythm, S1, S2 normal, no gallop  Abdomen: Soft, non tender, + BS  Extremities: no cyanosis or clubbing. Trace edema    LABs:  CBC:   Recent Labs     03/09/21  0536 03/10/21  0530   WBC 16.6* 21.8*   HGB 10.6* 8.9*   HCT 35.6* 29.5*    324     BMP:   Recent Labs     03/09/21  0536 03/10/21  0530    138   K 3.9 4.0   CO2 24 30   BUN 22 23   CREATININE 1.04* 1.07*   LABGLOM 52* 50*   GLUCOSE 181* 192*      Ref. Range 3/6/2021 14:32   Procalcitonin Latest Ref Range: <0.09 ng/mL 0.18 (H)     Radiology:  X-ray chest:  3/10/2021      KUB:  3/5/2021  Multiple dilated gas-filled small bowel loops with scattered areas of gas in   the colon.  There are more dilated loops appreciated compared to prior.    Differential remains ileus versus obstruction although the progression is   more worrisome for obstruction       Impression:  · Acute on chronic hypoxic respiratory insufficiency, on nocturnal oxygen  · Mild pulmonary edema  · Acute on chronic diastolic HF  · History of asthma  · Obstructive sleep apnea/morbid obesity, on CPAP therapy  · Status post exploratory laparotomy, sigmoid/superior rectum resection and creation of end colostomy and lysis of adhesions with mobilization of splenic flexure 02/25/2021  · SHARNY on CKD  · CAD, HLD, HTN MVR  · Postoperative ileus versus obstruction  · Leukocytosis, worsening, ?  Sepsis     Recommendations:  · Continue airborne isolation until Covid PCR swab has resulted  · Oxygen by nasal cannula, keep SPO2 greater than 90%  · Home Oxygen evaluation prior to discharge   · Albuterol 4 times daily and prn  · Symbicort 160  · Mucomyst 400 mg TID via nebulizer   · Secretion management/hyperinflation protocol per RT  · Demadex and Aldactone per Cardiology  · Amiodarone   · Pain control   · ID following, monitor off of antibiotics   · TPN as per surgery   · Wound care/Ostomy care   · PT/OT  · Labs: CBC and BMP in am  · DVT prophylaxis with subcu heparin  · Incentive spirometry every hour while awake  · Discussed with RN  · Will follow with you    Electronically signed by     Delia Neri MD on 3/10/2021 at 3:04 PM  Pulmonary Critical Care and Sleep Medicine,  [facility]  Cell: [phone]  Office: [phone]

## 2021-03-10 NOTE — PLAN OF CARE
Problem: Pain:  Goal: Pain level will decrease  Description: Pain level will decrease  Outcome: Ongoing  Goal: Control of acute pain  Description: Control of acute pain  Outcome: Ongoing  Goal: Control of chronic pain  Description: Control of chronic pain  Outcome: Ongoing     Problem: Falls - Risk of:  Goal: Will remain free from falls  Description: Will remain free from falls  Outcome: Ongoing  Goal: Absence of physical injury  Description: Absence of physical injury  Outcome: Ongoing     Problem: Skin Integrity:  Goal: Will show no infection signs and symptoms  Description: Will show no infection signs and symptoms  Outcome: Ongoing  Goal: Absence of new skin breakdown  Description: Absence of new skin breakdown  Outcome: Ongoing     Problem: Discharge Planning:  Goal: Discharged to appropriate level of care  Description: Discharged to appropriate level of care  Outcome: Ongoing     Problem: Body Image - Altered:  Goal: Expressions of positive opinion of body image will increase  Description: Expressions of positive opinion of body image will increase  Outcome: Ongoing     Problem:  Bowel Function - Altered:  Goal: Bowel elimination is within specified parameters  Description: Bowel elimination is within specified parameters  Outcome: Ongoing     Problem: Fluid Volume - Imbalance:  Goal: Absence of imbalanced fluid volume signs and symptoms  Description: Absence of imbalanced fluid volume signs and symptoms  Outcome: Ongoing     Problem: Infection - Surgical Site:  Goal: Will show no infection signs and symptoms  Description: Will show no infection signs and symptoms  Outcome: Ongoing  Goal: Demonstration of wound healing without infection will improve  Description: Demonstration of wound healing without infection will improve  Outcome: Ongoing     Problem: Pain:  Goal: Pain level will decrease  Description: Pain level will decrease  Outcome: Ongoing  Goal: Control of acute pain  Description: Control of acute pain  Outcome: Ongoing  Goal: Control of chronic pain  Description: Control of chronic pain  Outcome: Ongoing     Problem: Venous Thromboembolism:  Goal: Will show no signs or symptoms of venous thromboembolism  Description: Will show no signs or symptoms of venous thromboembolism  Outcome: Ongoing  Goal: Absence of signs or symptoms of impaired coagulation  Description: Absence of signs or symptoms of impaired coagulation  Outcome: Ongoing     Problem: Nutrition  Goal: Optimal nutrition therapy  Outcome: Ongoing

## 2021-03-10 NOTE — PLAN OF CARE
Nutrition Problem #1: Mild malnutrition, In context of acute illness or injury  Intervention: Food and/or Nutrient Delivery: Continue Current Diet, Start Oral Nutrition Supplement, Discontinue Parenteral Nutrition  Nutritional Goals: PO intakes will be greater than 75% at meals

## 2021-03-10 NOTE — PROGRESS NOTES
Subjective:     Follow-up CHF  Denies any shortness of breath tachypnea no chest pain no palpitation no dizziness  ROS  Fever no chills no  or GI complaints except nausea slightly vague abdominal discomfort, no TIA no bleeding no headache no sore throat no skin lesions, no polyuria no polydipsia no hypopnea  physical exam  Head normocephalic atraumatic  Eyes mild pallor no jaundice  Neck supple no JVD  Lungs decreased at the bases  Heart regular rate and irregular rhythm  Soft positive bowel sounds  Extremities no edema  Alert oriented x3 no focal deficit  BP (!) 123/39   Pulse 84   Temp 98.2 °F (36.8 °C) (Oral)   Resp 20   Ht 5' 2\" (1.575 m)   Wt 297 lb 8 oz (134.9 kg)   SpO2 100%   BMI 54.41 kg/m²     CBC:   Lab Results   Component Value Date    WBC 21.8 03/10/2021    RBC 3.10 03/10/2021    HGB 8.9 03/10/2021    HCT 29.5 03/10/2021    MCV 95.2 03/10/2021    MCH 28.7 03/10/2021    MCHC 30.2 03/10/2021    RDW 15.1 03/10/2021     03/10/2021    MPV 10.1 03/10/2021     BMP:    Lab Results   Component Value Date     03/10/2021    K 4.0 03/10/2021    CL 98 03/10/2021    CO2 30 03/10/2021    BUN 23 03/10/2021    LABALBU 2.5 03/06/2021    CREATININE 1.07 03/10/2021    CALCIUM 8.2 03/10/2021    GFRAA >60 03/10/2021    LABGLOM 50 03/10/2021    GLUCOSE 192 03/10/2021        Assessment:  Patient Active Problem List   Diagnosis    CHF (congestive heart failure) (HCC)    Essential hypertension    Hyperlipidemia    Morbid obesity ([address]    Urinary incontinence    s/p open rectosigmoidectomy with end colostomy    SIRS (systemic inflammatory response syndrome) (HCC)    Colovesical fistula    Atrial fibrillation (HCC)    Urinary tract infection    Anemia    Acute renal failure (ARF) (HCC)    Recurrent UTI    Acute respiratory failure with hypoxia (HCC)    Acute on chronic congestive heart failure (HCC)   Acute blood loss anemia stable  CKD 2  Morbid obesity excess calories  Acute on chronic diastolic CHF stable  Hypomagnesemia  Hyperglycemia  UTI without hematuria  A. fib with controlled rate  Leukocytosis  Plan:    Labs reviewed continue with the diuretics continue with physical therapy occupational therapy avoid nephrotoxic drugs she is currently on Coumadin therapy, with a leukocytosis cultures were obtained PICC line removed tip sent for culture ID managing      Kathleen Garrido MD  6:46 PM

## 2021-03-10 NOTE — PROGRESS NOTES
[facility]  Progress Note    Augusta Moreira  MRN: [id number]  Date: 3/10/2021  LOS: 15     Reason for F/U :   Colovesicular fistula    Impression :   1. 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 Acute on chronic respiratory insufficiency  3. Acute on chronic diastolic heart failure with mild pulmonary edema  4. Morbid obesity  5. Acute kidney injury on chronic kidney disease  6. E. coli urinary tract infection, status post antimicrobial therapy    Recommendations:   · Patient continues off antimicrobial therapy, we will continue to monitor her off antibiotics for now  · No overt signs of infection despite intermittent openings along the midline abdominal incision  · PICC line was removed and tip was sent for culture  · Continue supportive care    Infection Control Recommendations:   Universal precautions    Discharge Planning:     Patient will need Midline Catheter Insertion/ PICC line Insertion: No  Patient will need: Home IV , [place],  SNF,  LTAC: Undetermined  Patient willneed outpatient wound care: No    Medical Decision making / Summary of Stay:   Augusta Moreira is a 67y.o.-year-old female who was initially admitted on 2/25/2021.    Amita Ward has a history of asthma/COPD obstructive sleep apnea on BiPAP, coronary artery disease, congestive heart failure, morbid obesity hypertension, mitral valve repair 3 years ago, chronic kidney disease stage IIIa, frequent recurrent UTIs for more than 6 months and underwent a cystoscopy was discovered to have a colovesicular fistula related to diverticulitis for which the patient was referred to Dr. Cheli Ga who recommended surgical intervention.     The patient was admitted and underwent flexible sigmoidoscopy, exploratory laparotomy, sigmoid/superior rectum resection with creation of end ileostomy and mobilization of the splenic flexure and lysis of adhesions on 2021.     Postoperatively the patient did develop acute on chronic hypoxic respiratory insufficiency requiring nocturnal oxygen. The patient did have mild paralytic ileus postoperatively.     The patient was subsequently started on ciprofloxacin for a urinary tract infection and this morning the white blood cell count was up to 16.6. The patient was found to have a new cough and she has been on 4 L of oxygen by nasal cannula. There was concern that the patient was becoming septic and antibiotic coverage was broadened this morning to Zosyn and vancomycin.     A CAT scan of the chest, abdomen, pelvis was ordered due to concern for intra-abdominal abscess and there was no evidence of acute pulmonary embolism but was noted to have a postop findings with a small amount of dependent fluid in the pelvis and with the rim enhancement. No loculated fluid collection.     The patient reports feeling some shortness of breath earlier today but now is feeling better. No fevers, chills, sweats, abdominal pain, nausea, vomiting or diarrhea. No dysuria or frequency. Current evaluation:3/10/2021    BP (!) 101/41   Pulse 94   Temp 98.2 °F (36.8 °C) (Oral)   Resp 18   Ht 5' 2\" (1.575 m)   Wt 297 lb 8 oz (134.9 kg)   SpO2 100%   BMI 54.41 kg/m²     Temperature Range: Temp: 98.2 °F (36.8 °C) Temp  Av.1 °F (36.7 °C)  Min: 97.7 °F (36.5 °C)  Max: 98.4 °F (36.9 °C)    Patient is seen and evaluated sitting up in chair from the door  Patient is now in droplet plus precautions due to increased oxygen requirements and patient continuing to require breathing treatments per primary service  No signs of distress  Discussed with RN    Review of Systems   Unable to perform ROS: Other       Physical Examination :     Physical Exam  Constitutional:       Appearance: Normal appearance. She is normal weight. HENT:      Head: Normocephalic and atraumatic.    Pulmonary:      Effort: Pulmonary effort is normal. No respiratory distress. Neurological:      General: No focal deficit present. Mental Status: She is alert. Laboratory data:   I have independently reviewed the followinglabs:  CBC with Differential:   Recent Labs     03/09/21  0536 03/10/21  0530   WBC 16.6* 21.8*   HGB 10.6* 8.9*   HCT 35.6* 29.5*    324   LYMPHOPCT 7* 5*   MONOPCT 8* 6     BMP:   Recent Labs     03/09/21  0536 03/10/21  0530    138   K 3.9 4.0   CL 98 98   CO2 24 30   BUN 22 23   CREATININE 1.04* 1.07*   MG 2.0 2.2     Hepatic Function Panel: No results for input(s): PROT, LABALBU, BILIDIR, IBILI, BILITOT, ALKPHOS, ALT, AST in the last 72 hours. Lab Results   Component Value Date    PROCAL 0.18 03/06/2021     Lab Results   Component Value Date    .2 03/08/2021     Lab Results   Component Value Date    SEDRATE 102 (H) 03/08/2021         No results found for: DDIMER  No results found for: FERRITIN  No results found for: LDH  No results found for: FIBRINOGEN    Results in Past 30 Days  Result Component Current Result Ref Range Previous Result Ref Range   SARS-CoV-2      (2/21/2021)  Not in Time Range     Not Detected (2/21/2021) Not Detected       Lab Results   Component Value Date    COVID19 Not Detected 02/21/2021       No results for input(s): [facility] in the last 72 hours. Imaging Studies:   Chest xray  Impression:        Mild cardiomegaly and chronic pulmonary change with presumed bibasilar   atelectasis. Cultures:     Culture, Blood 1 [[id number]] Collected: 03/06/21 1816   Order Status: Completed Specimen: Blood Updated: 03/10/21 0020    Specimen Description . BLOOD    Special Requests NOT REPORTED    Culture NO GROWTH 4 DAYS     3/8/2021  9:23 AM - RubenTavares Incoming Lab Results From [employer]    Specimen Information: Urine Random        Component Collected Lab   Specimen Description 03/06/2021  5:00 PM [address] [place] [address] Lab   . Random Urine    Special Requests 03/06/2021  5:00  [facility]   NOT REPORTED    Culture Abnormal  03/06/2021  5:00 PM [address] >[id number] CFU/ML    Testing Performed By    Clara Sue Name Director Address Valid Date Range   [address] Fischer MD [address] [facility] [address] 08/30/17 0801-Present   [address] LAB Ольга Abdi MD [address] [facility] [address] 08/30/17 0757-Present   Susceptibility    Escherichia coli (1)    Antibiotic Interpretation RADHA Status    amikacin   Final     NOT REPORTED    ampicillin Resistant  Final     >=32   RESISTANT   ampicillin-sulbactam   Final     NOT REPORTED    aztreonam Sensitive  Final     <=1   SUSCEPTIBLE   ceFAZolin Sensitive  Final     <=4   SUSCEPTIBLE   ceFAZolin Sensitive Cefazolin sensitivity results can be used to predict the effectiveness of oral cephalosporins (eg.  Cephalexin) in uncomplicated Urinary Tract Infections due to E. coli, K. pneumoniae, and P. mirabilis Final    cefepime   Final     NOT REPORTED    cefTRIAXone Sensitive  Final     <=1   SUSCEPTIBLE   ciprofloxacin Sensitive  Final     1   SUSCEPTIBLE   ertapenem   Final     NOT REPORTED    Confirmatory Extended Spectrum Beta-Lactamase Negative NEGATIVE Final    gentamicin Intermediate  Final     8   INTERMEDIATE   meropenem   Final     NOT REPORTED    nitrofurantoin Sensitive  Final     <=16   SUSCEPTIBLE   tigecycline   Final     NOT REPORTED    tobramycin Intermediate  Final     8   INTERMEDIATE   trimethoprim-sulfamethoxazole Sensitive  Final     <=20   SUSCEPTIBLE   piperacillin-tazobactam Sensitive  Final     8   SUSCEPTIBLE   Lab and Collection    Culture, Urine - 3/6/2021  Result History    Culture, Urine on 3/8/2021   Result Information    Flag: AbnormalAbnormal   Status: Final result (Collected: 3/6/2021 17:00)         Medications:      metoprolol tartrate 25 mg Oral BID    acetaminophen  1,000 mg Oral 3 times per day    amiodarone  200 mg Oral Daily    warfarin  5 mg Oral Daily    warfarin (COUMADIN) daily dosing (placeholder)   Other RX Placeholder    albuterol  2.5 mg Nebulization 4x daily    acetylcysteine  400 mg Inhalation TID    insulin lispro  0-6 Units Subcutaneous 4 times per day    fat emulsion  100 mL Intravenous Daily    heparin (porcine)  5,000 Units Subcutaneous 3 times per day    guaiFENesin  600 mg Oral BID    sucralfate  1 g Oral TID AC    pantoprazole  40 mg Intravenous Daily    fluticasone  1 spray Nasal Daily    atorvastatin  20 mg Oral Daily    buPROPion  150 mg Oral Daily    citalopram  10 mg Oral Daily    spironolactone  25 mg Oral Daily    torsemide  20 mg Oral Daily    sodium chloride flush  10 mL Intravenous 2 times per day           [facility]  [address] [place] [address]  Perfect [employer] messaging  OFFICE: [phone]      Electronically signed by [address] [place] [address], APRN - CNP on 3/10/2021 at 12:04 PM  Thank you for allowing us to participate in the care of this patient. Please call with questions. This note iscreated with the assistance of a speech recognition program.  While intending to generate a document that actually reflects the content of the visit, the document can still have some errors including those of syntax andsound a like substitutions which may escape proof reading. In such instances, actual meaning can be extrapolated by contextual diversion.

## 2021-03-11 LAB
ABSOLUTE EOS #: 0.42 K/UL (ref 0–0.44)
ABSOLUTE IMMATURE GRANULOCYTE: 1.04 K/UL (ref 0–0.3)
ABSOLUTE LYMPH #: 1.25 K/UL (ref 1.1–3.7)
ABSOLUTE MONO #: 1.46 K/UL (ref 0.1–1.2)
ANION GAP SERPL CALCULATED.3IONS-SCNC: 10 MMOL/L (ref 9–17)
BASOPHILS # BLD: 1 % (ref 0–2)
BASOPHILS ABSOLUTE: 0.21 K/UL (ref 0–0.2)
BUN BLDV-MCNC: 26 MG/DL (ref 8–23)
BUN/CREAT BLD: 23 (ref 9–20)
CALCIUM SERPL-MCNC: 8.3 MG/DL (ref 8.6–10.4)
CHLORIDE BLD-SCNC: 98 MMOL/L (ref 98–107)
CO2: 27 MMOL/L (ref 20–31)
CREAT SERPL-MCNC: 1.14 MG/DL (ref 0.5–0.9)
DIFFERENTIAL TYPE: ABNORMAL
EOSINOPHILS RELATIVE PERCENT: 2 % (ref 1–4)
GFR AFRICAN AMERICAN: 56 ML/MIN
GFR NON-AFRICAN AMERICAN: 46 ML/MIN
GFR SERPL CREATININE-BSD FRML MDRD: ABNORMAL ML/MIN/{1.73_M2}
GFR SERPL CREATININE-BSD FRML MDRD: ABNORMAL ML/MIN/{1.73_M2}
GLUCOSE BLD-MCNC: 104 MG/DL (ref 65–105)
GLUCOSE BLD-MCNC: 120 MG/DL (ref 65–105)
GLUCOSE BLD-MCNC: 123 MG/DL (ref 65–105)
GLUCOSE BLD-MCNC: 124 MG/DL (ref 70–99)
GLUCOSE BLD-MCNC: 177 MG/DL (ref 65–105)
HCT VFR BLD CALC: 30.1 % (ref 36.3–47.1)
HEMOGLOBIN: 8.7 G/DL (ref 11.9–15.1)
IMMATURE GRANULOCYTES: 5 %
INR BLD: 1.3
LYMPHOCYTES # BLD: 6 % (ref 24–43)
MAGNESIUM: 2 MG/DL (ref 1.6–2.6)
MCH RBC QN AUTO: 28.4 PG (ref 25.2–33.5)
MCHC RBC AUTO-ENTMCNC: 28.9 G/DL (ref 28.4–34.8)
MCV RBC AUTO: 98.4 FL (ref 82.6–102.9)
MONOCYTES # BLD: 7 % (ref 3–12)
MORPHOLOGY: ABNORMAL
NRBC AUTOMATED: 0 PER 100 WBC
PDW BLD-RTO: 15.3 % (ref 11.8–14.4)
PHOSPHORUS: 3.1 MG/DL (ref 2.6–4.5)
PHOSPHORUS: 3.2 MG/DL (ref 2.6–4.5)
PLATELET # BLD: 355 K/UL (ref 138–453)
PLATELET ESTIMATE: ABNORMAL
PMV BLD AUTO: 10.1 FL (ref 8.1–13.5)
POTASSIUM SERPL-SCNC: 4.2 MMOL/L (ref 3.7–5.3)
PROTHROMBIN TIME: 15.6 SEC (ref 11.5–14.2)
PTH INTACT: 56.35 PG/ML (ref 15–65)
RBC # BLD: 3.06 M/UL (ref 3.95–5.11)
RBC # BLD: ABNORMAL 10*6/UL
SEG NEUTROPHILS: 79 % (ref 36–65)
SEGMENTED NEUTROPHILS ABSOLUTE COUNT: 16.42 K/UL (ref 1.5–8.1)
SODIUM BLD-SCNC: 135 MMOL/L (ref 135–144)
VITAMIN D 25-HYDROXY: 36.9 NG/ML (ref 30–100)
WBC # BLD: 20.8 K/UL (ref 3.5–11.3)
WBC # BLD: ABNORMAL 10*3/UL

## 2021-03-11 PROCEDURE — 6370000000 HC RX 637 (ALT 250 FOR IP): Performed by: INTERNAL MEDICINE

## 2021-03-11 PROCEDURE — 6370000000 HC RX 637 (ALT 250 FOR IP): Performed by: STUDENT IN AN ORGANIZED HEALTH CARE EDUCATION/TRAINING PROGRAM

## 2021-03-11 PROCEDURE — 82947 ASSAY GLUCOSE BLOOD QUANT: CPT

## 2021-03-11 PROCEDURE — 2700000000 HC OXYGEN THERAPY PER DAY

## 2021-03-11 PROCEDURE — 85610 PROTHROMBIN TIME: CPT

## 2021-03-11 PROCEDURE — 99232 SBSQ HOSP IP/OBS MODERATE 35: CPT | Performed by: INTERNAL MEDICINE

## 2021-03-11 PROCEDURE — 6370000000 HC RX 637 (ALT 250 FOR IP): Performed by: COLON & RECTAL SURGERY

## 2021-03-11 PROCEDURE — 6360000002 HC RX W HCPCS: Performed by: NURSE PRACTITIONER

## 2021-03-11 PROCEDURE — 97530 THERAPEUTIC ACTIVITIES: CPT

## 2021-03-11 PROCEDURE — 82306 VITAMIN D 25 HYDROXY: CPT

## 2021-03-11 PROCEDURE — 36415 COLL VENOUS BLD VENIPUNCTURE: CPT

## 2021-03-11 PROCEDURE — 83735 ASSAY OF MAGNESIUM: CPT

## 2021-03-11 PROCEDURE — 97535 SELF CARE MNGMENT TRAINING: CPT

## 2021-03-11 PROCEDURE — 84155 ASSAY OF PROTEIN SERUM: CPT

## 2021-03-11 PROCEDURE — 6360000002 HC RX W HCPCS: Performed by: INTERNAL MEDICINE

## 2021-03-11 PROCEDURE — 84100 ASSAY OF PHOSPHORUS: CPT

## 2021-03-11 PROCEDURE — C9113 INJ PANTOPRAZOLE SODIUM, VIA: HCPCS | Performed by: INTERNAL MEDICINE

## 2021-03-11 PROCEDURE — 2580000003 HC RX 258: Performed by: STUDENT IN AN ORGANIZED HEALTH CARE EDUCATION/TRAINING PROGRAM

## 2021-03-11 PROCEDURE — 6370000000 HC RX 637 (ALT 250 FOR IP): Performed by: NURSE PRACTITIONER

## 2021-03-11 PROCEDURE — 6360000002 HC RX W HCPCS: Performed by: STUDENT IN AN ORGANIZED HEALTH CARE EDUCATION/TRAINING PROGRAM

## 2021-03-11 PROCEDURE — 85025 COMPLETE CBC W/AUTO DIFF WBC: CPT

## 2021-03-11 PROCEDURE — 80048 BASIC METABOLIC PNL TOTAL CA: CPT

## 2021-03-11 PROCEDURE — 2060000000 HC ICU INTERMEDIATE R&B

## 2021-03-11 PROCEDURE — 94640 AIRWAY INHALATION TREATMENT: CPT

## 2021-03-11 PROCEDURE — 99212 OFFICE O/P EST SF 10 MIN: CPT

## 2021-03-11 PROCEDURE — 86038 ANTINUCLEAR ANTIBODIES: CPT

## 2021-03-11 PROCEDURE — 84165 PROTEIN E-PHORESIS SERUM: CPT

## 2021-03-11 PROCEDURE — 94761 N-INVAS EAR/PLS OXIMETRY MLT: CPT

## 2021-03-11 PROCEDURE — 83970 ASSAY OF PARATHORMONE: CPT

## 2021-03-11 RX ADMIN — HEPARIN SODIUM 5000 UNITS: 5000 INJECTION INTRAVENOUS; SUBCUTANEOUS at 21:54

## 2021-03-11 RX ADMIN — HEPARIN SODIUM 5000 UNITS: 5000 INJECTION INTRAVENOUS; SUBCUTANEOUS at 06:27

## 2021-03-11 RX ADMIN — SODIUM CHLORIDE, PRESERVATIVE FREE 10 ML: 5 INJECTION INTRAVENOUS at 09:30

## 2021-03-11 RX ADMIN — TORSEMIDE 20 MG: 20 TABLET ORAL at 09:29

## 2021-03-11 RX ADMIN — ACETAMINOPHEN 1000 MG: 500 TABLET ORAL at 13:22

## 2021-03-11 RX ADMIN — HEPARIN SODIUM 5000 UNITS: 5000 INJECTION INTRAVENOUS; SUBCUTANEOUS at 13:22

## 2021-03-11 RX ADMIN — SPIRONOLACTONE 25 MG: 25 TABLET ORAL at 09:30

## 2021-03-11 RX ADMIN — ALBUTEROL SULFATE 2.5 MG: 2.5 SOLUTION RESPIRATORY (INHALATION) at 19:29

## 2021-03-11 RX ADMIN — METOPROLOL TARTRATE 25 MG: 25 TABLET, FILM COATED ORAL at 09:29

## 2021-03-11 RX ADMIN — INSULIN LISPRO 1 UNITS: 100 INJECTION, SOLUTION INTRAVENOUS; SUBCUTANEOUS at 13:22

## 2021-03-11 RX ADMIN — PANTOPRAZOLE SODIUM 40 MG: 40 INJECTION, POWDER, FOR SOLUTION INTRAVENOUS at 09:30

## 2021-03-11 RX ADMIN — METOPROLOL TARTRATE 25 MG: 25 TABLET, FILM COATED ORAL at 21:54

## 2021-03-11 RX ADMIN — ACETYLCYSTEINE 400 MG: 200 SOLUTION ORAL; RESPIRATORY (INHALATION) at 19:28

## 2021-03-11 RX ADMIN — FLUTICASONE PROPIONATE 1 SPRAY: 50 SPRAY, METERED NASAL at 09:25

## 2021-03-11 RX ADMIN — ALBUTEROL SULFATE 2.5 MG: 2.5 SOLUTION RESPIRATORY (INHALATION) at 15:31

## 2021-03-11 RX ADMIN — SUCRALFATE 1 G: 1 TABLET ORAL at 16:54

## 2021-03-11 RX ADMIN — ACETAMINOPHEN 1000 MG: 500 TABLET ORAL at 21:54

## 2021-03-11 RX ADMIN — SODIUM CHLORIDE, PRESERVATIVE FREE 10 ML: 5 INJECTION INTRAVENOUS at 21:55

## 2021-03-11 RX ADMIN — ATORVASTATIN CALCIUM 20 MG: 20 TABLET, FILM COATED ORAL at 09:30

## 2021-03-11 RX ADMIN — ALBUTEROL SULFATE 2.5 MG: 2.5 SOLUTION RESPIRATORY (INHALATION) at 07:25

## 2021-03-11 RX ADMIN — GUAIFENESIN 600 MG: 600 TABLET, EXTENDED RELEASE ORAL at 21:54

## 2021-03-11 RX ADMIN — ALBUTEROL SULFATE 2.5 MG: 2.5 SOLUTION RESPIRATORY (INHALATION) at 11:26

## 2021-03-11 RX ADMIN — BUPROPION HYDROCHLORIDE 150 MG: 150 TABLET, EXTENDED RELEASE ORAL at 09:30

## 2021-03-11 RX ADMIN — CITALOPRAM HYDROBROMIDE 10 MG: 10 TABLET ORAL at 09:30

## 2021-03-11 RX ADMIN — AMIODARONE HYDROCHLORIDE 200 MG: 200 TABLET ORAL at 09:30

## 2021-03-11 RX ADMIN — GUAIFENESIN 600 MG: 600 TABLET, EXTENDED RELEASE ORAL at 09:30

## 2021-03-11 RX ADMIN — ACETYLCYSTEINE 400 MG: 200 SOLUTION ORAL; RESPIRATORY (INHALATION) at 07:25

## 2021-03-11 RX ADMIN — ACETAMINOPHEN 1000 MG: 500 TABLET ORAL at 06:27

## 2021-03-11 RX ADMIN — SUCRALFATE 1 G: 1 TABLET ORAL at 13:22

## 2021-03-11 RX ADMIN — WARFARIN SODIUM 5 MG: 5 TABLET ORAL at 16:54

## 2021-03-11 RX ADMIN — SUCRALFATE 1 G: 1 TABLET ORAL at 06:28

## 2021-03-11 ASSESSMENT — PAIN SCALES - GENERAL
PAINLEVEL_OUTOF10: 0
PAINLEVEL_OUTOF10: 0
PAINLEVEL_OUTOF10: 3
PAINLEVEL_OUTOF10: 3
PAINLEVEL_OUTOF10: 6
PAINLEVEL_OUTOF10: 3
PAINLEVEL_OUTOF10: 0
PAINLEVEL_OUTOF10: 0
PAINLEVEL_OUTOF10: 3

## 2021-03-11 ASSESSMENT — PAIN DESCRIPTION - ORIENTATION: ORIENTATION: MID

## 2021-03-11 ASSESSMENT — ENCOUNTER SYMPTOMS
RESPIRATORY NEGATIVE: 1
GASTROINTESTINAL NEGATIVE: 1

## 2021-03-11 ASSESSMENT — PAIN DESCRIPTION - LOCATION: LOCATION: ABDOMEN

## 2021-03-11 ASSESSMENT — PAIN DESCRIPTION - PAIN TYPE: TYPE: SURGICAL PAIN

## 2021-03-11 NOTE — PROGRESS NOTES
DATE: 3/11/2021    NAME: Karen Canada  MRN: [id number]   : 1945    Patient not seen this date for Physical Therapy due to:  [] Blood transfusion in progress  [x] Cancel by RN (Rn requests writer come back after lunch)  [] Hemodialysis  []  Refusal by Patient   [] Spine Precautions   [] Strict Bedrest  [] Surgery  [] Testing      [] Other        [] PT being discontinued at this time. Patient independent. No further needs. [] PT being discontinued at this time as the patient has been transferred to hospice care. No further needs.     RAUL NOONAN, PTA

## 2021-03-11 NOTE — PROGRESS NOTES
Subjective:     Follow-up CHF  With occasional cough mild shortness of breath with exertion no PND no orthopnea  ROS  Fever no chills no  or cardiac complaints, still with vague abdominal pain nausea tolerating diet colostomy working, no TIA no bleeding no headache no sore throat no skin lesions no polyuria no polydipsia mild dizziness  physical exam  General Appearance: No pallor in no acute distress and alert  Skin: warm and dry, no rash or erythema  Head: normocephalic and atraumatic  Eyes: pupils equal, round, and reactive to light, sclera anicteric and mild pallor  Neck: neck supple and non tender without mass   Pulmonary/Chest: Air entry equal decreased at the bases no rhonchi no use of intercostal muscles  Cardiovascular: normal rate, regular rhythm, normal S1 and S2, no gallops, intact distal pulses and no carotid bruits  Abdomen: soft, non-tender, non-distended and bowel sounds colostomy  Extremities: no edema and pulses no Homans' sign  Neurologic: Alert oriented x3 with no focal deficit    BP (!) 113/48   Pulse 92   Temp 98.2 °F (36.8 °C) (Oral)   Resp 20   Ht 5' 2\" (1.575 m)   Wt 291 lb 9.6 oz (132.3 kg)   SpO2 95%   BMI 53.33 kg/m²     CBC:   Lab Results   Component Value Date    WBC 20.8 03/11/2021    RBC 3.06 03/11/2021    HGB 8.7 03/11/2021    HCT 30.1 03/11/2021    MCV 98.4 03/11/2021    MCH 28.4 03/11/2021    MCHC 28.9 03/11/2021    RDW 15.3 03/11/2021     03/11/2021    MPV 10.1 03/11/2021     BMP:    Lab Results   Component Value Date     03/11/2021    K 4.2 03/11/2021    CL 98 03/11/2021    CO2 27 03/11/2021    BUN 26 03/11/2021    LABALBU 2.5 03/06/2021    CREATININE 1.14 03/11/2021    CALCIUM 8.3 03/11/2021    GFRAA 56 03/11/2021    LABGLOM 46 03/11/2021    GLUCOSE 124 03/11/2021        Assessment:  Patient Active Problem List   Diagnosis    CHF (congestive heart failure) (HCC)    Essential hypertension    Hyperlipidemia    Morbid obesity (HCC)    Urinary incontinence  s/p open rectosigmoidectomy with end colostomy    SIRS (systemic inflammatory response syndrome) (HCC)    Colovesical fistula    Atrial fibrillation (HCC)    Urinary tract infection    Anemia    Acute renal failure (ARF) (HCC)    Recurrent UTI    Acute respiratory failure with hypoxia (HCC)    Acute on chronic congestive heart failure (HCC)     Status post colovesical fistula repair  Acute acute on chronic diastolic CHF compensated continue with no added salt 1800 cc fluid restriction with continue with Demadex  Extreme morbid obesity excess calories BMI greater than 50  CKD 3 A avoid nephrotoxic drugs  Paroxysmal atrial fibrillation back in sinus currently on Coumadin  Pulmonary atelectasis incentive spirometry acute on chronic respiratory failure managed by pulmonary  Leukocytosis so far cultures have been negative off antibiotics ID seeing patient  Plan:    Meds labs reviewed avoid nephrotoxic drugs continue with Coumadin and subcu heparin for DVT prophylaxis:  We will check orthostatic blood pressure improved occasional dizziness see orders      Jose Torres MD  11:48 AM

## 2021-03-11 NOTE — PLAN OF CARE
Problem: Pain:  Goal: Pain level will decrease  Description: Pain level will decrease  Outcome: Ongoing  Goal: Control of acute pain  Description: Control of acute pain  Outcome: Ongoing     Problem: Falls - Risk of:  Goal: Will remain free from falls  Description: Will remain free from falls  Outcome: Ongoing  Goal: Absence of physical injury  Description: Absence of physical injury  Outcome: Ongoing     Problem: Pain:  Goal: Pain level will decrease  Description: Pain level will decrease  Outcome: Ongoing

## 2021-03-11 NOTE — CARE COORDINATION
Updated clinicals faxed to [facility] [place]. LSW called [place] to follow up on pre-cert. Pre-cert still pending.

## 2021-03-11 NOTE — PROGRESS NOTES
[facility]  Progress Note    Christina Mathew  MRN: [id number]  Date: 3/11/2021  LOS: 15     Reason for F/U :   Systemic inflammatory response syndrome, leukocytosis    Impression :   1. 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 Acute on chronic respiratory insufficiency  3. Acute on chronic diastolic heart failure with mild pulmonary edema  4. Morbid obesity  5. Acute kidney injury on chronic kidney disease  6. E. coli UTI status post antimicrobial therapy  7. Leukocytosis  8. Coagulase-negative staph in 1 out of 2 sets of blood cultures represents a contaminant    Recommendations:   · The patient remains off antimicrobial therapy. · The coagulase-negative staph in 1 set of blood culture is a contaminant. · The patient continues to have stable leukocytosis without any overt signs of infection. · Continue supportive therapy off antimicrobial therapy    Infection Control Recommendations:   Universal precautions    Discharge Planning:   Patient will need Midline Catheter Insertion/ PICC line Insertion: No  Patient will need: Home IV , [place],  SNF,  LTAC: Undetermined  Patient willneed outpatient wound care: Yes    Medical Decision making / Summary of Stay:   Christina Mathew is a 67y.o.-year-old female who was initially admitted on 2/25/2021.    Ramón Johnson has a history of asthma/COPD obstructive sleep apnea on BiPAP, coronary artery disease, congestive heart failure, morbid obesity hypertension, mitral valve repair 3 years ago, chronic kidney disease stage IIIa, frequent recurrent UTIs for more than 6 months and underwent a cystoscopy was discovered to have a colovesicular fistula related to diverticulitis for which the patient was referred to Dr. Livia Weinberg who recommended surgical intervention.     The patient was admitted and underwent flexible sigmoidoscopy, exploratory laparotomy, sigmoid/superior rectum resection with creation of end ileostomy and mobilization of the splenic flexure and lysis of adhesions on 2021.     Postoperatively the patient did develop acute on chronic hypoxic respiratory insufficiency requiring nocturnal oxygen. The patient did have mild paralytic ileus postoperatively.     The patient was subsequently started on ciprofloxacin for a urinary tract infection and this morning the white blood cell count was up to 16.6. The patient was found to have a new cough and she has been on 4 L of oxygen by nasal cannula. There was concern that the patient was becoming septic and antibiotic coverage was broadened this morning to Zosyn and vancomycin.     A CAT scan of the chest, abdomen, pelvis was ordered due to concern for intra-abdominal abscess and there was no evidence of acute pulmonary embolism but was noted to have a postop findings with a small amount of dependent fluid in the pelvis and with the rim enhancement. No loculated fluid collection.     The patient reports feeling some shortness of breath earlier today but now is feeling better. No fevers, chills, sweats, abdominal pain, nausea, vomiting or diarrhea. No dysuria or frequency. Current evaluation:3/11/2021    BP (!) 103/40   Pulse 85   Temp 98.4 °F (36.9 °C) (Oral)   Resp 20   Ht 5' 2\" (1.575 m)   Wt 291 lb 9.6 oz (132.3 kg)   SpO2 96%   BMI 53.33 kg/m²     Temperature Range: Temp: 98.4 °F (36.9 °C) Temp  Av.1 °F (36.7 °C)  Min: 97.9 °F (36.6 °C)  Max: 98.4 °F (36.9 °C)  The patient is seen and evaluated at bedside and she is awake and alert in no acute distress. She reports that she is feeling much better and is eating her dinner right now and tolerating it well. No subjective fevers or chills, no cough or shortness of breath, no abdominal pain. The Ornelas catheter was removed and she is urinating okay.   She has been able to ambulate in the room without any significant difficulty. Review of Systems   Constitutional: Negative. Respiratory: Negative. Cardiovascular: Negative. Gastrointestinal: Negative. Genitourinary: Negative. Musculoskeletal: Negative. Skin: Negative. Neurological: Negative. Psychiatric/Behavioral: Negative. Physical Examination :     Physical Exam  Constitutional:       Appearance: She is well-developed. She is obese. HENT:      Head: Normocephalic and atraumatic. Neck:      Musculoskeletal: Normal range of motion and neck supple. Cardiovascular:      Rate and Rhythm: Regular rhythm. Heart sounds: Normal heart sounds. Pulmonary:      Effort: Pulmonary effort is normal.      Breath sounds: Normal breath sounds. Abdominal:      General: Bowel sounds are normal.      Palpations: Abdomen is soft. Comments: Midline incision with packing intermittently   Skin:     General: Skin is warm and dry. Neurological:      Mental Status: She is alert and oriented to person, place, and time.          Laboratory data:   I have independently reviewed the followinglabs:  CBC with Differential:   Recent Labs     03/10/21  0530 03/11/21  0544   WBC 21.8* 20.8*   HGB 8.9* 8.7*   HCT 29.5* 30.1*    355   LYMPHOPCT 5* 6*   MONOPCT 6 7     BMP:   Recent Labs     03/10/21  0530 03/11/21  0544    135   K 4.0 4.2   CL 98 98   CO2 30 27   BUN 23 26*   CREATININE 1.07* 1.14*   MG 2.2 2.0     Hepatic Function Panel:   Recent Labs     03/11/21  1224   PROT 6.5         Lab Results   Component Value Date    PROCAL 0.18 03/06/2021     Lab Results   Component Value Date    .2 03/08/2021     Lab Results   Component Value Date    SEDRATE 102 (H) 03/08/2021         No results found for: DDIMER  No results found for: FERRITIN  No results found for: LDH  No results found for: FIBRINOGEN    Results in Past 30 Days  Result Component Current Result Ref Range Previous Result Ref Range   SARS-CoV-2 Not Detected (3/10/2021) Not Detected (2/21/2021)          (3/10/2021)  Not Detected (2/21/2021) Not Detected     Lab Results   Component Value Date    COVID19 Not Detected 03/10/2021    COVID19 Not Detected 02/21/2021       No results for input(s): VANCOTROUGH in the last 72 hours. Imaging Studies:   CT CYSTOGRAM 3/10/2021 5:08 pm  Impression   No evidence of urinary bladder injury or perforation.       Given that the installation of contrast was via Ornelas catheter situated in   the urinary bladder, the presence of urethral fistula cannot be assessed in   this exam since the Ornelas catheter bypasses the urethra. ONE XRAY VIEW OF THE CHEST 3/10/2021 8:13 am  Impression   Mild cardiomegaly and chronic pulmonary change with presumed bibasilar   atelectasis. Cultures:     Culture, Blood 1 [[id number]] Collected: 03/10/21 0740   Order Status: Completed Specimen: Blood Updated: 03/11/21 1420    Specimen Description . BLOOD    Special Requests RH    Culture NO GROWTH 1 DAY   Culture, Blood 1 [[id number]] (Abnormal) Collected: 03/10/21 0750   Order Status: Completed Specimen: Blood Updated: 03/11/21 1301    Specimen Description . BLOOD    Special Requests LW    Culture POSITIVE Blood Culture Results called to and read back by: Shady Cortes RN AT 1258 ON 3.11. 21Abnormal      DIRECT GRAM STAIN FROM BOTTLE: GRAM POSITIVE COCCI IN PAIRS     Staphylococcus epidermidis Detected:   mecA/C Gene Detected- Methicillin Resistant Organism   Methodology- Polymerase Chain Reaction (PCR)   Abnormal    Culture, Aerobic [[id number]] Collected: 03/10/21 1553   Order Status: Completed Specimen: Catheter Tip Updated: 03/11/21 1127    Specimen Description . CATHETER TIP    Special Requests NOT REPORTED    Direct Exam NOT REPORTED    Culture CULTURE IN PROGRESS   Culture, Blood 1 [[id number]] Collected: 03/06/21 1816   Order Status: Completed Specimen: Blood Updated: 03/11/21 0747    Specimen Description . BLOOD    Special Requests NOT REPORTED    Culture NO GROWTH 5 DAYS     Culture, Urine [[id number]] (Abnormal)  Collected: 03/06/21 1700   Order Status: Completed Specimen: Urine Random Updated: 03/08/21 0951    Specimen Description . Random Urine    Special Requests NOT REPORTED    Culture ESCHERICHIA COLI >[id number] CFU/MLAbnormal    Escherichia coli (1)    Antibiotic Interpretation RADHA Status    amikacin   Final     NOT REPORTED    ampicillin Resistant  Final     >=32   RESISTANT   ampicillin-sulbactam   Final     NOT REPORTED    aztreonam Sensitive  Final     <=1   SUSCEPTIBLE   ceFAZolin Sensitive  Final     <=4   SUSCEPTIBLE   ceFAZolin Sensitive Cefazolin sensitivity results can be used to predict the effectiveness of oral cephalosporins (eg.  Cephalexin) in uncomplicated Urinary Tract Infections due to E. coli, K. pneumoniae, and P. mirabilis Final    cefepime   Final     NOT REPORTED    cefTRIAXone Sensitive  Final     <=1   SUSCEPTIBLE   ciprofloxacin Sensitive  Final     1   SUSCEPTIBLE   ertapenem   Final     NOT REPORTED    Confirmatory Extended Spectrum Beta-Lactamase Negative NEGATIVE Final    gentamicin Intermediate  Final     8   INTERMEDIATE   meropenem   Final     NOT REPORTED    nitrofurantoin Sensitive  Final     <=16   SUSCEPTIBLE   tigecycline   Final     NOT REPORTED    tobramycin Intermediate  Final     8   INTERMEDIATE   trimethoprim-sulfamethoxazole Sensitive  Final     <=20   SUSCEPTIBLE   piperacillin-tazobactam Sensitive  Final     8   SUSCEPTIBLE             Medications:      metoprolol tartrate  25 mg Oral BID    acetaminophen  1,000 mg Oral 3 times per day    amiodarone  200 mg Oral Daily    warfarin  5 mg Oral Daily    warfarin (COUMADIN) daily dosing (placeholder)   Other RX Placeholder    albuterol  2.5 mg Nebulization 4x daily    acetylcysteine  400 mg Inhalation TID    insulin lispro  0-6 Units Subcutaneous 4 times per day    heparin (porcine)  5,000 Units Subcutaneous 3 times per day    guaiFENesin  600 mg Oral BID    sucralfate  1 g Oral TID AC    pantoprazole  40 mg Intravenous Daily    fluticasone  1 spray Nasal Daily    atorvastatin  20 mg Oral Daily    buPROPion  150 mg Oral Daily    citalopram  10 mg Oral Daily    spironolactone  25 mg Oral Daily    torsemide  20 mg Oral Daily    sodium chloride flush  10 mL Intravenous 2 times per day           [facility]  [address]  [employer]  OFFICE: [phone]      Electronically signed by ErickSumma Health W[facility] Medical CenterTh Street, [place] on 3/11/2021 at 5:20 PM  Thank you for allowing us to participate in the care of this patient. Please call with questions. This note iscreated with the assistance of a speech recognition program.  While intending to generate a document that actually reflects the content of the visit, the document can still have some errors including those of syntax andsound a like substitutions which may escape proof reading. In such instances, actual meaning can be extrapolated by contextual diversion.

## 2021-03-11 NOTE — PROGRESS NOTES
[facility]    Patient Name:  Josh Garcia    RADHA:[birth year]       SUBJECTIVE:  Patient sitting up in the chair. She denies any chest pain or dyspnea. VS: BP (!) 113/48   Pulse 92   Temp 98.2 °F (36.8 °C) (Oral)   Resp 20   Ht 5' 2\" (1.575 m)   Wt 291 lb 9.6 oz (132.3 kg)   SpO2 95%   BMI 53.33 kg/m²   Wt: Weight change: -5 lb 14.4 oz (-2.676 kg)  I/O: I/O last 3 completed shifts:  In: -   Out: 1950 [Urine:1825; Stool:125]  No intake/output data recorded. Monitor: SR with intermittent atrial fibrillation overnight. Meds:  Scheduled Meds:   metoprolol tartrate  25 mg Oral BID    acetaminophen  1,000 mg Oral 3 times per day    amiodarone  200 mg Oral Daily    warfarin  5 mg Oral Daily    warfarin (COUMADIN) daily dosing (placeholder)   Other RX Placeholder    albuterol  2.5 mg Nebulization 4x daily    acetylcysteine  400 mg Inhalation TID    insulin lispro  0-6 Units Subcutaneous 4 times per day    heparin (porcine)  5,000 Units Subcutaneous 3 times per day    guaiFENesin  600 mg Oral BID    sucralfate  1 g Oral TID AC    pantoprazole  40 mg Intravenous Daily    fluticasone  1 spray Nasal Daily    atorvastatin  20 mg Oral Daily    buPROPion  150 mg Oral Daily    citalopram  10 mg Oral Daily    spironolactone  25 mg Oral Daily    torsemide  20 mg Oral Daily    sodium chloride flush  10 mL Intravenous 2 times per day      Continuous Infusions:   dextrose      dextrose 5% and 0.45% NaCl with KCl 20 mEq 10 mL/hr at 03/06/21 1011     PRN Meds: oxyCODONE, sodium chloride flush, phenol, glucose, dextrose, glucagon (rDNA), dextrose, calcium carbonate, sodium chloride flush, ondansetron     Physical Exam:    General Appearance: A&Ox3, NAD. Skin: Pink, warm and dry. Pulmonary/Chest: Coarse throughout. Cardiovascular: S1S2, RRR, negative JVD. Extremities: Mild peripheral edema.     Labs:    CBC with Differential:    Lab Results   Component Value Date    WBC 20.8 03/11/2021 RBC 3.06 03/11/2021    HGB 8.7 03/11/2021    HCT 30.1 03/11/2021     03/11/2021    MCV 98.4 03/11/2021    MCH 28.4 03/11/2021    MCHC 28.9 03/11/2021    RDW 15.3 03/11/2021   [  BMP:     Lab Results   Component Value Date     03/11/2021    K 4.2 03/11/2021    CL 98 03/11/2021    CO2 27 03/11/2021    BUN 26 03/11/2021    CREATININE 1.14 03/11/2021     MG/PHOS:    Lab Results   Component Value Date    MG 2.0 03/11/2021    PHOS 3.2 03/11/2021     PT/INR:    Lab Results   Component Value Date    INR 1.3 03/11/2021     Cystogram:  Impression   No evidence of urinary bladder injury or perforation.       Given that the installation of contrast was via Ornelas catheter situated in   the urinary bladder, the presence of urethral fistula cannot be assessed in   this exam since the Ornelas catheter bypasses the urethra. Assessment/Plan:    Principal Problem:    s/p open rectosigmoidectomy with end colostomy  Active Problems:    CHF (congestive heart failure) (HCC)    Essential hypertension    Hyperlipidemia    Morbid obesity (HCC)    Urinary incontinence    SIRS (systemic inflammatory response syndrome) (HCC)    Colovesical fistula    Atrial fibrillation (HCC)    Urinary tract infection    Anemia    Acute renal failure (ARF) (HCC)    Recurrent UTI    Acute respiratory failure with hypoxia (HCC)    Acute on chronic congestive heart failure ([facility] [address]  Resolved Problems:    * No resolved hospital problems. *     1. Atrial fibrillation  -Currently in SR. On Amiodarone and Lopressor. AC with Coumadin. INR 1.3 today. Continue current dose of Coumadin.     2. CHF  -Stable. Weight down. In negative balance. On Demadex.     3. HTN  -Blood pressure well controlled.     4. HC  -On Statin.     5. Leukocytosis  -ID following. Blood cultures pending. PICC line tip cultures pending.     6. Anemia  -Hemoglobin stable.     Electronically signed by ALEX Rea - CNP on 3/11/2021 at 11:04 AM

## 2021-03-11 NOTE — PROGRESS NOTES
[place] [id number] SURGERY  PROGRESS NOTE    Patient Name: Iqra Swanson  Patient MRN: [id number]  Date: 3/11/2021; 4:51 AM    CC: feeling better    Subjective:   Patient seen and chart reviewed. No acute events overnight. Admits to subjective fever, chills, no document fevers in chart Tm 36.9c. Tolerating diet, slight nausea no emesis. Continued ostomy output. Pain controlled. Was up oob, to chair and ambulate in room. Ct cystogram reviewed, no extravasation. No appreciable fluid collection in pelvis. Objective:    Vitals:    03/11/21 0412   BP: (!) 122/52   Pulse: 90   Resp: 16   Temp: 97.9 °F (36.6 °C)   SpO2: 96%        General: No acute distress. A&Ox3. HEENT:  NC/AT. Conjunctiva moist without icterus. Ears are symmetric. Nares are patent. Oral mucus membranes are moist.  Neck:  Supple. No LAD. Cardiovascular:  s1+s2. Warm, well perfused. Respiratory:  Equal chest rise bilaterally. Abdomen:  Soft, nd, attp. Incision c/d/i w/ packing, Ostomy pink and patent, stool in appliance  Neuro: Motor and sensory grossly intact. Extremities:  Warm, dry, and well perfused. Limbs without apparent deformity. Skin:  No rashes or lesions.     I/O last 3 completed shifts:  In: -   Out: 2025 [Urine:1975; Stool:50]    Labs:    CBC:   Lab Results   Component Value Date    WBC 21.8 03/10/2021    RBC 3.10 03/10/2021    HGB 8.9 03/10/2021    HCT 29.5 03/10/2021    MCV 95.2 03/10/2021    MCH 28.7 03/10/2021    MCHC 30.2 03/10/2021    RDW 15.1 03/10/2021     03/10/2021    MPV 10.1 03/10/2021     BMP:    Lab Results   Component Value Date     03/10/2021    K 4.0 03/10/2021    CL 98 03/10/2021    CO2 30 03/10/2021    BUN 23 03/10/2021    LABALBU 2.5 03/06/2021    CREATININE 1.07 03/10/2021    CALCIUM 8.2 03/10/2021    GFRAA >60 03/10/2021    LABGLOM 50 03/10/2021    GLUCOSE 192 03/10/2021     CRP (3/8): 183.2    Pathology (2/25/21):    Source of Specimen   1: SIGMOID AND UPPER RECTUM     SIGMOID COLON AND UPPER RECTUM, SEGMENTAL RESECTION:             -  PERFORATED DIVERTICULITIS WITH SEROSAL ADHESIONS AND   ADJACENT MURAL ABSCESS.        -  MARGINS APPEAR VIABLE.        -  BENIGN REACTIVE MESENTERIC LYMPH NODES. Gross Description   \"NOE LUIS ANTONIO, SIGMOID AND UPPER RECTUM\" 15.0 cm long x 4.0 cm in circumference segment of colon with a large amount of mesenteric fat. The serosa is pink-tan with adhesions and in an area of dense adhesions, there is a 0.2 cm transmural defect that is 4.0 cm from the closest margin.  The mucosa is pink-tan with diverticula up to 1.5 cm and in the center of the segment, away from the aforementioned defect, there is a region of cavitation within the wall and fat consistent with abscess. [employer] are no masses.  The wall ranges in thickness from 0.2 to 0.8 cm with no marked stricture.  Within the fat there are a few rubbery nodes up to 0.4 cm.  Cassette summary:  \"A\" resection margins shave, \"B-C\" transmural defect, \"D\" separate abscess and representative nodes. Radiology:  Xr Chest 1 View  Result Date: 3/7/2021  Interval placement of right sided PICC line with tip within the mid SVC. The remainder of the findings are stable. Assessment/Plan:     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 - cont general diet  - f/u blood cx and picc tip culture  - leukocytosis - no known source, cont to follow up cultures and monitor off abx. Appreciate ID rec. - saline lock IV  - appreciate IM, ID input. - dvt: hep TID  - wound care: continue packing BID. Ok to shower    Amada Melara saw and examined the patient. I have edited the above and agree with the above. MAXIME Ricks  Colorectal Surgery

## 2021-03-11 NOTE — PROGRESS NOTES
Pulmonary Critical Care Progress Note  Ysabel Gutierrez MD     Patient seen for the follow up of acute respiratory insufficiency, CHF, asthma, JAYDEN    Subjective:  She sitting up in the bedside chair, she just finished taking a shower. She is feeling better this morning. Shortness of breath is slightly better this morning than it was yesterday. She does not have any chest pain. She has a productive cough but is unsure what color her sputum is. She is tolerating orals, appetite is fair. Her colostomy is working. Examination:  Vitals: BP (!) 113/48   Pulse 92   Temp 98.2 °F (36.8 °C) (Oral)   Resp 20   Ht 5' 2\" (1.575 m)   Wt 291 lb 9.6 oz (132.3 kg)   SpO2 95%   BMI 53.33 kg/m²   General appearance: alert and cooperative with exam, up in chair  Neck: No JVD  Lungs: Moderate air exchange, no wheezing  Heart: regular rate and rhythm, S1, S2 normal, no gallop  Abdomen: Soft, non tender, + BS  Extremities: no cyanosis or clubbing. Trace edema    LABs:  CBC:   Recent Labs     03/10/21  0530 03/11/21  0544   WBC 21.8* 20.8*   HGB 8.9* 8.7*   HCT 29.5* 30.1*    355     BMP:   Recent Labs     03/10/21  0530 03/11/21  0544    135   K 4.0 4.2   CO2 30 27   BUN 23 26*   CREATININE 1.07* 1.14*   LABGLOM 50* 46*   GLUCOSE 192* 124*      Ref. Range 3/6/2021 14:32   Procalcitonin Latest Ref Range: <0.09 ng/mL 0.18 (H)     Radiology:  X-ray chest:  3/10/2021      CT cystogram 3/10/2021:  Impression   No evidence of urinary bladder injury or perforation.       Given that the installation of contrast was via Ornelas catheter situated in   the urinary bladder, the presence of urethral fistula cannot be assessed in   this exam since the Ornelas catheter bypasses the urethra.      Impression:  · Acute on chronic hypoxic respiratory insufficiency, on nocturnal oxygen  · Mild pulmonary edema  · Acute on chronic diastolic HF  · History of asthma  · Obstructive sleep apnea/morbid obesity, on CPAP therapy  · Status post exploratory laparotomy, sigmoid/superior rectum resection and creation of end colostomy and lysis of adhesions with mobilization of splenic flexure 02/25/2021  · SHARYN on CKD  · CAD, HLD, HTN MVR  · Postoperative ileus versus obstruction  · Leukocytosis, worsening, ? Sepsis     Recommendations:  · Oxygen by nasal cannula, keep SPO2 greater than 90%  · Home Oxygen evaluation prior to discharge   · Incentive spirometry every hour while awake, encourage  · Albuterol 4 times daily and prn  · Symbicort 160  · Mucomyst 400 mg TID via nebulizer   · Secretion management/hyperinflation protocol per RT  · Demadex and Aldactone per Cardiology  · Amiodarone   · Pain control   · ID following, monitor off of antibiotics.   Awaiting blood cultures and culture of PICC tip  · TPN as per surgery   · Wound care/Ostomy care   · PT/OT  · Labs: CBC and BMP in am  · DVT prophylaxis with subcu heparin  · Will follow with you    Electronically signed by     Alma Moyer MD on 3/11/2021 at 11:49 AM  Pulmonary Critical Care and Sleep Medicine,  [facility]  Cell: [phone]  Office: [phone]

## 2021-03-11 NOTE — PROGRESS NOTES
Occupational Therapy  Facility/Department: [facility] PROGRESSIVE CARE  Daily Treatment Note  NAME: Shoshana Newman  : 1945  MRN: [id number]    CATHY SCOTT reports patient is medically stable for therapy treatment this date. Chart reviewed prior to treatment and patient is agreeable for therapy. All lines intact and patient positioned comfortably at end of treatment. All patient needs addressed prior to ending therapy session. Date of Service: 3/11/2021    Discharge Recommendations:  Subacute/Skilled Nursing Facility  OT Equipment Recommendations  Equipment Needed: Yes  Mobility Devices: Walker  ADL Assistive Devices: Reacher;Long-handled Sponge;Long-handled Shoe Horn;Emergency Alert System    Assessment   Performance deficits / Impairments: Decreased functional mobility ; Decreased ADL status; Decreased safe awareness;Decreased endurance;Decreased balance;Decreased strength;Decreased sensation;Decreased high-level IADLs  Assessment: Continue with OT and focus on teach and train of AE use to increase overall I and ease with LB ADL's. Prognosis: Fair  OT Education: OT Role;Plan of Care;Precautions; Energy Conservation; ADL Adaptive Strategies;Transfer Training  Patient Education: safety in function, pursed lip breathing, AE use in function, proper body mech  REQUIRES OT FOLLOW UP: Yes  Activity Tolerance  Activity Tolerance: Patient Tolerated treatment well;Patient limited by fatigue;Patient limited by pain  Activity Tolerance: P+; pt fatigues easily  Safety Devices  Safety Devices in place: Yes  Type of devices: Left in chair;Nurse notified;Call light within reach;Gait belt;Patient at risk for falls(BLE's elevated)         Patient Diagnosis(es): There were no encounter diagnoses.       has a past medical history of Acute respiratory failure following trauma and surgery ([facility] [address] Adiposity, Arthralgia of multiple joints, Arthritis, Asthma, Asthma with acute exacerbation, Atopic rhinitis, CAD (coronary artery disease), CHF (congestive heart failure) (HCC), Chronic bilateral low back pain without sciatica, Closed fracture of proximal end of humerus, COPD (chronic obstructive pulmonary disease) ([facility] [address] Depression, Diverticulitis, Diverticulosis of large intestine, Diverticulosis of large intestine without hemorrhage, Encounter for mammogram to establish baseline mammogram, Essential hypertension, H/O hysterectomy for benign disease, Heart murmur, History of blood transfusion, History of mitral valve repair, Hyperlipidemia, Hypothermia due to anesthesia, Hypothermia due to anesthesia, Laceration of chest wall, Laceration of chest wall, Mitral incompetence, Morbid obesity ([facility] [address] Non-rheumatic mitral regurgitation, Polyp of sigmoid colon, Postmenopausal status, Routine general medical examination at a health care facility, Special screening for malignant neoplasms, colon, Special screening for malignant neoplasms, vagina, and Urinary incontinence. has a past surgical history that includes Appendectomy; Mitral valve repair; Hysterectomy; bronchoscopy; Cardiac catheterization (03/22/2017); Cardiac catheterization (11/18*/2016); Wound Exploration; other surgical history; Ovary removal; eye surgery (Bilateral); Colonoscopy; Endoscopy, colon, diagnostic; fracture surgery; and Small intestine surgery (N/A, 2/25/2021). Restrictions  Restrictions/Precautions  Restrictions/Precautions: General Precautions, Surgical Protocols, Fall Risk, Up as Tolerated  Required Braces or Orthoses?: No  Required Braces or Orthoses  Other: Abdominal Binder  Position Activity Restriction  Other position/activity restrictions:  Up with assist, abd sx/incision, colostomy, IV, garrido, O2 per NC, EDWAR diet, 1800 ml fluid restriction  Subjective   General  Chart Reviewed: Yes  Patient assessed for rehabilitation services?: Yes  Response to previous treatment: Patient with no complaints from previous session  Family / Caregiver Present: No  Pre Treatment Pain Screening  Intervention List: Nurse/Physician notified  Comments / Details: Pt states she has pain in her abd incision and rates 4/10; MD in room and aware  Vital Signs  Patient Currently in Pain: Yes   Orientation  Orientation  Overall Orientation Status: Within Normal Limits  Objective    ADL  Grooming: Setup;Supervision(for oral care seated in recliner)  UE Bathing: Unable to assess(pt states she had a shower this am prior to writer arrival)  LE Bathing: Unable to assess  UE Dressing: Setup;Minimal assistance(to adjust and tie hosp gown to increase modesty)  LE Dressing: Setup;Stand by assistance(for B socks only and pt was able to use sock aid with min cues needed and herminia B socks seated in chair with set up/SBA and increased time)  Toileting: Dependent/Total(garrido/colostomy)  Additional Comments: Attempted item retrieval with use of RW/reacher and CG for safety/balance. Pt was able to retrieve 1 item from floor with use of reacher and verbal instruction/demo on proper body mech/safety before stating she felt light headed and needed to sit and rest.  BP taken and read at 129/81 and O2 sats at 95% with O2 on per NC and MD present. Standing BP taken at 124/63 and RN Kizzy Terry was informed and readings given. Balance  Sitting Balance: Supervision  Standing Balance: Contact guard assistance(with RW as pt c/o light headedness)  Standing Balance  Time: stand cayetano 1-2 mins with RW for functional tasks; pt fatigues easily  Functional Mobility  Functional - Mobility Device: Rolling Walker  Activity: (recliner to door and back with increased time and limited as pt c/o feeling light headed and requesting to sit down)  Assist Level: Contact guard assistance  Functional Mobility Comments: MIN cues needed for upright posture, scanning, pursed lip breathing, slowing down with turning movements and awareness/assist with lines to increase overall safety/reduce falls.   Toilet Transfers  Toilet Transfers Comments: N/T and pt with garrido/colostomy  Bed mobility  Supine to Sit: Unable to assess  Sit to Supine: Unable to assess  Comment: Pt up in chair upon arrival and agreed to stay up;  BLE's elevated and pillow under and mult pillows placed behind pt to increase overall comfort/reduce edema in BLE's. Transfers  Stand Step Transfers: Contact guard assistance(with RW as pt c/o light headedness)  Sit to stand: Contact guard assistance(with increased time)  Stand to sit: Contact guard assistance  Transfer Comments: MIN cues needed for upright posture, hand placement reaching back to surface, scanning, pursed lip breathing and awareness/assist with all lines to increase overall safety. Pt with good hand placement. Cognition  Overall Cognitive Status: Exceptions  Arousal/Alertness: Appropriate responses to stimuli  Following Commands:  Follows multistep commands with increased time  Attention Span: Appears intact  Memory: Decreased short term memory  Safety Judgement: Decreased awareness of need for safety;Decreased awareness of need for assistance  Problem Solving: Assistance required to correct errors made;Assistance required to identify errors made;Decreased awareness of errors;Assistance required to generate solutions;Assistance required to implement solutions  Insights: Decreased awareness of deficits  Initiation: Requires cues for some  Sequencing: Requires cues for some                                         Plan   Plan  Times per week: 4-5x per week, 1-2x per day as cayetano  Times per day: Daily  Current Treatment Recommendations: Strengthening, Balance Training, Safety Education & Training, Self-Care / ADL, Equipment Evaluation, Education, & procurement, Endurance Training, Patient/Caregiver Education & Training, Home Management Training, Functional Mobility Training, Pain Management                                                    AM-PAC Score   15       Goals  Short term goals  Time Frame for Short term goals:

## 2021-03-11 NOTE — PROGRESS NOTES
DATE: 3/11/2021    NAME: Radha Hardy  MRN: [id number]   : 1945    Patient not seen this date for Physical Therapy due to:  [] Blood transfusion in progress  [] Cancel by RN  [] Hemodialysis  [x]  Refusal by Patient   [] Spine Precautions   [] Strict Bedrest  [] Surgery  [] Testing      [] Other        [] PT being discontinued at this time. Patient independent. No further needs. [] PT being discontinued at this time as the patient has been transferred to hospice care. No further needs.     RAUL NOONAN, PTA

## 2021-03-11 NOTE — PROGRESS NOTES
Anesthesia Start and Stop Event Times     Date Time Event    7/17/2020 1033 Ready for Procedure     1056 Anesthesia Start     1201 Anesthesia Stop        Responsible Staff  07/17/20    Name Role Begin End    Bertrand Cat M.D. Anesth 1107 1201        Preop Diagnosis (Free Text):  Pre-op Diagnosis     VASCULAR TUMOR        Preop Diagnosis (Codes):    Post op Diagnosis  Tumor, foot      Premium Reason  Non-Premium    Comments:                                                                        Pt took a shower today and tolerated it well. Pt abdominal wound redressed and covered with a bd. The patient tolerated the packing very well. Ornelas is removed at [phone]. Patient tolerated the procedure well. Will have a BSC in the room.  Made aware to call for help when needing to use the rest room

## 2021-03-11 NOTE — PROGRESS NOTES
[facility]  Follow Up      NAME:  Brandon Garce Todd RECORD NUMBER:  [id number]  AGE: 67 y.o. GENDER: female  : 1945  TODAY'S DATE:  3/11/2021      [facility] nursing follow up courtesy visit today. The patient states feeling better slowly and is beginning to tolerate intake. She denies any problems with the colostomy mgmt at this time. The current pouch is intact with loose brown effluent. Will cont to follow case to discharge in case ostomy needs arise.        Bhumi WILKESN, RN, [facility] [address]  Wound, Ostomy, and Continence Nursing  [phone]

## 2021-03-12 VITALS
RESPIRATION RATE: 18 BRPM | HEART RATE: 72 BPM | DIASTOLIC BLOOD PRESSURE: 45 MMHG | SYSTOLIC BLOOD PRESSURE: 140 MMHG | BODY MASS INDEX: 52.08 KG/M2 | WEIGHT: 283 LBS | HEIGHT: 62 IN | TEMPERATURE: 98.2 F | OXYGEN SATURATION: 100 %

## 2021-03-12 LAB
ABSOLUTE EOS #: 0.33 K/UL (ref 0–0.44)
ABSOLUTE IMMATURE GRANULOCYTE: 1.16 K/UL (ref 0–0.3)
ABSOLUTE LYMPH #: 1.49 K/UL (ref 1.1–3.7)
ABSOLUTE MONO #: 1.49 K/UL (ref 0.1–1.2)
ANION GAP SERPL CALCULATED.3IONS-SCNC: 10 MMOL/L (ref 9–17)
ANTI-NUCLEAR ANTIBODY (ANA): NEGATIVE
BASOPHILS # BLD: 1 % (ref 0–2)
BASOPHILS ABSOLUTE: 0.17 K/UL (ref 0–0.2)
BUN BLDV-MCNC: 30 MG/DL (ref 8–23)
BUN/CREAT BLD: 23 (ref 9–20)
CALCIUM SERPL-MCNC: 8.6 MG/DL (ref 8.6–10.4)
CHLORIDE BLD-SCNC: 100 MMOL/L (ref 98–107)
CO2: 29 MMOL/L (ref 20–31)
CREAT SERPL-MCNC: 1.29 MG/DL (ref 0.5–0.9)
CULTURE: ABNORMAL
CULTURE: NORMAL
CULTURE: NORMAL
DIFFERENTIAL TYPE: ABNORMAL
DIRECT EXAM: NORMAL
EOSINOPHILS RELATIVE PERCENT: 2 % (ref 1–4)
GFR AFRICAN AMERICAN: 49 ML/MIN
GFR NON-AFRICAN AMERICAN: 40 ML/MIN
GFR SERPL CREATININE-BSD FRML MDRD: ABNORMAL ML/MIN/{1.73_M2}
GFR SERPL CREATININE-BSD FRML MDRD: ABNORMAL ML/MIN/{1.73_M2}
GLUCOSE BLD-MCNC: 109 MG/DL (ref 65–105)
GLUCOSE BLD-MCNC: 122 MG/DL (ref 65–105)
GLUCOSE BLD-MCNC: 123 MG/DL (ref 65–105)
GLUCOSE BLD-MCNC: 136 MG/DL (ref 70–99)
HCT VFR BLD CALC: 28.7 % (ref 36.3–47.1)
HEMOGLOBIN: 8.4 G/DL (ref 11.9–15.1)
IMMATURE GRANULOCYTES: 7 %
INR BLD: 2.1
LYMPHOCYTES # BLD: 9 % (ref 24–43)
Lab: ABNORMAL
Lab: NORMAL
Lab: NORMAL
MAGNESIUM: 2 MG/DL (ref 1.6–2.6)
MCH RBC QN AUTO: 28.4 PG (ref 25.2–33.5)
MCHC RBC AUTO-ENTMCNC: 29.3 G/DL (ref 28.4–34.8)
MCV RBC AUTO: 97 FL (ref 82.6–102.9)
MONOCYTES # BLD: 9 % (ref 3–12)
NRBC AUTOMATED: 0 PER 100 WBC
PDW BLD-RTO: 15.4 % (ref 11.8–14.4)
PHOSPHORUS: 3.8 MG/DL (ref 2.6–4.5)
PLATELET # BLD: 390 K/UL (ref 138–453)
PLATELET ESTIMATE: ABNORMAL
PMV BLD AUTO: 10 FL (ref 8.1–13.5)
POTASSIUM SERPL-SCNC: 4 MMOL/L (ref 3.7–5.3)
PROTHROMBIN TIME: 23.4 SEC (ref 11.5–14.2)
RBC # BLD: 2.96 M/UL (ref 3.95–5.11)
RBC # BLD: ABNORMAL 10*6/UL
SEG NEUTROPHILS: 72 % (ref 36–65)
SEGMENTED NEUTROPHILS ABSOLUTE COUNT: 11.86 K/UL (ref 1.5–8.1)
SODIUM BLD-SCNC: 139 MMOL/L (ref 135–144)
SPECIMEN DESCRIPTION: ABNORMAL
SPECIMEN DESCRIPTION: NORMAL
SPECIMEN DESCRIPTION: NORMAL
WBC # BLD: 16.5 K/UL (ref 3.5–11.3)
WBC # BLD: ABNORMAL 10*3/UL

## 2021-03-12 PROCEDURE — 84100 ASSAY OF PHOSPHORUS: CPT

## 2021-03-12 PROCEDURE — 80048 BASIC METABOLIC PNL TOTAL CA: CPT

## 2021-03-12 PROCEDURE — 85025 COMPLETE CBC W/AUTO DIFF WBC: CPT

## 2021-03-12 PROCEDURE — 2580000003 HC RX 258: Performed by: STUDENT IN AN ORGANIZED HEALTH CARE EDUCATION/TRAINING PROGRAM

## 2021-03-12 PROCEDURE — 82947 ASSAY GLUCOSE BLOOD QUANT: CPT

## 2021-03-12 PROCEDURE — 99232 SBSQ HOSP IP/OBS MODERATE 35: CPT | Performed by: INTERNAL MEDICINE

## 2021-03-12 PROCEDURE — 94761 N-INVAS EAR/PLS OXIMETRY MLT: CPT

## 2021-03-12 PROCEDURE — C9113 INJ PANTOPRAZOLE SODIUM, VIA: HCPCS | Performed by: INTERNAL MEDICINE

## 2021-03-12 PROCEDURE — 6370000000 HC RX 637 (ALT 250 FOR IP): Performed by: INTERNAL MEDICINE

## 2021-03-12 PROCEDURE — 36415 COLL VENOUS BLD VENIPUNCTURE: CPT

## 2021-03-12 PROCEDURE — 97535 SELF CARE MNGMENT TRAINING: CPT

## 2021-03-12 PROCEDURE — 2700000000 HC OXYGEN THERAPY PER DAY

## 2021-03-12 PROCEDURE — 97110 THERAPEUTIC EXERCISES: CPT

## 2021-03-12 PROCEDURE — 6360000002 HC RX W HCPCS: Performed by: INTERNAL MEDICINE

## 2021-03-12 PROCEDURE — 83735 ASSAY OF MAGNESIUM: CPT

## 2021-03-12 PROCEDURE — 97530 THERAPEUTIC ACTIVITIES: CPT

## 2021-03-12 PROCEDURE — 6370000000 HC RX 637 (ALT 250 FOR IP): Performed by: STUDENT IN AN ORGANIZED HEALTH CARE EDUCATION/TRAINING PROGRAM

## 2021-03-12 PROCEDURE — 97116 GAIT TRAINING THERAPY: CPT

## 2021-03-12 PROCEDURE — 6370000000 HC RX 637 (ALT 250 FOR IP): Performed by: NURSE PRACTITIONER

## 2021-03-12 PROCEDURE — 94640 AIRWAY INHALATION TREATMENT: CPT

## 2021-03-12 PROCEDURE — 6360000002 HC RX W HCPCS: Performed by: NURSE PRACTITIONER

## 2021-03-12 PROCEDURE — 6360000002 HC RX W HCPCS: Performed by: STUDENT IN AN ORGANIZED HEALTH CARE EDUCATION/TRAINING PROGRAM

## 2021-03-12 PROCEDURE — 85610 PROTHROMBIN TIME: CPT

## 2021-03-12 RX ORDER — WARFARIN SODIUM 1 MG/1
TABLET ORAL
Qty: 30 TABLET | Refills: 3
Start: 2021-03-12

## 2021-03-12 RX ORDER — TORSEMIDE 20 MG/1
20 TABLET ORAL DAILY
Qty: 30 TABLET | Refills: 3
Start: 2021-03-13

## 2021-03-12 RX ORDER — WARFARIN SODIUM 1 MG/1
1 TABLET ORAL DAILY
Status: DISCONTINUED | OUTPATIENT
Start: 2021-03-12 | End: 2021-03-12 | Stop reason: HOSPADM

## 2021-03-12 RX ORDER — AMIODARONE HYDROCHLORIDE 200 MG/1
100 TABLET ORAL DAILY
Status: DISCONTINUED | OUTPATIENT
Start: 2021-03-13 | End: 2021-03-12 | Stop reason: HOSPADM

## 2021-03-12 RX ORDER — WARFARIN SODIUM 3 MG/1
3 TABLET ORAL DAILY
Status: DISCONTINUED | OUTPATIENT
Start: 2021-03-12 | End: 2021-03-12

## 2021-03-12 RX ORDER — AMIODARONE HYDROCHLORIDE 100 MG/1
100 TABLET ORAL DAILY
Qty: 30 TABLET | Refills: 5
Start: 2021-03-13

## 2021-03-12 RX ADMIN — TORSEMIDE 20 MG: 20 TABLET ORAL at 09:12

## 2021-03-12 RX ADMIN — SUCRALFATE 1 G: 1 TABLET ORAL at 05:26

## 2021-03-12 RX ADMIN — METOPROLOL TARTRATE 25 MG: 25 TABLET, FILM COATED ORAL at 09:00

## 2021-03-12 RX ADMIN — ALBUTEROL SULFATE 2.5 MG: 2.5 SOLUTION RESPIRATORY (INHALATION) at 11:31

## 2021-03-12 RX ADMIN — ALBUTEROL SULFATE 2.5 MG: 2.5 SOLUTION RESPIRATORY (INHALATION) at 08:11

## 2021-03-12 RX ADMIN — HEPARIN SODIUM 5000 UNITS: 5000 INJECTION INTRAVENOUS; SUBCUTANEOUS at 14:40

## 2021-03-12 RX ADMIN — SUCRALFATE 1 G: 1 TABLET ORAL at 11:02

## 2021-03-12 RX ADMIN — AMIODARONE HYDROCHLORIDE 200 MG: 200 TABLET ORAL at 09:00

## 2021-03-12 RX ADMIN — ATORVASTATIN CALCIUM 20 MG: 20 TABLET, FILM COATED ORAL at 09:00

## 2021-03-12 RX ADMIN — ACETYLCYSTEINE 400 MG: 200 SOLUTION ORAL; RESPIRATORY (INHALATION) at 08:11

## 2021-03-12 RX ADMIN — ACETAMINOPHEN 1000 MG: 500 TABLET ORAL at 14:39

## 2021-03-12 RX ADMIN — CITALOPRAM HYDROBROMIDE 10 MG: 10 TABLET ORAL at 09:00

## 2021-03-12 RX ADMIN — HEPARIN SODIUM 5000 UNITS: 5000 INJECTION INTRAVENOUS; SUBCUTANEOUS at 05:27

## 2021-03-12 RX ADMIN — PANTOPRAZOLE SODIUM 40 MG: 40 INJECTION, POWDER, FOR SOLUTION INTRAVENOUS at 09:00

## 2021-03-12 RX ADMIN — FLUTICASONE PROPIONATE 1 SPRAY: 50 SPRAY, METERED NASAL at 09:01

## 2021-03-12 RX ADMIN — SPIRONOLACTONE 25 MG: 25 TABLET ORAL at 09:00

## 2021-03-12 RX ADMIN — GUAIFENESIN 600 MG: 600 TABLET, EXTENDED RELEASE ORAL at 09:00

## 2021-03-12 RX ADMIN — SODIUM CHLORIDE, PRESERVATIVE FREE 10 ML: 5 INJECTION INTRAVENOUS at 09:00

## 2021-03-12 RX ADMIN — ACETAMINOPHEN 1000 MG: 500 TABLET ORAL at 05:26

## 2021-03-12 RX ADMIN — BUPROPION HYDROCHLORIDE 150 MG: 150 TABLET, EXTENDED RELEASE ORAL at 09:00

## 2021-03-12 ASSESSMENT — ENCOUNTER SYMPTOMS
RESPIRATORY NEGATIVE: 1
GASTROINTESTINAL NEGATIVE: 1

## 2021-03-12 ASSESSMENT — PAIN SCALES - GENERAL
PAINLEVEL_OUTOF10: 0
PAINLEVEL_OUTOF10: 3
PAINLEVEL_OUTOF10: 0
PAINLEVEL_OUTOF10: 3

## 2021-03-12 NOTE — PROGRESS NOTES
Pt discharged to [facility] [place]. Able to visit at 's bedside in 1022 prior to discharge. All belongings sent with patient including clothes, CPAP, phone and . Report called to [facility].

## 2021-03-12 NOTE — PROGRESS NOTES
Subjective: Breathing is feeling ok. Denies CP. Plans for DC to SNF today.      VS: BP (!) 140/45   Pulse 72   Temp 98.2 °F (36.8 °C) (Oral)   Resp 18   Ht 5' 2\" (1.575 m)   Wt 283 lb (128.4 kg)   SpO2 100%   BMI 51.76 kg/m²     Wt:     I/O: In: -   Out: 75     Monitor: SR    Meds: Scheduled Meds:   warfarin  3 mg Oral Daily    metoprolol tartrate  25 mg Oral BID    acetaminophen  1,000 mg Oral 3 times per day    amiodarone  200 mg Oral Daily    warfarin (COUMADIN) daily dosing (placeholder)   Other RX Placeholder    albuterol  2.5 mg Nebulization 4x daily    insulin lispro  0-6 Units Subcutaneous 4 times per day    heparin (porcine)  5,000 Units Subcutaneous 3 times per day    guaiFENesin  600 mg Oral BID    sucralfate  1 g Oral TID AC    pantoprazole  40 mg Intravenous Daily    fluticasone  1 spray Nasal Daily    atorvastatin  20 mg Oral Daily    buPROPion  150 mg Oral Daily    citalopram  10 mg Oral Daily    spironolactone  25 mg Oral Daily    torsemide  20 mg Oral Daily    sodium chloride flush  10 mL Intravenous 2 times per day     Continuous Infusions:   dextrose      dextrose 5% and 0.45% NaCl with KCl 20 mEq 10 mL/hr at 03/06/21 1011     PRN Meds:.oxyCODONE, sodium chloride flush, phenol, glucose, dextrose, glucagon (rDNA), dextrose, calcium carbonate, sodium chloride flush, ondansetron     Exam:General Appearance:AOX3, NAD  Skin: warm, dry  Pulmonary/Chest:Diminished at bases anteriorly  Cardiovascular: S1S2, RRR, negative JVD  Extremities: No peripheral edema    Labs:   CBC with Differential:    Lab Results   Component Value Date    WBC 16.5 03/12/2021    RBC 2.96 03/12/2021    HGB 8.4 03/12/2021    HCT 28.7 03/12/2021     03/12/2021    MCV 97.0 03/12/2021    MCH 28.4 03/12/2021    MCHC 29.3 03/12/2021    RDW 15.4 03/12/2021    LYMPHOPCT 9 03/12/2021    MONOPCT 9 03/12/2021    BASOPCT 1 03/12/2021    MONOSABS 1.49 03/12/2021    LYMPHSABS 1.49 03/12/2021    EOSABS 0.33 03/12/2021    BASOSABS 0.17 03/12/2021    DIFFTYPE NOT REPORTED 03/12/2021       BMP:    Lab Results   Component Value Date     03/12/2021    K 4.0 03/12/2021     03/12/2021    CO2 29 03/12/2021    BUN 30 03/12/2021    LABALBU 2.5 03/06/2021    CREATININE 1.29 03/12/2021    CALCIUM 8.6 03/12/2021    GFRAA 49 03/12/2021    LABGLOM 40 03/12/2021    GLUCOSE 136 03/12/2021     Warfarin PT/INR:    Lab Results   Component Value Date    PROTIME 23.4 03/12/2021    INR 2.1 03/12/2021         A/P:1. Atrial fibrillation  -Currently in SR. On Amiodarone and Lopressor. AC with Coumadin. INR 2.1 today. Will decrease Coumadin to 3mg QD. INR Mon. Our office will manage.      2. CHF  -In negative balance. Weight down. On Demadex and Aldactone. BMP in one week.      3. HTN  -Blood pressure well controlled.     4. HC  -On Statin.     5. Leukocytosis  -ID following. Monitoring antibiotics.     6. Anemia  -Hemoglobin trending down. Check Iron. No objection to DC from CV standpoint. F/U in 4 weeks. Will discuss with Dr. Celia Lu    Discussed with Dr. Celia Lu. Decrease Amiodarone to 100mg daily and Coumadin to 1mg QD.      Electronically signed by ALEX Hartman CNP on 3/12/2021 at 12:46 PM

## 2021-03-12 NOTE — PROGRESS NOTES
Physical Therapy  Facility/Department: [facility] PROGRESSIVE CARE  Daily Treatment Note  NAME: Leigh Ann Reyna  : 1945  MRN: [id number]    Date of Service: 3/12/2021    Discharge Recommendations:  Subacute/Skilled Nursing Facility   PT Equipment Recommendations  Equipment Needed: Yes  Walker: Rolling  Assessment   Body structures, Functions, Activity limitations: Decreased functional mobility ; Decreased endurance;Decreased strength;Decreased balance;Decreased safe awareness; Increased pain  Assessment: Pt able to ambulate further distance this date, with noted increased SOB d/t decreased activity tolerance. SpO2 82% upon completion of amb, recovered to 100% after sitting 2-3 minutes. Patient with deficits in mobility, strength, balance, endurance, and limited by pain, requiring increased time to complete tasks. Patient would benefit from continued skilled PT to increase strength, gait balance and overall safety. Recommend SNF at time of D/C. Prognosis: Excellent  Decision Making: High Complexity  PT Education: Transfer Training;Energy Conservation; Functional Mobility Training;General Safety;Gait Training  Patient Education: emphasized the importance of proper breathing elian with exertion to optimize lung expansion and decrease SOB episodes. Edu pt on RW management and proper postural alignment to promote scapular retraction and increase stability/balance. REQUIRES PT FOLLOW UP: Yes  Activity Tolerance  Activity Tolerance: Patient limited by fatigue;Patient limited by endurance  Activity Tolerance: Pt amb 20' x 4 this date after not amb the last 2 days. Pt became SOB more quickly this date. After amb monitored SpO2, 82% seated in recliner, recovered to 100% after 2-3 minutes of seated rest break. Patient Diagnosis(es): There were no encounter diagnoses.      has a past medical history of Acute respiratory failure following trauma and surgery ([facility] [address] Adiposity, Arthralgia of multiple joints, Arthritis, Asthma, Asthma with acute exacerbation, Atopic rhinitis, CAD (coronary artery disease), CHF (congestive heart failure) (HCC), Chronic bilateral low back pain without sciatica, Closed fracture of proximal end of humerus, COPD (chronic obstructive pulmonary disease) ([facility] [address] Depression, Diverticulitis, Diverticulosis of large intestine, Diverticulosis of large intestine without hemorrhage, Encounter for mammogram to establish baseline mammogram, Essential hypertension, H/O hysterectomy for benign disease, Heart murmur, History of blood transfusion, History of mitral valve repair, Hyperlipidemia, Hypothermia due to anesthesia, Hypothermia due to anesthesia, Laceration of chest wall, Laceration of chest wall, Mitral incompetence, Morbid obesity ([facility] [address] Non-rheumatic mitral regurgitation, Polyp of sigmoid colon, Postmenopausal status, Routine general medical examination at a health care facility, Special screening for malignant neoplasms, colon, Special screening for malignant neoplasms, vagina, and Urinary incontinence. has a past surgical history that includes Appendectomy; Mitral valve repair; Hysterectomy; bronchoscopy; Cardiac catheterization (03/22/2017); Cardiac catheterization (11/18*/2016); Wound Exploration; other surgical history; Ovary removal; eye surgery (Bilateral); Colonoscopy; Endoscopy, colon, diagnostic; fracture surgery; and Small intestine surgery (N/A, 2/25/2021). Restrictions  Restrictions/Precautions  Restrictions/Precautions: General Precautions, Surgical Protocols, Fall Risk, Up as Tolerated  Required Braces or Orthoses?: No  Required Braces or Orthoses  Other: Abdominal Binder  Position Activity Restriction  Other position/activity restrictions: Up with assist, abd sx/incision, colostomy, IV, garrido, O2 per NC, EDWAR diet, 1800 ml fluid restriction  Subjective   General  Chart Reviewed: Yes  Response To Previous Treatment: Patient with no complaints from previous session.   Family / Caregiver Present: No Subjective  Subjective: Pt alert and agreeable to PT treatment. General Comment  Comments: RN Deb reports patient medically appropriate for PT treatment  Orientation  Orientation  Overall Orientation Status: Within Functional Limits  Cognition   Cognition  Overall Cognitive Status: Exceptions  Arousal/Alertness: Appropriate responses to stimuli  Following Commands: Follows multistep commands with increased time  Attention Span: Appears intact  Memory: Decreased short term memory  Safety Judgement: Decreased awareness of need for safety;Decreased awareness of need for assistance  Problem Solving: Assistance required to correct errors made;Assistance required to identify errors made;Decreased awareness of errors;Assistance required to generate solutions;Assistance required to implement solutions  Insights: Decreased awareness of deficits  Initiation: Requires cues for some  Sequencing: Requires cues for some  Objective   Bed mobility  Comment: Unable to assess as patient up in recliner chair upon arrival. At end of treatment session, agreed to stay up; VIRGILIO LE's elevated and pillow under and several pillows behind to increase comfort and reduce edema in BLEs. Transfers  Sit to Stand: Minimal Assistance  Stand to sit: Minimal Assistance  Bed to Chair: (unable to assess as pt in recliner upon arrival)  Stand Pivot Transfers: Contact guard assistance;Minimal Assistance  Lateral Transfers: Contact guard assistance;Minimal Assistance  Comment: Pt completed 2 sit<>stands with proper hand placement and sequencing with RW. MIN A req for O2 line management, assist for sit>stand transfer from toilet, & donning/doffing of shoes, which decrease pt's pain in R foot. Increased time req to complete tasks d/t increased fatigue and decreased activity tolerance.   Ambulation  Ambulation?: Yes  More Ambulation?: No  Ambulation 1  Surface: level tile  Device: Rolling Walker  Other Apparatus: O2  Assistance: Contact guard assistance;Minimal assistance  Quality of Gait: Patient with slow careful gait pattern. Step to sequencing with decreased toe clearance. Gait Deviations: Slow Teena; Increased DAYSI; Decreased step length;Decreased step height  Distance: 20' x 4  Comments: Patient amb from recliner chair > door> around bed > bathroom & back to recliner, MIN A for VCs and tactile cues for RW management, staying within DAYSI RW, for O2 line management. No LOB noted, pt became SOB after using bathroom. Exercises  Comments: Pt completed seated AROM VIRGILIO LE x 10 reps to promote joint congruency and build LE strength for enhanced stability for pre-gait activities. AM-PAC Score  AM-PAC Inpatient Mobility Raw Score : 15 (03/12/21 1206)  AM-PAC Inpatient T-Scale Score : 39.45 (03/12/21 1206)  Mobility Inpatient CMS 0-100% Score: 57.7 (03/12/21 1206)  Mobility Inpatient CMS G-Code Modifier : CK (03/12/21 1206)    Goals  Short term goals  Time Frame for Short term goals: 12 visits  Short term goal 1: Pt to be indep with all bed mobility, demo'ing log rolling  Short term goal 2: Pt to be indep with all functional transfers  Short term goal 3: Pt will ambulate 200ft with RW/LRAD and SBA  Short term goal 4: Pt will tolerate 25mins of PT including therex, theract, gait training and neuro re-ed to improve functional mobiilty and activity tolerance. Patient Goals   Patient goals : To feel better    Plan    Plan  Times per week: 1-2x day / 5-6 days per week  Current Treatment Recommendations: Strengthening, Transfer Training, Endurance Training, Neuromuscular Re-education, Patient/Caregiver Education & Training, Balance Training, Gait Training, Home Exercise Program, Functional Mobility Training, Safety Education & Training, Positioning  Safety Devices  Type of devices:  All fall risk precautions in place, Call light within reach, Left in chair, Gait belt, Nurse notified  Restraints  Initially in place: No     Therapy Time   Individual Concurrent Group Co-treatment   Time In 20[address] [facility]         Time Out 1128         Minutes 30                 Jose Upton, Student Physical Therapist Assistant

## 2021-03-12 NOTE — CARE COORDINATION
Social work: advised rep for [facility] [place] that plans are 6 pm ambulance . Rep cell [phone] and fax abel [phone]. Report [phone]. Planning 6 pm transfer tonight. Will need abel and Rx at discharge. Will check hens. Carmencita urbina      Time changed to 4 pm left message for rep/snf.  Carmencita urbina

## 2021-03-12 NOTE — PROGRESS NOTES
[facility] 30 SURGERY  PROGRESS NOTE    Patient Name: Tamara Ivory  Patient MRN: [id number]  Date: 3/12/2021; 7:22 AM    CC: feeling better    Subjective:   Patient seen and chart reviewed. No acute events overnight. No fever or chills. Denies N/V. Tolerating diet. Pain minimal this am. Vitals WNL. Objective:    Vitals:    03/12/21 0405   BP: (!) 137/55   Pulse: 77   Resp: 20   Temp: 97.9 °F (36.6 °C)   SpO2: 99%        PHYSICAL EXAM  GEN: Alert, awake, oriented, NAD  HEENT: moist mucous membranes  CV: S1/S2  LUNG: no respiratory distress, no audible wheezing  ABDOMEN: soft, NT, ND, colostomy functioning with stool in appliance       I/O last 3 completed shifts:  In: -   Out: 75 [Stool:75]    Labs:    CBC:   Lab Results   Component Value Date    WBC 16.5 03/12/2021    RBC 2.96 03/12/2021    HGB 8.4 03/12/2021    HCT 28.7 03/12/2021    MCV 97.0 03/12/2021    MCH 28.4 03/12/2021    MCHC 29.3 03/12/2021    RDW 15.4 03/12/2021     03/12/2021    MPV 10.0 03/12/2021     BMP:    Lab Results   Component Value Date     03/12/2021    K 4.0 03/12/2021     03/12/2021    CO2 29 03/12/2021    BUN 30 03/12/2021    LABALBU 2.5 03/06/2021    CREATININE 1.29 03/12/2021    CALCIUM 8.6 03/12/2021    GFRAA 49 03/12/2021    LABGLOM 40 03/12/2021    GLUCOSE 136 03/12/2021     CRP (3/8): 183.2    Pathology (2/25/21):    Source of Specimen   1: SIGMOID AND UPPER RECTUM     SIGMOID COLON AND UPPER RECTUM, SEGMENTAL RESECTION:             -  PERFORATED DIVERTICULITIS WITH SEROSAL ADHESIONS AND   ADJACENT MURAL ABSCESS.        -  MARGINS APPEAR VIABLE.        -  BENIGN REACTIVE MESENTERIC LYMPH NODES. Gross Description   \"NOE LUIS ANTONIO, SIGMOID AND UPPER RECTUM\" 15.0 cm long x 4.0 cm in circumference segment of colon with a large amount of mesenteric fat.  The serosa is pink-tan with adhesions and in an area of dense adhesions, there is a 0.2 cm transmural defect that is 4.0 cm from the closest margin.  The mucosa is pink-tan with diverticula up to 1.5 cm and in the center of the segment, away from the aforementioned defect, there is a region of cavitation within the wall and fat consistent with abscess. Wendi Lolling are no masses.  The wall ranges in thickness from 0.2 to 0.8 cm with no marked stricture.  Within the fat there are a few rubbery nodes up to 0.4 cm.  Cassette summary:  \"A\" resection margins shave, \"B-C\" transmural defect, \"D\" separate abscess and representative nodes. Radiology:  Xr Chest 1 View  Result Date: 3/7/2021  Interval placement of right sided PICC line with tip within the mid SVC. The remainder of the findings are stable. 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 -Diet as tolerated  -Supportive care/pain control PRN  -Appreciated ID input. Leukocytosis decreasing. Afebrile. Cultures show contaminant. Monitoring off Abx.  -Encourage mobility/ambulation/out of bed  -Frequent IS  -DVT ppx hep TID  -BID Packing changes to incision. Shower daily.  -Stable for D/C -awaiting precert      Electronically signed by Gila Birmingham DO on 3/12/2021 at 7:26 AM  I Dr. Stefan Haile saw and examined the patient. I have edited the above and agree with the above. Nehal Ricks  Colorectal Surgery

## 2021-03-12 NOTE — PROGRESS NOTES
Nutrition Assessment     Type and Reason for Visit: Reassess    Nutrition Recommendations/Plan:   1. Continue DIET GENERAL; No Added Salt (3-4 GM); Daily Fluid Restriction: 1800 ml  2. Continue Ensure High Protein 2x/day  3. Monitor p.o intakes, diet tolerance and labs    Nutrition Assessment:  Patient is tolerating current diet and her appetite is improving . Patient is eating about 51-75% at meals. Continue current diet  and Ensure High Protein 2x/day. Monitor p.o intakes, diet tolerance and labs. Malnutrition Assessment:  Malnutrition Status: Mild malnutrition    Estimated Daily Nutrient Needs:  Energy (kcal): 0048-7827 (9-11 kcal/kg); Weight Used for Energy Requirements:  Admission     Protein (g): 100-110 (2.0-2.2 g/kg IBW); Weight Used for Protein Requirements:  Ideal           Nutrition Related Findings: Trace BLE edema. Present bowel sounds. S/P Exploratory laparotomy, takedown of colovesical fistula secondary to diverticular disease, rectosigmoid colonic resection end colostomy 2/26      Current Nutrition Therapies:    DIET GENERAL; No Added Salt (3-4 GM);  Daily Fluid Restriction: 1800 ml  Dietary Nutrition Supplements:    Anthropometric Measures:  · Height: 5' 2\" (157.5 cm)  · Current Body Wt: 283 lb (128.4 kg)   · BMI: 51.7    Nutrition Diagnosis:   · Mild malnutrition, In context of acute illness or injury related to inadequate protein-energy intake as evidenced by poor intake prior to admission, weight loss    Nutrition Interventions:   Food and/or Nutrient Delivery:  Continue Current Diet, Continue Oral Nutrition Supplement  Nutrition Education/Counseling:  Education not indicated   Coordination of Nutrition Care:  Continue to monitor while inpatient    Goals:  PO intakes will be greater than 75% at meals       Nutrition Monitoring and Evaluation:   Behavioral-Environmental Outcomes:  None Identified   Food/Nutrient Intake Outcomes:  Food and Nutrient Intake, Supplement Intake  Physical Signs/Symptoms Outcomes:  Biochemical Data, Fluid Status or Edema, Skin, Weight, GI Status     Discharge Planning:    Continue current diet, Continue Oral Nutrition Supplement         Adrianne PLEITEZN, RDN, LDN  Lead Clinical Dietitian  RD Office Phone [phone]

## 2021-03-12 NOTE — PROGRESS NOTES
Pulmonary Critical Care Progress Note  Iram Bragg MD     Patient seen for the follow up of acute respiratory insufficiency, CHF, asthma, JAYDEN    Subjective:  No significant overnight events noted. She is sitting up in the bedside chair washing up. She is feeling better this morning. This of breath is getting better every day. She does not have any chest pain. She has a productive cough with creamy white sputum. She is tolerating orals, appetite is improving. Her colostomy is working. Examination:  Vitals: BP (!) 150/52   Pulse 79   Temp 97.9 °F (36.6 °C) (Oral)   Resp 20   Ht 5' 2\" (1.575 m)   Wt 283 lb (128.4 kg)   SpO2 98%   BMI 51.76 kg/m²   General appearance: alert and cooperative with exam, up in chair  Neck: No JVD  Lungs: Moderate air exchange, no wheezing  Heart: regular rate and rhythm, S1, S2 normal, no gallop  Abdomen: Soft, non tender, + BS  Extremities: no cyanosis or clubbing. Trace edema    LABs:  CBC:   Recent Labs     03/11/21  0544 03/12/21  0512   WBC 20.8* 16.5*   HGB 8.7* 8.4*   HCT 30.1* 28.7*    390     BMP:   Recent Labs     03/11/21 0544 03/12/21  0512    139   K 4.2 4.0   CO2 27 29   BUN 26* 30*   CREATININE 1.14* 1.29*   LABGLOM 46* 40*   GLUCOSE 124* 136*      Ref. Range 3/6/2021 14:32   Procalcitonin Latest Ref Range: <0.09 ng/mL 0.18 (H)     Radiology:  X-ray chest:  3/10/2021      CT cystogram 3/10/2021:  Impression   No evidence of urinary bladder injury or perforation.       Given that the installation of contrast was via Ornelas catheter situated in   the urinary bladder, the presence of urethral fistula cannot be assessed in   this exam since the Ornelas catheter bypasses the urethra.      Impression:  · Acute on chronic hypoxic respiratory insufficiency, on nocturnal oxygen  · Mild pulmonary edema  · Acute on chronic diastolic HF  · History of asthma  · Obstructive sleep apnea/morbid obesity, on CPAP therapy  · Status post exploratory laparotomy, sigmoid/superior rectum resection and creation of end colostomy and lysis of adhesions with mobilization of splenic flexure 02/25/2021  · SHARYN on CKD  · CAD, HLD, HTN MVR  · Postoperative ileus versus obstruction  · Leukocytosis, worsening, ? Sepsis   · Positive blood culture, 1 of 2 from 3/10/2021 staph epidermidis    Recommendations:  · Oxygen by nasal cannula, keep SPO2 greater than 90%  · Home Oxygen evaluation prior to discharge   · Incentive spirometry every hour while awake, encourage  · Albuterol 4 times daily and prn  · Symbicort 160  · Mucomyst 400 mg TID via nebulizer   · Secretion management/hyperinflation protocol per RT  · Demadex and Aldactone per Cardiology  · Amiodarone   · Pain control   · ID following, monitor off of antibiotics  · TPN as per surgery   · Wound care/Ostomy care   · PT/OT  · Labs: CBC and BMP in am  · DVT prophylaxis with subcu heparin  · Okay for discharge to SNF from pulmonary standpoint. She can follow-up with her pulmonologist Dr. Cornelio Knox in 1 to 2 weeks after discharge from rehab.     Electronically signed by     Sally Buck MD on 3/12/2021 at 1:47 PM  Pulmonary Critical Care and Sleep Medicine,  [facility]  Cell: [phone]  Office: [phone]

## 2021-03-12 NOTE — PROGRESS NOTES
[facility]  Progress Note    Cherry Dobbs  MRN: [id number]  Date: 3/12/2021  LOS: 15     Reason for F/U :   Systemic inflammatory response syndrome, leukocytosis    Impression :   1. 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 Acute on chronic respiratory insufficiency  3. Acute on chronic diastolic heart failure with mild pulmonary edema  4. Morbid obesity  5. Acute kidney injury on chronic kidney disease  6. E. coli UTI   · status post antimicrobial therapy  7. Leukocytosis  · Improving  8. Coagulase-negative staph in 1 out of 2 sets of blood cultures  · represents a contaminant    Recommendations:   · Clinically the patient continues to do well. · She does have openings in the anterior abdominal incision and has packing in place. · There are no other/major overt signs of infection. · The leukocytosis overall is improved. · The patient is stable for discharge from an infectious disease standpoint. · There is no plan for any antimicrobial therapy at this time    Infection Control Recommendations:   Universal precautions    Discharge Planning:   Patient will need Midline Catheter Insertion/ PICC line Insertion: No  Patient will need: Home IV , [place],  SNF,  LTAC: Undetermined  Patient willneed outpatient wound care: Yes    Medical Decision making / Summary of Stay:   Cherry Dobbs is a 67y.o.-year-old female who was initially admitted on 2/25/2021.    Brian Oh has a history of asthma/COPD obstructive sleep apnea on BiPAP, coronary artery disease, congestive heart failure, morbid obesity hypertension, mitral valve repair 3 years ago, chronic kidney disease stage IIIa, frequent recurrent UTIs for more than 6 months and underwent a cystoscopy was discovered to have a colovesicular fistula related to diverticulitis for which the patient was referred to Dr. Nicki Prieto who recommended surgical intervention.     The patient was admitted and underwent flexible sigmoidoscopy, exploratory laparotomy, sigmoid/superior rectum resection with creation of end ileostomy and mobilization of the splenic flexure and lysis of adhesions on 2021.     Postoperatively the patient did develop acute on chronic hypoxic respiratory insufficiency requiring nocturnal oxygen. The patient did have mild paralytic ileus postoperatively.     The patient was subsequently started on ciprofloxacin for a urinary tract infection and this morning the white blood cell count was up to 16.6. The patient was found to have a new cough and she has been on 4 L of oxygen by nasal cannula. There was concern that the patient was becoming septic and antibiotic coverage was broadened this morning to Zosyn and vancomycin.     A CAT scan of the chest, abdomen, pelvis was ordered due to concern for intra-abdominal abscess and there was no evidence of acute pulmonary embolism but was noted to have a postop findings with a small amount of dependent fluid in the pelvis and with the rim enhancement. No loculated fluid collection.     The patient reports feeling some shortness of breath earlier today but now is feeling better. No fevers, chills, sweats, abdominal pain, nausea, vomiting or diarrhea. No dysuria or frequency. Current evaluation:3/12/2021    BP (!) 150/52   Pulse 79   Temp 97.9 °F (36.6 °C) (Oral)   Resp 20   Ht 5' 2\" (1.575 m)   Wt 283 lb (128.4 kg)   SpO2 98%   BMI 51.76 kg/m²     Temperature Range: Temp: 97.9 °F (36.6 °C) Temp  Av.2 °F (36.8 °C)  Min: 97.9 °F (36.6 °C)  Max: 98.6 °F (37 °C)  The patient is seen and evaluated at bedside she is awake and alert in no acute distress. No subjective fever or chills. No abdominal pain nausea vomiting or diarrhea. She does report feeling a little bit under the weather \"not quite herself\"  She is tolerating oral intake.     Review of Systems Constitutional: Positive for fatigue. Respiratory: Negative. Cardiovascular: Negative. Gastrointestinal: Negative. Genitourinary: Negative. Musculoskeletal: Negative. Skin: Positive for wound. Neurological: Negative. Psychiatric/Behavioral: Negative. Physical Examination :     Physical Exam  Constitutional:       Appearance: She is well-developed. She is obese. HENT:      Head: Normocephalic and atraumatic. Neck:      Musculoskeletal: Normal range of motion and neck supple. Cardiovascular:      Rate and Rhythm: Regular rhythm. Heart sounds: Normal heart sounds. Pulmonary:      Effort: Pulmonary effort is normal.      Breath sounds: Normal breath sounds. Abdominal:      General: Bowel sounds are normal.      Palpations: Abdomen is soft. Comments: Midline incision with packing intermittently  Left lower quadrant ostomy in place   Skin:     General: Skin is warm and dry. Neurological:      Mental Status: She is alert and oriented to person, place, and time.          Laboratory data:   I have independently reviewed the followinglabs:  CBC with Differential:   Recent Labs     03/11/21  0544 03/12/21  0512   WBC 20.8* 16.5*   HGB 8.7* 8.4*   HCT 30.1* 28.7*    390   LYMPHOPCT 6* 9*   MONOPCT 7 9     BMP:   Recent Labs     03/11/21  0544 03/12/21  0512    139   K 4.2 4.0   CL 98 100   CO2 27 29   BUN 26* 30*   CREATININE 1.14* 1.29*   MG 2.0 2.0     Hepatic Function Panel:   Recent Labs     03/11/21  1224   PROT 6.5         Lab Results   Component Value Date    PROCAL 0.18 03/06/2021     Lab Results   Component Value Date    .2 03/08/2021     Lab Results   Component Value Date    SEDRATE 102 (H) 03/08/2021         No results found for: DDIMER  No results found for: FERRITIN  No results found for: LDH  No results found for: FIBRINOGEN    Results in Past 30 Days  Result Component Current Result Ref Range Previous Result Ref Range   SARS-CoV-2 Not Detected (3/10/2021) Not Detected      (2/21/2021)          (3/10/2021)  Not Detected (2/21/2021) Not Detected     Lab Results   Component Value Date    COVID19 Not Detected 03/10/2021    COVID19 Not Detected 02/21/2021       No results for input(s): VANCOTROUGH in the last 72 hours. Imaging Studies:   CT CYSTOGRAM 3/10/2021 5:08 pm  Impression   No evidence of urinary bladder injury or perforation.       Given that the installation of contrast was via Ornelas catheter situated in   the urinary bladder, the presence of urethral fistula cannot be assessed in   this exam since the Ornelas catheter bypasses the urethra. ONE XRAY VIEW OF THE CHEST 3/10/2021 8:13 am  Impression   Mild cardiomegaly and chronic pulmonary change with presumed bibasilar   atelectasis. Cultures:     Culture, Blood 1 [[id number]] (Abnormal) Collected: 03/10/21 0750   Order Status: Completed Specimen: Blood Updated: 03/12/21 0840    Specimen Description . BLOOD    Special Requests LW    Culture POSITIVE Blood Culture Results called to and read back by: Erick Stephenson. RN AT 1258 ON 3.11. 21Abnormal      DIRECT GRAM STAIN FROM BOTTLE: GRAM POSITIVE COCCI IN PAIRS     Staphylococcus epidermidis Detected:   mecA/C Gene Detected- Methicillin Resistant Organism   Methodology- Polymerase Chain Reaction (PCR)   Abnormal      STAPHYLOCOCCUS EPIDERMIDIS A single positive blood culture of coagulase negative Staphylocci, diptheroids, micrococci, Cutibacterium, viridans Streptocci, Bacillus, or Lactobacillus species should be interpreted with caution and viewed as a likely skin contaminant. Abnormal    Culture, Aerobic [[id number]] Collected: 03/10/21 1553   Order Status: Completed Specimen: Catheter Tip Updated: 03/12/21 0729    Specimen Description . CATHETER TIP    Special Requests NOT REPORTED    Direct Exam NOT REPORTED    Culture NO SIGNIFICANT GROWTH   Culture, Blood 1 [[id number]] Collected: 03/10/21 0740   Order Status: Completed Specimen: Blood Updated: 03/12/21 0041    Specimen Description . BLOOD    Special Requests RH    Culture NO GROWTH 2 DAYS   Culture, Blood 1 [[id number]] Collected: 03/06/21 1816   Order Status: Completed Specimen: Blood Updated: 03/12/21 0040    Specimen Description . BLOOD    Special Requests NOT REPORTED    Culture NO GROWTH 6 DAYS     Culture, Urine [[id number]] (Abnormal)  Collected: 03/06/21 1700   Order Status: Completed Specimen: Urine Random Updated: 03/08/21 0923    Specimen Description . Random Urine    Special Requests NOT REPORTED    Culture ESCHERICHIA COLI >[id number] CFU/MLAbnormal    Escherichia coli (1)    Antibiotic Interpretation RADHA Status    amikacin   Final     NOT REPORTED    ampicillin Resistant  Final     >=32   RESISTANT   ampicillin-sulbactam   Final     NOT REPORTED    aztreonam Sensitive  Final     <=1   SUSCEPTIBLE   ceFAZolin Sensitive  Final     <=4   SUSCEPTIBLE   ceFAZolin Sensitive Cefazolin sensitivity results can be used to predict the effectiveness of oral cephalosporins (eg.  Cephalexin) in uncomplicated Urinary Tract Infections due to E. coli, K. pneumoniae, and P. mirabilis Final    cefepime   Final     NOT REPORTED    cefTRIAXone Sensitive  Final     <=1   SUSCEPTIBLE   ciprofloxacin Sensitive  Final     1   SUSCEPTIBLE   ertapenem   Final     NOT REPORTED    Confirmatory Extended Spectrum Beta-Lactamase Negative NEGATIVE Final    gentamicin Intermediate  Final     8   INTERMEDIATE   meropenem   Final     NOT REPORTED    nitrofurantoin Sensitive  Final     <=16   SUSCEPTIBLE   tigecycline   Final     NOT REPORTED    tobramycin Intermediate  Final     8   INTERMEDIATE   trimethoprim-sulfamethoxazole Sensitive  Final     <=20   SUSCEPTIBLE   piperacillin-tazobactam Sensitive  Final     8   SUSCEPTIBLE             Medications:      metoprolol tartrate  25 mg Oral BID    acetaminophen  1,000 mg Oral 3 times per day    amiodarone  200 mg Oral Daily    warfarin  5 mg Oral Daily    warfarin (COUMADIN) daily

## 2021-03-12 NOTE — PROGRESS NOTES
[facility]abrese[facility] S/P colectomy [Z90.49] . Vitals:    03/12/21 0812   BP:    Pulse:    Resp:    Temp:    SpO2: 98%   . Patients home meds are   Prior to Admission medications    Medication Sig Start Date End Date Taking?  Authorizing Provider   Calcium Carbonate (CALCIUM 600 PO) Take 600 mg by mouth daily   Yes Historical Provider, MD   dextromethorphan-guaiFENesin (MUCINEX DM)  MG per extended release tablet Take 1 tablet by mouth 2 times daily   Yes Historical Provider, MD   famotidine (PEPCID) 20 MG tablet Take 20 mg by mouth daily as needed (heartburn)   Yes Historical Provider, MD   miconazole (MICOTIN) 2 % cream Apply topically 2 times daily as needed   Yes Historical Provider, MD   amiodarone (CORDARONE) 200 MG tablet Take 100 mg by mouth three times a week Takes 1/2 tab (=100mg) Monday, Wednesday, friday   Yes Historical Provider, MD   ascorbic acid (VITAMIN C) 1000 MG tablet Take 1,000 mg by mouth every evening    Yes Historical Provider, MD   torsemide (DEMADEX) 20 MG tablet Take 60 mg by mouth daily Takes 3 tabs (=60mg) daily   Yes Historical Provider, MD   acetaminophen (TYLENOL) 650 MG extended release tablet Take 650 mg by mouth every 8 hours as needed    Yes Historical Provider, MD   albuterol sulfate  (90 Base) MCG/ACT inhaler Inhale 2 puffs into the lungs every 4 hours as needed    Yes Historical Provider, MD   aspirin 325 MG tablet Take 325 mg by mouth every evening    Yes Historical Provider, MD   atorvastatin (LIPITOR) 20 MG tablet Take 10 mg by mouth every evening Takes 1/2 tab (=10mg) nightly 5/30/18  Yes Historical Provider, MD   Azelastine-Fluticasone 137-50 MCG/ACT SUSP 1 spray by Nasal route 2 times daily as needed (allergies)  4/16/18  Yes Historical Provider, MD   buPROPion (WELLBUTRIN SR) 150 MG extended release tablet Take 150 mg by mouth 2 times daily  7/30/18  Yes Historical Provider, MD   calcium carbonate-vitamin D (CALTRATE) 600-400 MG-UNIT TABS per tab Take 1 tablet by mouth daily    Yes Historical Provider, MD   cetirizine (ZYRTEC) 10 MG tablet Take 10 mg by mouth every evening  11/17/17  Yes Historical Provider, MD   vitamin D3 (CHOLECALCIFEROL) 125 MCG (5000 UT) TABS tablet Take 5,000 Units by mouth every evening    Yes Historical Provider, MD   citalopram (CELEXA) 20 MG tablet Take 10 mg by mouth daily Takes 1/2 tab (=10mg) daily 7/16/18  Yes Historical Provider, MD   fluticasone (FLONASE) 50 MCG/ACT nasal spray 1 spray by Nasal route 2 times daily as needed (if not using azelastine nasal spray)  7/12/17  Yes Historical Provider, MD   fluticasone-salmeterol (ADVAIR) 500-50 MCG/DOSE diskus inhaler Inhale 1 puff into the lungs 2 times daily    Yes Historical Provider, MD   ipratropium-albuterol (DUONEB) 0.5-2.5 (3) MG/3ML SOLN nebulizer solution Inhale 3 mLs into the lungs every 6 hours as needed    Yes Historical Provider, MD   LUTEIN PO Take 1 capsule by mouth daily    Yes Historical Provider, MD   Melatonin 10 MG TABS Take 10 mg by mouth nightly as needed (sleep)    Yes Historical Provider, MD   metoprolol tartrate (LOPRESSOR) 25 MG tablet Take 25 mg by mouth 2 times daily    Yes Historical Provider, MD   Multiple Vitamins-Minerals (MULTIVITAMIN ADULT PO) Take 1 tablet by mouth every evening    Yes Historical Provider, MD   nystatin ([address]) 416580 UNIT/GM powder Apply topically 3 times daily as needed (under breasts)  5/21/18  Yes Historical Provider, MD   Omega-3 Fatty Acids (FISH OIL PO) Take 1 g by mouth every evening    Yes Historical Provider, MD   pantoprazole (PROTONIX) 40 MG tablet Take 40 mg by mouth daily  10/5/18  Yes Historical Provider, MD   senna-docusate ([place]) 8.6-50 MG per tablet Take 1 tablet by mouth every evening    Yes Historical Provider, MD   spironolactone (ALDACTONE) 25 MG tablet Take 25 mg by mouth every morning  7/3/18  Yes Historical Provider, MD   solifenacin (VESICARE) 5 MG tablet Take 5 mg by mouth Score 1 2 3   Bilateral Breath Sounds   [x]  Occasional Rhonchi []  Scattered Rhonchi []  Course Rhonchi and/or poor aeration   Sputum    [x]  Small amount of thin secretions []  Moderate amount of viscous secretions []  Cop[place], Viscious Yellow/ Secretions   CXR as reported by physician []  clear  []  Unavailable [x]  Infiltrates and/or consolidation  []  Unavailable []  Mucus Plugging and or lobar consolidation  []  Unavailable   Cough [x]  Strong, productive cough []  Weak productive cough []  No cough or weak non-productive cough

## 2021-03-12 NOTE — PROGRESS NOTES
Occupational Therapy  Facility/Department: [facility] PROGRESSIVE CARE  Daily Treatment Note  NAME: Radha Marcelo  : 1945  MRN: [id number]    RN LUCINDA reports patient is medically stable for therapy treatment this date. Chart reviewed prior to treatment and patient is agreeable for therapy. All lines intact and patient positioned comfortably at end of treatment. All patient needs addressed prior to ending therapy session. Date of Service: 3/12/2021    Discharge Recommendations:  Subacute/Skilled Nursing Facility  OT Equipment Recommendations  ADL Assistive Devices: Reacher;Long-handled Sponge;Long-handled Shoe Horn;Emergency Alert System    Assessment   Performance deficits / Impairments: Decreased functional mobility ; Decreased ADL status; Decreased safe awareness;Decreased endurance;Decreased balance;Decreased strength;Decreased sensation;Decreased high-level IADLs  Assessment: Continue with OT to improve functional I and safety to DC home with assist as needed. Prognosis: Fair  OT Education: OT Role;Plan of Care;Energy Conservation;Transfer Training;ADL Adaptive Strategies  Patient Education: safety in function, pursed lip breathing, proper bed mob tech, recommendations for continued therapy  REQUIRES OT FOLLOW UP: Yes  Activity Tolerance  Activity Tolerance: Patient Tolerated treatment well;Patient limited by fatigue;Patient limited by pain  Activity Tolerance: P+; pt fatigues easily  Safety Devices  Safety Devices in place: Yes  Type of devices: Left in chair;Nurse notified;Call light within reach;Gait belt;Patient at risk for falls(BLE's elevated in recliner and pillow under)         Patient Diagnosis(es): There were no encounter diagnoses.       has a past medical history of Acute respiratory failure following trauma and surgery ([facility] [address] Adiposity, Arthralgia of multiple joints, Arthritis, Asthma, Asthma with acute exacerbation, Atopic rhinitis, CAD (coronary artery disease), CHF (congestive heart failure) (HCC), Chronic bilateral low back pain without sciatica, Closed fracture of proximal end of humerus, COPD (chronic obstructive pulmonary disease) ([facility] [address] Depression, Diverticulitis, Diverticulosis of large intestine, Diverticulosis of large intestine without hemorrhage, Encounter for mammogram to establish baseline mammogram, Essential hypertension, H/O hysterectomy for benign disease, Heart murmur, History of blood transfusion, History of mitral valve repair, Hyperlipidemia, Hypothermia due to anesthesia, Hypothermia due to anesthesia, Laceration of chest wall, Laceration of chest wall, Mitral incompetence, Morbid obesity ([facility] [address] Non-rheumatic mitral regurgitation, Polyp of sigmoid colon, Postmenopausal status, Routine general medical examination at a health care facility, Special screening for malignant neoplasms, colon, Special screening for malignant neoplasms, vagina, and Urinary incontinence. has a past surgical history that includes Appendectomy; Mitral valve repair; Hysterectomy; bronchoscopy; Cardiac catheterization (03/22/2017); Cardiac catheterization (11/18*/2016); Wound Exploration; other surgical history; Ovary removal; eye surgery (Bilateral); Colonoscopy; Endoscopy, colon, diagnostic; fracture surgery; and Small intestine surgery (N/A, 2/25/2021). Restrictions  Restrictions/Precautions  Restrictions/Precautions: General Precautions, Surgical Protocols, Fall Risk, Up as Tolerated  Required Braces or Orthoses?: No  Required Braces or Orthoses  Other: Abdominal Binder  Position Activity Restriction  Other position/activity restrictions:  Up with assist, abd sx/incision, colostomy, IV, garrido, O2 per NC, EDWAR diet, 1800 ml fluid restriction  Subjective   General  Chart Reviewed: Yes  Patient assessed for rehabilitation services?: Yes  Response to previous treatment: Patient with no complaints from previous session  Family / Caregiver Present: No  Pre Treatment Pain Screening  Intervention List: Nurse/Physician notified  Comments / Details: Pt states she has pain in her abd incision and rates 2/10; pt states she took Tylenol prior to writer arrival.  Vital Signs  Patient Currently in Pain: Yes   Orientation  Orientation  Overall Orientation Status: Within Functional Limits  Objective    ADL  Grooming: Setup;Supervision(for hair care and applying deodorant seated in recliner)  UE Bathing: Setup;Minimal assistance(for sponge bath using medicated soap and pt was educated to not use on face or private parts with good understanding.)  LE Bathing: Setup;Maximum assistance(Assist with washing BLE's/posterior kristina area; pt was educated on recommendations for long handled bathing sponge at DC to increase overall ease and I and pt in agreement  Pt stood with RW and was able to wash front kristina area with 1 UE support on RW and CG for balance.)  UE Dressing: Setup;Minimal assistance  LE Dressing: Unable to assess(pt declined to change B socks)  Toileting: Minimal assistance(with gown mgt; pt urinated and required CG for balance to stand and complete front hygiene with 1 UE support on RW)  Additional Comments: Pt was edu to pace self, rest/take breaks as needed, sit vs stand, pursed lip breathing with self care for EC/WS tech. Pt with good understanding. Balance  Sitting Balance: Supervision  Standing Balance: Contact guard assistance(with RW)  Standing Balance  Time: stand cayetano 1-1.5 mins with RW for functional tasks; pt fatigues easily  Functional Mobility  Functional - Mobility Device: Rolling Walker  Activity: (bed to toilet, toilet to recliner with increased time needed)  Assist Level: Contact guard assistance(SBA-CGA)  Functional Mobility Comments: MIN cues needed for upright posture, scanning, pursed lip breathing, and awareness/assist with lines to increase overall safety/reduce falls.   Toilet Transfers  Toilet - Technique: Ambulating  Equipment Used: Grab bars  Toilet Transfer: Contact guard assistance Toilet Transfers Comments: MIN cues needed for upright posture, hand placement on grab bar, scanning, nose over toes as able, pursed lip breathing and awareness/assist with all lines to increase overall safety. Pt with good hand placement. Bed mobility  Supine to Sit: Minimal assistance(with increased time needed)  Sit to Supine: Unable to assess  Comment: MOD cues needed for pursed lip breathing, hand placement on be rail and proper log rolling tech. Transfers  Stand Step Transfers: Contact guard assistance(with RW)  Sit to stand: Contact guard assistance(with increased time)  Stand to sit: Contact guard assistance  Transfer Comments: MIN cues needed for upright posture, scanning, pursed lip breathing and awareness/assist with all lines to increase overall safety. Pt with good hand placement this date. Cognition  Overall Cognitive Status: Exceptions  Arousal/Alertness: Appropriate responses to stimuli  Following Commands:  Follows multistep commands with increased time  Attention Span: Appears intact  Memory: Decreased short term memory  Safety Judgement: Decreased awareness of need for safety;Decreased awareness of need for assistance  Problem Solving: Assistance required to correct errors made;Assistance required to identify errors made;Decreased awareness of errors;Assistance required to generate solutions;Assistance required to implement solutions  Insights: Decreased awareness of deficits  Initiation: Requires cues for some  Sequencing: Requires cues for some                                         Plan   Plan  Times per week: 4-5x per week, 1-2x per day as cayetano  Times per day: Daily  Current Treatment Recommendations: Strengthening, Balance Training, Safety Education & Training, Self-Care / ADL, Equipment Evaluation, Education, & procurement, Endurance Training, Patient/Caregiver Education & Training, Home Management Training, Functional Mobility Training, Pain Management AM-PAC Score   17       Goals  Short term goals  Time Frame for Short term goals: by discharge, pt to demo  Short term goal 1: S/MI for bed mob tasks with use of bed rail as needed  Short term goal 2: S/MI for UB ADLs and SBA for LB ADLs with AE as needed and Good safety  Short term goal 3: S/MI for ADL transfers and functional mob with least restrictive mob device and Good safety  Short term goal 4: Pt/family to be IND with EC/WS, precautions, and fall prevention tech as well as DME/AE recommendations with use of handouts  Patient Goals   Patient goals : Pt states she wants to go to rehab!        Therapy Time   Individual Concurrent Group Co-treatment   Time In 1005         Time Out 1043         Minutes [address], [place]

## 2021-03-12 NOTE — PLAN OF CARE
Nutrition Problem #1: Mild malnutrition, In context of acute illness or injury  Intervention: Food and/or Nutrient Delivery: Continue Current Diet, Continue Oral Nutrition Supplement  Nutritional Goals: PO intakes will be greater than 75% at meals

## 2021-03-15 ENCOUNTER — HOSPITAL ENCOUNTER (OUTPATIENT)
Age: 76
Setting detail: SPECIMEN
Discharge: HOME | End: 2021-03-15
Payer: MEDICARE

## 2021-03-15 LAB
ALBUMIN (CALCULATED): 2.7 G/DL (ref 3.2–5.2)
ALBUMIN PERCENT: 42 % (ref 45–65)
ALPHA 1 PERCENT: 7 % (ref 3–6)
ALPHA 2 PERCENT: 20 % (ref 6–13)
ALPHA-1-GLOBULIN: 0.5 G/DL (ref 0.1–0.4)
ALPHA-2-GLOBULIN: 1.3 G/DL (ref 0.5–0.9)
ANION GAP SERPL CALCULATED.3IONS-SCNC: 19 MMOL/L (ref 9–17)
BETA GLOBULIN: 1 G/DL (ref 0.5–1.1)
BETA PERCENT: 15 % (ref 11–19)
BUN BLDV-MCNC: 24 MG/DL (ref 8–23)
BUN/CREAT BLD: 19 (ref 9–20)
CALCIUM SERPL-MCNC: 8.8 MG/DL (ref 8.6–10.4)
CHLORIDE BLD-SCNC: 97 MMOL/L (ref 98–107)
CO2: 21 MMOL/L (ref 20–31)
CREAT SERPL-MCNC: 1.28 MG/DL (ref 0.5–0.9)
GAMMA GLOBULIN %: 16 % (ref 9–20)
GAMMA GLOBULIN: 1 G/DL (ref 0.5–1.5)
GFR AFRICAN AMERICAN: 49 ML/MIN
GFR NON-AFRICAN AMERICAN: 41 ML/MIN
GFR SERPL CREATININE-BSD FRML MDRD: ABNORMAL ML/MIN/{1.73_M2}
GFR SERPL CREATININE-BSD FRML MDRD: ABNORMAL ML/MIN/{1.73_M2}
GLUCOSE BLD-MCNC: 132 MG/DL (ref 70–99)
HCT VFR BLD CALC: 32.5 % (ref 36.3–47.1)
HEMOGLOBIN: 9.6 G/DL (ref 11.9–15.1)
INR BLD: 2
MCH RBC QN AUTO: 27.9 PG (ref 25.2–33.5)
MCHC RBC AUTO-ENTMCNC: 29.5 G/DL (ref 28.4–34.8)
MCV RBC AUTO: 94.5 FL (ref 82.6–102.9)
NRBC AUTOMATED: 0 PER 100 WBC
PATHOLOGIST: ABNORMAL
PDW BLD-RTO: 15.8 % (ref 11.8–14.4)
PLATELET # BLD: 566 K/UL (ref 138–453)
PMV BLD AUTO: 10.6 FL (ref 8.1–13.5)
POTASSIUM SERPL-SCNC: 4.3 MMOL/L (ref 3.7–5.3)
PROTEIN ELECTROPHORESIS, SERUM: ABNORMAL
PROTHROMBIN TIME: 22.4 SEC (ref 11.5–14.2)
RBC # BLD: 3.44 M/UL (ref 3.95–5.11)
SODIUM BLD-SCNC: 137 MMOL/L (ref 135–144)
TOTAL PROT. SUM,%: 100 % (ref 98–102)
TOTAL PROT. SUM: 6.5 G/DL (ref 6.3–8.2)
TOTAL PROTEIN: 6.5 G/DL (ref 6.4–8.3)
WBC # BLD: 17.7 K/UL (ref 3.5–11.3)

## 2021-03-15 PROCEDURE — 85610 PROTHROMBIN TIME: CPT

## 2021-03-15 PROCEDURE — P9603 ONE-WAY ALLOW PRORATED MILES: HCPCS

## 2021-03-15 PROCEDURE — 80048 BASIC METABOLIC PNL TOTAL CA: CPT

## 2021-03-15 PROCEDURE — 36415 COLL VENOUS BLD VENIPUNCTURE: CPT

## 2021-03-15 PROCEDURE — 85027 COMPLETE CBC AUTOMATED: CPT

## 2021-03-16 ENCOUNTER — HOSPITAL ENCOUNTER (OUTPATIENT)
Age: 76
Setting detail: SPECIMEN
Discharge: HOME | End: 2021-03-16

## 2021-03-16 LAB
BILIRUBIN URINE: NEGATIVE
COLOR: YELLOW
COMMENT UA: NORMAL
CULTURE: NORMAL
GLUCOSE URINE: NEGATIVE
KETONES, URINE: NEGATIVE
LEUKOCYTE ESTERASE, URINE: NEGATIVE
Lab: NORMAL
NITRITE, URINE: NEGATIVE
PH UA: 5 (ref 5–8)
PROTEIN UA: NEGATIVE
SPECIFIC GRAVITY UA: 1.02 (ref 1–1.03)
SPECIMEN DESCRIPTION: NORMAL
TURBIDITY: CLEAR
URINE HGB: NEGATIVE
UROBILINOGEN, URINE: NORMAL

## 2021-03-16 PROCEDURE — 81003 URINALYSIS AUTO W/O SCOPE: CPT

## 2021-03-16 PROCEDURE — 87086 URINE CULTURE/COLONY COUNT: CPT

## 2021-03-17 ENCOUNTER — HOSPITAL ENCOUNTER (OUTPATIENT)
Age: 76
Setting detail: SPECIMEN
Discharge: HOME | End: 2021-03-17
Payer: MEDICARE

## 2021-03-17 LAB
CULTURE: NORMAL
INR BLD: 1.9
Lab: NORMAL
PROTHROMBIN TIME: 21.1 SEC (ref 11.5–14.2)
SPECIMEN DESCRIPTION: NORMAL

## 2021-03-17 PROCEDURE — 36415 COLL VENOUS BLD VENIPUNCTURE: CPT

## 2021-03-17 PROCEDURE — P9603 ONE-WAY ALLOW PRORATED MILES: HCPCS

## 2021-03-17 PROCEDURE — 85610 PROTHROMBIN TIME: CPT

## 2021-03-19 ENCOUNTER — HOSPITAL ENCOUNTER (OUTPATIENT)
Age: 76
Setting detail: SPECIMEN
Discharge: HOME | End: 2021-03-19
Payer: MEDICARE

## 2021-03-19 LAB
ANION GAP SERPL CALCULATED.3IONS-SCNC: 14 MMOL/L (ref 9–17)
BUN BLDV-MCNC: 26 MG/DL (ref 8–23)
BUN/CREAT BLD: 22 (ref 9–20)
CALCIUM SERPL-MCNC: 9.3 MG/DL (ref 8.6–10.4)
CHLORIDE BLD-SCNC: 100 MMOL/L (ref 98–107)
CO2: 25 MMOL/L (ref 20–31)
CREAT SERPL-MCNC: 1.17 MG/DL (ref 0.5–0.9)
GFR AFRICAN AMERICAN: 55 ML/MIN
GFR NON-AFRICAN AMERICAN: 45 ML/MIN
GFR SERPL CREATININE-BSD FRML MDRD: ABNORMAL ML/MIN/{1.73_M2}
GFR SERPL CREATININE-BSD FRML MDRD: ABNORMAL ML/MIN/{1.73_M2}
GLUCOSE BLD-MCNC: 111 MG/DL (ref 70–99)
HCT VFR BLD CALC: 31.6 % (ref 36.3–47.1)
HEMOGLOBIN: 9 G/DL (ref 11.9–15.1)
INR BLD: 1.7
MCH RBC QN AUTO: 27.7 PG (ref 25.2–33.5)
MCHC RBC AUTO-ENTMCNC: 28.5 G/DL (ref 28.4–34.8)
MCV RBC AUTO: 97.2 FL (ref 82.6–102.9)
NRBC AUTOMATED: 0 PER 100 WBC
PDW BLD-RTO: 16.1 % (ref 11.8–14.4)
PLATELET # BLD: 593 K/UL (ref 138–453)
PMV BLD AUTO: 10 FL (ref 8.1–13.5)
POTASSIUM SERPL-SCNC: 4 MMOL/L (ref 3.7–5.3)
PROTHROMBIN TIME: 19.6 SEC (ref 11.5–14.2)
RBC # BLD: 3.25 M/UL (ref 3.95–5.11)
SODIUM BLD-SCNC: 139 MMOL/L (ref 135–144)
WBC # BLD: 17.9 K/UL (ref 3.5–11.3)

## 2021-03-19 PROCEDURE — 36415 COLL VENOUS BLD VENIPUNCTURE: CPT

## 2021-03-19 PROCEDURE — 85027 COMPLETE CBC AUTOMATED: CPT

## 2021-03-19 PROCEDURE — P9603 ONE-WAY ALLOW PRORATED MILES: HCPCS

## 2021-03-19 PROCEDURE — 80048 BASIC METABOLIC PNL TOTAL CA: CPT

## 2021-03-19 PROCEDURE — 87040 BLOOD CULTURE FOR BACTERIA: CPT

## 2021-03-19 PROCEDURE — 85610 PROTHROMBIN TIME: CPT

## 2021-03-20 NOTE — PROGRESS NOTES
Physician Progress Note      PATIENT:               Rochelle Hoover  CSN #:                  [id number]  :                       1945  ADMIT DATE:       2021 5:56 AM  DISCH DATE:        3/12/2021 4:18 PM  RESPONDING  PROVIDER #:        Albert Root DO          QUERY TEXT:    Pt admitted with diverticular disease, colovesical fistula s/p resection. Noted documentation of mild malnutrition on 3/2 in Clinical Dietician consult   note. If possible, please document in progress notes and discharge summary:    The medical record reflects the following:  Risk Factors: Diverticulosis, colovesicle fistula, A/C CHF, A/C resp failure,   SHARYN on CKD, ABLA, post op ileus. Clinical Indicators: Per Dietician Mild malnutrition, In context of acute   illness or injury related to altered GI function as evidenced by NPO or clear   liquid status due to medical condition, mild loss of subcutaneous fat, GI   abnormality, N,V, constipation  Treatment: Clear liquids, monitor I&O. Thank you, Cachorro Almeida RN [facility] [phone]  Options provided:  -- Mild malnutrition confirmed  -- Mild malnutrition ruled out  -- Other - I will add my own diagnosis  -- Disagree - Not applicable / Not valid  -- Disagree - Clinically unable to determine / Unknown  -- Refer to Clinical Documentation Reviewer    PROVIDER RESPONSE TEXT:    The diagnosis of mild malnutrition was confirmed.     Query created by: Nguyen Gray on 3/4/2021 12:30 PM      Electronically signed by:  Albert Root DO 3/20/2021 1:41 PM

## 2021-03-21 NOTE — DISCHARGE SUMMARY
Discharge Summary     Patient Identification  PATIENT NAME: Chris Cedillo  YOB: 1945  MEDICAL RECORD NO. [id number]  ADMIT DATE: 2/25/2021  DISCHARGE DATE: 3/12/2021  4:18 PM                                   Disposition: [facility]    Discharge Diagnoses:   Patient Active Problem List   Diagnosis    [facility]congestive heart failure) ([facility]    Essential hypertension    Hyperlipidemia    Morbid obesity ([facility]    Urinary incontinence    s/p open rectosigmoidectomy with end colostomy    SIRS (systemic inflammatory response syndrome) ([facility]    Colovesical fistula    Atrial fibrillation (HCC)    Urinary tract infection    Anemia    Acute renal failure (ARF) (HCC)    Recurrent UTI    Acute respiratory failure with hypoxia ([facility]    Acute on chronic congestive heart failure ([facility]       Discharge Condition: Improved    Consultants: ID, Cardiology, IM, Pulmonology     Surgery: Flex sigmoidoscopy, exploratory laparotomy, rectosigmoid resection for colovesical fistula, end colostomy creation, mobilization of splenic flexure and lysis of adhesions.         Relevant Diagnostic Imaging:    Echocardiogram 2d W M-mode  Result Date: 3/9/2021  [facility] Transthoracic Echocardiography Report (TTE)  Patient Name Pippa Padilla   Date of Study               03/09/2021               ORLIN   Date of      1945  Gender                      Female  Birth   Age          67 year(s)  Race                        Unknown   Room Number  1008        Height:                     62 inch, 157.48 cm   Corporate ID [id number]    Weight:                     286 pounds, 129.7 kg  #   Patient Acct [de-identified]   BSA:          2.23 m^2      BMI:      52.31  #                                                              kg/m^2   MR #         [id number]     Sonographer                 Olimpia العراقي   Accession #  [id number]  Interpreting Physician      Lizbeth Cole   Fellow                   Referring Nurse Practitioner   Interpreting             Referring Physician         Julisa Wakefield  Type of Study   TTE procedure:2D Echocardiogram, M-Mode, Doppler, Color Doppler. Procedure Date Date: 03/09/2021 Start: 02:28 PM Study Location: [address] [place] [address] Technical Quality: Adequate visualization Indications:Arrhythmia and Congestive heart failure. History / Tech. Comments: Procedure explained to patient. Patient Status: Inpatient Height: 62 inches Weight: 286.01 pounds BSA: 2.23 m^2 BMI: 52.31 kg/m^2 Rhythm: Sinus arrhythmia HR: 94 bpm CONCLUSIONS Summary Left ventricle is normal in size. Mild left ventricular hypertrophy. Global left ventricular systolic function is normal with an estimated ejection fraction of 60 % . No obvious wall motion abnormality seen. Mitral annular calcification is seen with thickened leaflets. Mild mitral regurgitation. No significant pericardial effusion is seen. Signature ----------------------------------------------------------------------------  Electronically signed by Olimpia العراقي(Sonographer) on 03/09/2021 03:08  PM ---------------------------------------------------------------------------- ----------------------------------------------------------------------------  Electronically signed by Mohsen Thompson(Interpreting physician) on  03/09/2021 04:04 PM ---------------------------------------------------------------------------- FINDINGS Left Atrium Left atrium is normal in size. Left Ventricle Left ventricle is normal in size. Mild left ventricular hypertrophy. Global left ventricular systolic function is normal with an estimated ejection fraction of 60 % . No obvious wall motion abnormality seen. Right Atrium Right atrium is normal in size. Right Ventricle Normal right ventricular size and function. Mitral Valve Mitral annular calcification is seen with thickened leaflets. Mild mitral regurgitation.  Aortic Valve Normal aortic valve structure and function without stenosis or regurgitation. Tricuspid Valve Normal tricuspid valve leaflets. Trivial tricuspid regurgitation. Pulmonic Valve The pulmonic valve is normal in structure. Pericardial Effusion No significant pericardial effusion is seen. Pleural Effusion No pleural effusion seen. Miscellaneous Normal aortic root dimension. M-mode / 2D Measurements & Calculations:   LVIDd:5.1 cm(3.7 - 5.6 cm)       Diastolic PEJEOD:558 ml  IGCFZ:1.2 cm(2.2 - 4.0 cm)       Systolic RCECHN:13.3 ml  FBJY:9.7 cm(0.6 - 1.1 cm)        Aortic Root:2.5 cm(2.0 - 3.7 cm)  LVPWd:1.2 cm(0.6 - 1.1 cm)       LA Dimension: 5.2 cm(1.9 - 4.0 cm)  Fractional Shortenin.22 %    LA volume/Index: 41.6 ml /19m^2  Calculated LVEF (%): 77.59 %   Mitral:                                   Aortic   Valve Area (P1/2-Time): 3.28 cm^2         Peak Velocity: 2.18 m/s  Peak E-Wave: 1.72 m/s                     Mean Velocity: 1.28 m/s  Peak A-Wave: 0.86 m/s                     Peak Gradient: 19.01 mmHg  E/A Ratio: 2                              Mean Gradient: 8 mmHg  Peak Gradient: 11.83 mmHg  Deceleration Time: 222 msec  P1/2t: 67 msec                            AV VTI: 39.4 cm  Septal Wall E' velocity:0.11 m/s Lateral Wall E' velocity:0.11 m/s    Xr Foot Right (min 3 Views)    Result Date: 3/9/2021  EXAMINATION: THREE XRAY VIEWS OF THE RIGHT FOOT 3/9/2021 10:38 am COMPARISON: None. HISTORY: ORDERING SYSTEM PROVIDED HISTORY: Pain/inability to bear weight TECHNOLOGIST PROVIDED HISTORY: Pain/inability to bear weight Reason for Exam: Patient states right foot pain, inability to bear weight Acuity: Unknown Type of Exam: Unknown FINDINGS: The visualized bones are normal. There is no evidence of fracture or dislocation. Mild hallux valgus deformity of the 1st digit. The  joint spaces appear well maintained. The soft tissues are unremarkable.   Prominent plantar calcaneal spur     No acute bony abnormalities are noted Calcaneal spur     Xr Abdomen (kub) (single Ap View) Result Date: 3/6/2021  EXAMINATION: ONE SUPINE XRAY VIEW(S) OF THE ABDOMEN 3/6/2021 10:36 am COMPARISON: 03/05/2021 HISTORY: ORDERING SYSTEM PROVIDED HISTORY: follow up on ileus TECHNOLOGIST PROVIDED HISTORY: follow up on ileus Acuity: Acute Type of Exam: Ongoing FINDINGS: NG coiled in the stomach. Decreased distention of small bowel loops throughout the abdomen. No extraluminal gas. Colonic gas identified. Surgical staples seen in the midline. Improved appearance demonstrating decreased distention of small bowel loops     Xr Abdomen (kub) (single Ap View)    Result Date: 3/5/2021  EXAMINATION: ONE SUPINE XRAY VIEW(S) OF THE ABDOMEN 3/5/2021 6:32 am COMPARISON: 02/28/2021 HISTORY: ORDERING SYSTEM PROVIDED HISTORY: Evaluate for obstruction TECHNOLOGIST PROVIDED HISTORY: Evaluate for obstruction Reason for Exam: Evaluate for Obstruction. Acuity: Acute Type of Exam: Initial FINDINGS: Multiple dilated gas-filled small bowel loops throughout the abdomen with more gas-filled dilated loops in compared to prior. Scattered areas of gas in the colon. No appreciable gas in the rectum. Ileus versus obstruction. Midline skin staples. Bones grossly intact. Multiple dilated gas-filled small bowel loops with scattered areas of gas in the colon. There are more dilated loops appreciated compared to prior. Differential remains ileus versus obstruction although the progression is more worrisome for obstruction. Xr Abdomen (kub) (single Ap View)    Result Date: 2/28/2021  EXAMINATION: ONE SUPINE XRAY VIEW(S) OF THE ABDOMEN 2/28/2021 11:21 am COMPARISON: None. HISTORY: ORDERING SYSTEM PROVIDED HISTORY: s/p colectomy TECHNOLOGIST PROVIDED HISTORY: s/p colectomy Reason for Exam: S/p colectomy, AP SUPINE Acuity: Acute Type of Exam: Initial FINDINGS: There are dilated air-filled loops of small bowel throughout the mid abdomen. There is no intraperitoneal free air. There are no suspicious calcifications.   There is no inflation. Small effusions and dependent atelectasis. Soft Tissues/Bones: No acute bone or soft tissue abnormality. ABDOMEN PELVIS: Organs: The liver, gallbladder, pancreas, spleen, adrenals and kidneys reveal no acute findings. GI/Bowel: Left-sided colostomy. No bowel dilatation or wall thickening identified. Oral contrast is present in the small and large bowel to the level of the ostomy Pelvis: Small amount of dependent fluid with rim enhancement in the pelvis. Ornelas catheter within the bladder. Small amount of intraluminal gas consistent with catheter placement. Peritoneum/Retroperitoneum: No free air. The aorta is normal in caliber. The visceral branches are patent. Few scattered periaortic subcentimeter lymph nodes. Bones/Soft Tissues: Postoperative findings status post midline abdominal incision in colostomy creation. Surgical clips in the left groin. Osteopenia. Probable old compression deformity of L1.     1.  No evidence for acute pulmonary embolism. 2.  Shallow inflation with dependent subsegmental atelectasis and small effusions. 3.  Postoperative findings status post recent colon resection and colostomy. Small amount of dependent fluid in the pelvis with rim enhancement, which may be postoperative. No loculated fluid collection identified. Xr Chest Portable    Result Date: 3/10/2021  EXAMINATION: ONE XRAY VIEW OF THE CHEST 3/10/2021 8:13 am COMPARISON: Chest radiograph performed 03/09/2021. HISTORY: ORDERING SYSTEM PROVIDED HISTORY: Effusion/infiltrate TECHNOLOGIST PROVIDED HISTORY: Effusion/infiltrate Reason for Exam: Effusion/infiltrate, AP UPRIGHT PORTABLE Acuity: Acute Type of Exam: Subsequent/Follow-up FINDINGS: There are low lung volumes. There is chronic pulmonary change. There is bibasilar atelectasis. There is no pneumothorax. The heart is prominent. The upper abdomen is unremarkable. The extrathoracic soft tissues are unremarkable.   There is a right-sided PICC line with the tip in the mid SVC. Mild cardiomegaly and chronic pulmonary change with presumed bibasilar atelectasis. Xr Chest Portable    Result Date: 3/5/2021  EXAMINATION: ONE XRAY VIEW OF THE CHEST 3/5/2021 12:35 pm COMPARISON: None. HISTORY: ORDERING SYSTEM PROVIDED HISTORY: verify ng placement TECHNOLOGIST PROVIDED HISTORY: verify ng placement Reason for Exam: NG tube placement Acuity: Acute Type of Exam: Initial FINDINGS: There is chronic pulmonary change. There is a stable elevated right hemidiaphragm. There is bibasilar atelectasis. There is no pneumothorax. The heart is enlarged. The upper abdomen is unremarkable. The extrathoracic soft tissues are unremarkable. There is a gastric tube with the tip in the proximal mid gastric body. Chronic pulmonary change with bibasilar atelectasis and stable elevated right hemidiaphragm. Gastric tube with the tip in the proximal mid gastric body. Xr Chest Portable    Result Date: 3/4/2021  EXAMINATION: ONE XRAY VIEW OF THE CHEST 3/4/2021 6:32 am COMPARISON: 02/28/2021. HISTORY: ORDERING SYSTEM PROVIDED HISTORY: CHF TECHNOLOGIST PROVIDED HISTORY: CHF Reason for Exam: SOB   CHF Initial evaluation. FINDINGS: There is elevation of the right hemidiaphragm. The cardiac silhouette is enlarged. Prominence of the pulmonary vasculature bilaterally. Minimal bibasilar opacification appears unchanged. No large pleural effusion. No pneumothorax. 1. Enlarged cardiac silhouette with prominence of the pulmonary vasculature. 2. Minimal bibasilar opacification, which is unchanged. 3. No large pleural effusions. Xr Chest Portable    Result Date: 2/27/2021  EXAMINATION: ONE XRAY VIEW OF THE CHEST 2/27/2021 6:13 am COMPARISON: Chest radiograph performed 02/26/2021. HISTORY: ORDERING SYSTEM PROVIDED HISTORY: Pulmonary edema TECHNOLOGIST PROVIDED HISTORY: Pulmonary edema Reason for Exam: pulm edema Acuity: Unknown Type of Exam: Unknown FINDINGS: There are low lung volumes. There are scattered pulmonary infiltrates. There are small bilateral effusions. There is no pneumothorax. The mediastinal structures are unremarkable. The upper abdomen is unremarkable. The extrathoracic soft tissues are unremarkable. Small bilateral effusions with scattered pulmonary infiltrates concerning for edema versus pneumonia. Xr Chest Portable    Result Date: 2/26/2021  EXAMINATION: ONE XRAY VIEW OF THE CHEST 2/26/2021 5:09 am COMPARISON: None. HISTORY: ORDERING SYSTEM PROVIDED HISTORY: post op TECHNOLOGIST PROVIDED HISTORY: post op Reason for Exam: post op SOB FINDINGS: There is elevation of the right hemidiaphragm. No focal consolidation, pleural effusion, or pneumothorax. There is prominence of the pulmonary vasculature. Cardiac silhouette is enlarged. No acute osseous abnormality. 1. Cardiomegaly with prominence of the pulmonary vasculature. 2. Elevation of the right hemidiaphragm. Xr Chest 1 View    Result Date: 3/9/2021  EXAMINATION: ONE XRAY VIEW OF THE CHEST 3/9/2021 4:53 am COMPARISON: March 7, 2021, chest examination HISTORY: ORDERING SYSTEM PROVIDED HISTORY: chf TECHNOLOGIST PROVIDED HISTORY: chf Reason for Exam: Port upright AP CXR - chf Acuity: Unknown Type of Exam: Unknown FINDINGS: Interval removal of nasogastric tube. Stable right PICC line catheter. Stable cardiopericardial silhouette. Prosthetic heart valve Persistent moderate asymmetric elevation of the right hemidiaphragm Persistent mild prominence/indistinctness of pulmonary vascularity/interstitial markings with mild streaky bibasilar density.  Minimal bilateral pleural effusions     Stable line placement Stable mild vascular congestion with minimal bilateral pleural effusions/mild streaky bibasilar atelectasis     Xr Chest 1 View    Result Date: 3/7/2021  EXAMINATION: ONE XRAY VIEW OF THE CHEST 3/7/2021 6:12 pm COMPARISON: 03/05/2021 HISTORY: ORDERING SYSTEM PROVIDED HISTORY: chf TECHNOLOGIST PROVIDED HISTORY: mA/kV was utilized to reduce the radiation dose to as low as reasonably achievable. COMPARISON: None. HISTORY: ORDERING SYSTEM PROVIDED HISTORY: Sepsis TECHNOLOGIST PROVIDED HISTORY: Sepsis Reason for Exam: Sob, pain, sepsis, s/p small intestine surgery for diverticulitis 2-25-21 Acuity: Acute Type of Exam: Initial; ORDERING SYSTEM PROVIDED HISTORY: Sepsis TECHNOLOGIST PROVIDED HISTORY: CT abdomen/pelvis w/ IV and oral contrast. Sepsis Reason for Exam: Sob, pain, sepsis, s/p small intestine surgery for diverticulitis 2-25-21 Acuity: Acute Type of Exam: Initial FINDINGS: CHEST CT: Pulmonary Arteries: Pulmonary arteries are adequately opacified for evaluation. No evidence of intraluminal filling defect to suggest pulmonary embolism. Main pulmonary artery is normal in caliber. Mediastinum: No evidence of mediastinal lymphadenopathy. The heart and pericardium demonstrate no acute abnormality. There is no acute abnormality of the thoracic aorta. Lungs/pleura: Shallow inflation. Small effusions and dependent atelectasis. Soft Tissues/Bones: No acute bone or soft tissue abnormality. ABDOMEN PELVIS: Organs: The liver, gallbladder, pancreas, spleen, adrenals and kidneys reveal no acute findings. GI/Bowel: Left-sided colostomy. No bowel dilatation or wall thickening identified. Oral contrast is present in the small and large bowel to the level of the ostomy Pelvis: Small amount of dependent fluid with rim enhancement in the pelvis. Ornelas catheter within the bladder. Small amount of intraluminal gas consistent with catheter placement. Peritoneum/Retroperitoneum: No free air. The aorta is normal in caliber. The visceral branches are patent. Few scattered periaortic subcentimeter lymph nodes. Bones/Soft Tissues: Postoperative findings status post midline abdominal incision in colostomy creation. Surgical clips in the left groin. Osteopenia.   Probable old compression deformity of L1.     1.  No evidence for acute pulmonary embolism. 2.  Shallow inflation with dependent subsegmental atelectasis and small effusions. 3.  Postoperative findings status post recent colon resection and colostomy. Small amount of dependent fluid in the pelvis with rim enhancement, which may be postoperative. No loculated fluid collection identified. Ir Picc Wo Sq Port/pump > 5 Years    Result Date: 3/5/2021  PROCEDURE: ULTRASOUND GUIDED VASCULAR ACCESS. FLUOROSCOPY GUIDED PICC PLACEMENT 3/5/2021. HISTORY: ORDERING SYSTEM PROVIDED HISTORY: TPN TECHNOLOGIST PROVIDED HISTORY: TPN How many lumens are being requested?->2 What site is the preferred site?->Brachial What side should this line be placed? ->Either Status post abdominal exploration with bowel resection for colovesical fistula/lysis of adhesions. In need of continued long-term IV fluids/TPN. SEDATION: Local anesthesia FLUOROSCOPY DOSE AND TYPE OR TIME AND EXPOSURES: Fluoroscopy time-0.2 minutes D AP-853 mGy cm squared TECHNIQUE: Procedure was performed by the nurse in special procedures under my indirect supervision. Informed consent was obtained after a detailed explanation of the procedure including risks, benefits, and alternatives. Universal protocol was observed. The right arm was prepped and draped in sterile fashion using maximum sterile barrier technique. Local anesthesia was achieved with lidocaine. A micropuncture needle was used to access the right basilic vein using ultrasound guidance. An ultrasound image demonstrating patency of the vein with needle tip located within it. An image was obtained and stored in PACs. A 0.018 guidewire was used to place a peel-a-way sheath and a 42 cm 5 Vietnamese dual-lumen power PICC was advanced with fluoroscopic guidance with the tip at the cavo-atrial junction. The catheter flushed easily and there was a good blood return. The catheter was secured to the skin.   The patient tolerated the procedure well and there were no immediate complications. FINDINGS: Fluoroscopic image demonstrates the tip of the catheter in the upper right atrium near the cavo-atrial junction. Elevated right hemidiaphragm, enteric tube in good position, prosthetic cardiac valve and cardiomegaly also noted. Successful ultrasound and fluoroscopy guided PICC placement, as above. PICC is ready for use at this time. Vl Lower Extremity Bilateral Venous Duplex    Result Date: 2/28/2021    [facility]  Vascular Lower Extremities DVT Study Procedure   Patient Name   Reddy Richardson   Date of Study           02/27/2021                 ORLIN   Date of Birth  1945  Gender                  Female   Age            67 year(s)  Race                    Unknown   Room Number    2036   Corporate ID # [id number]   Patient Acct # [de-identified]   MR #           [id number]     Yimi Palacios   Accession #    [id number]  Interpreting Physician  Tawny Skelton   Referring                  Referring Physician     James Garcia,  Nurse  Practitioner  Procedure Type of Study:   Veins: Lower Extremities DVT Study, Venous Scan Lower Bilateral.  Indications for Study:R/O DVT. Patient Status: In Patient. Technical Quality:Limited visualization. Limitation reason:Patient body habitus and edema. Conclusions   Summary   No evidence of superficial or deep venous thrombosis in both lower  extremities.    Signature   ----------------------------------------------------------------  Electronically signed by Aury Mederos(Sonographer) on  02/27/2021 02:03 PM  ----------------------------------------------------------------   ----------------------------------------------------------------  Electronically signed by Kellie Andrew Reyes,Arthur(Interpreting  physician) on 02/28/2021 08:36 PM  ----------------------------------------------------------------  Findings:   Right Impression:                     Left Impression:  Non-visualization of the distal       Non-visualization of the distal  femoral and peroneal veins due to     femoral veins due to edema and  edema and patient body habitus. patient body habitus. Remaining  Remaining visualized deep venous      visualized deep venous segments are  segments are compressible with        compressible with spontaneous phasic  spontaneous phasic spectral Doppler   spectral Doppler signals. signals. Superficial veins are        Superficial veins are compressible. compressible. Velocities are measured in cm/s ; Diameters are measured in cm Right Lower Extremities DVT Study Measurements Right 2D Measurements +------------------------------------+----------+---------------+----------+ ! Location                            ! Visualized! Compressibility! Thrombosis! +------------------------------------+----------+---------------+----------+ ! Common Femoral                      !Yes       ! Yes            ! None      ! +------------------------------------+----------+---------------+----------+ ! Prox Femoral                        !Yes       ! Yes            ! None      ! +------------------------------------+----------+---------------+----------+ ! Mid Femoral                         !Yes       ! Yes            ! None      ! +------------------------------------+----------+---------------+----------+ ! Dist Femoral                        !No        !               !          ! +------------------------------------+----------+---------------+----------+ ! Popliteal                           !Yes       ! Yes            ! None      ! +------------------------------------+----------+---------------+----------+ ! Sapheno Femoral Junction            ! Yes       ! Yes            ! None      ! +------------------------------------+----------+---------------+----------+ ! PTV                                 ! Partial   !Yes            ! None      ! +------------------------------------+----------+---------------+----------+ ! Peroneal                            !Partial !None      ! +------------------------------------+----------+---------------+----------+ ! Prox Femoral                        !Yes       ! Yes            ! None      ! +------------------------------------+----------+---------------+----------+ ! Mid Femoral                         !Yes       ! Yes            ! None      ! +------------------------------------+----------+---------------+----------+ ! Dist Femoral                        !No        !               !          ! +------------------------------------+----------+---------------+----------+ ! Popliteal                           !Yes       ! Yes            ! None      ! +------------------------------------+----------+---------------+----------+ ! Sapheno Femoral Junction            ! Yes       ! Yes            ! None      ! +------------------------------------+----------+---------------+----------+ ! PTV                                 ! Partial   !Yes            ! None      ! +------------------------------------+----------+---------------+----------+ ! Peroneal                            !Partial   !Yes            ! None      ! +------------------------------------+----------+---------------+----------+ ! Gastroc                             ! Yes       ! Yes            ! None      ! +------------------------------------+----------+---------------+----------+ ! GSV Thigh                           ! Yes       ! Yes            ! None      ! +------------------------------------+----------+---------------+----------+ ! GSV Knee                            ! Yes       ! Yes            ! None      ! +------------------------------------+----------+---------------+----------+ ! GSV Ankle                           ! Yes       ! Yes            ! None      ! +------------------------------------+----------+---------------+----------+ ! SSV                                 ! Yes       ! Yes            ! None      ! +------------------------------------+----------+---------------+----------+ Left Doppler Measurements +---------------------------+------+------+--------------------------------+ ! Location                   ! Signal!Reflux! Reflux (msec)                   ! +---------------------------+------+------+--------------------------------+ ! Common Femoral             !Phasic! No    !                                ! +---------------------------+------+------+--------------------------------+ ! Prox Femoral               !Phasic! No    !                                ! +---------------------------+------+------+--------------------------------+ ! Popliteal                  !Phasic! No    !                                ! +---------------------------+------+------+--------------------------------+    Ct Cystogram W Wo Contrast    Result Date: 3/11/2021  EXAMINATION: CT CYSTOGRAM 3/10/2021 5:08 pm TECHNIQUE: CT of the pelvis was performed before and after the administration of contrast into the bladder. Multiplanar reformatted images are provided for review. Dose modulation, iterative reconstruction, and/or weight based adjustment of the mA/kV was utilized to reduce the radiation dose to as low as reasonably achievable. COMPARISON: March 9, 2021 HISTORY: ORDERING SYSTEM PROVIDED HISTORY: urethral fistula TECHNOLOGIST PROVIDED HISTORY: urethral fistula Reason for Exam: R/O uretheral fistula. Pt had bowel resection 2/25/21 Acuity: Acute Type of Exam: Initial FINDINGS: There is no extraluminal contrast leakage after installation of contrast into the urinary bladder through the Ornelas catheter. The recently described low-attenuation pelvic collection is stable in size and configuration and demonstrates no increased high attenuation internal contents to indicate contrast spillage from the urinary bladder post-contrast installation. Stable mild postoperative fat infiltration in the anterior lower pelvic mesentery/omental fat.  Ostomy exits the left lower quadrant with stable mild postoperative superficial regional fluid collection. No evidence of urinary bladder injury or perforation. Given that the installation of contrast was via Ornelas catheter situated in the urinary bladder, the presence of urethral fistula cannot be assessed in this exam since the Ornelas catheter bypasses the urethra. 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 The patient has multiple medical co-morbidities including asthma/COPD, CHF, morbid obesity, HTN, CKD and required admission post op for supportive care, awaiting return of bowel function and monitoring her status closely post op. Post op she did develop acute on chronic hypoxic respiratory insufficiency and pulmonology was consulted. Patient's pulm status improved over time with incentive spirometry/pulmonary toilet and nocturnal oxygen. The patient also developed a leukocytosis which was significantly elevated and ultimately ID was consulted and broadened her antibiotic coverage with Zosyn/Vanco. Ultimately, her WBC slightly came down to the 14-16k range but never fully reduced. She continued to improve and was without fevers or concerns for infection. CT was performed which ruled out intra-abdominal process and there was no PE. Finally cardiology had been consulted due to post op Atrial fibrillation and ultimately she was controlled on amiodarone, lopressor and anticoagulated with coumadin with plans for their office to manage post discharge as well. By3/12 the patient was stable for discharge to her SNF and all follow up plans arranged. DISCHARGE INSTRUCTIONS     See pre-printed instructions in chart and given to patient upon discharge.     Discharge Medications:    [facility]   Home Medication Instructions U:[id number]    Printed on:03/21/21 2300   Medication Information                      acetaminophen (TYLENOL) 650 MG extended release tablet  Take 650 mg by mouth every 8 hours as needed              albuterol sulfate  (90 Base) MCG/ACT inhaler  Inhale 2 puffs into the lungs every 4 hours as needed              amiodarone (PACERONE) 100 MG tablet  Take 1 tablet by mouth daily             ascorbic acid (VITAMIN C) 1000 MG tablet  Take 1,000 mg by mouth every evening              aspirin 325 MG tablet  Take 325 mg by mouth every evening              atorvastatin (LIPITOR) 20 MG tablet  Take 10 mg by mouth every evening Takes 1/2 tab (=10mg) nightly             Azelastine-Fluticasone 137-50 MCG/ACT SUSP  1 spray by Nasal route 2 times daily as needed (allergies)              buPROPion (WELLBUTRIN SR) 150 MG extended release tablet  Take 150 mg by mouth 2 times daily              Calcium Carbonate (CALCIUM 600 PO)  Take 600 mg by mouth daily             calcium carbonate-vitamin D (CALTRATE) 600-400 MG-UNIT TABS per tab  Take 1 tablet by mouth daily              cetirizine (ZYRTEC) 10 MG tablet  Take 10 mg by mouth every evening              citalopram (CELEXA) 20 MG tablet  Take 10 mg by mouth daily Takes 1/2 tab (=10mg) daily             dextromethorphan-guaiFENesin (MUCINEX DM)  MG per extended release tablet  Take 1 tablet by mouth 2 times daily             famotidine (PEPCID) 20 MG tablet  Take 20 mg by mouth daily as needed (heartburn)             fluticasone (FLONASE) 50 MCG/ACT nasal spray  1 spray by Nasal route 2 times daily as needed (if not using azelastine nasal spray)              fluticasone-salmeterol (ADVAIR) 500-50 MCG/DOSE diskus inhaler  Inhale 1 puff into the lungs 2 times daily              ipratropium-albuterol (DUONEB) 0.5-2.5 (3) MG/3ML SOLN nebulizer solution  Inhale 3 mLs into the lungs every 6 hours as needed              LUTEIN PO  Take 1 capsule by mouth daily              Melatonin 10 MG TABS  Take 10 mg by mouth nightly as needed (sleep)              metoprolol tartrate (LOPRESSOR) 25 MG tablet  Take 25 mg

## 2021-03-25 LAB
CULTURE: NORMAL
CULTURE: NORMAL
Lab: NORMAL
Lab: NORMAL
SPECIMEN DESCRIPTION: NORMAL
SPECIMEN DESCRIPTION: NORMAL

## 2021-04-08 PROBLEM — N39.0 URINARY TRACT INFECTION: Status: RESOLVED | Noted: 2021-03-09 | Resolved: 2021-04-08

## 2023-12-05 ENCOUNTER — HOSPITAL ENCOUNTER (OUTPATIENT)
Age: 78
Setting detail: SPECIMEN
Discharge: HOME | End: 2023-12-05

## 2023-12-05 LAB
ALBUMIN SERPL-MCNC: 3.1 G/DL (ref 3.5–5.2)
ALP SERPL-CCNC: 88 U/L (ref 35–104)
ALT SERPL-CCNC: 16 U/L (ref 5–33)
ANION GAP SERPL CALCULATED.3IONS-SCNC: 14 MMOL/L (ref 9–17)
AST SERPL-CCNC: 20 U/L
BILIRUB SERPL-MCNC: 0.3 MG/DL (ref 0.3–1.2)
BUN SERPL-MCNC: 20 MG/DL (ref 8–23)
BUN/CREAT SERPL: 20 (ref 9–20)
CALCIUM SERPL-MCNC: 9.1 MG/DL (ref 8.6–10.4)
CHLORIDE SERPL-SCNC: 98 MMOL/L (ref 98–107)
CO2 SERPL-SCNC: 26 MMOL/L (ref 20–31)
CREAT SERPL-MCNC: 1 MG/DL (ref 0.5–0.9)
ERYTHROCYTE [DISTWIDTH] IN BLOOD BY AUTOMATED COUNT: 14.2 % (ref 11.8–14.4)
GFR SERPL CREATININE-BSD FRML MDRD: 57 ML/MIN/1.73M2
GLUCOSE SERPL-MCNC: 95 MG/DL (ref 70–99)
HCT VFR BLD AUTO: 36.3 % (ref 36.3–47.1)
HGB BLD-MCNC: 11.3 G/DL (ref 11.9–15.1)
INR PPP: 1.7
MCH RBC QN AUTO: 30.2 PG (ref 25.2–33.5)
MCHC RBC AUTO-ENTMCNC: 31.1 G/DL (ref 28.4–34.8)
MCV RBC AUTO: 97.1 FL (ref 82.6–102.9)
NRBC BLD-RTO: 0 PER 100 WBC
PLATELET # BLD AUTO: 246 K/UL (ref 138–453)
PMV BLD AUTO: 10.7 FL (ref 8.1–13.5)
POTASSIUM SERPL-SCNC: 4.3 MMOL/L (ref 3.7–5.3)
PROT SERPL-MCNC: 6.6 G/DL (ref 6.4–8.3)
PROTHROMBIN TIME: 19.9 SEC (ref 11.5–14.2)
RBC # BLD AUTO: 3.74 M/UL (ref 3.95–5.11)
SODIUM SERPL-SCNC: 138 MMOL/L (ref 135–144)
WBC OTHER # BLD: 8.6 K/UL (ref 3.5–11.3)

## 2023-12-05 PROCEDURE — 36415 COLL VENOUS BLD VENIPUNCTURE: CPT

## 2023-12-05 PROCEDURE — P9603 ONE-WAY ALLOW PRORATED MILES: HCPCS

## 2023-12-05 PROCEDURE — 85610 PROTHROMBIN TIME: CPT

## 2023-12-05 PROCEDURE — 80053 COMPREHEN METABOLIC PANEL: CPT

## 2023-12-05 PROCEDURE — 85027 COMPLETE CBC AUTOMATED: CPT

## 2023-12-07 ENCOUNTER — HOSPITAL ENCOUNTER (OUTPATIENT)
Age: 78
Setting detail: SPECIMEN
Discharge: HOME | End: 2023-12-07

## 2023-12-07 LAB
INR PPP: 1.8
PROTHROMBIN TIME: 20.2 SEC (ref 11.5–14.2)

## 2023-12-07 PROCEDURE — P9603 ONE-WAY ALLOW PRORATED MILES: HCPCS

## 2023-12-07 PROCEDURE — 85610 PROTHROMBIN TIME: CPT

## 2023-12-07 PROCEDURE — 36415 COLL VENOUS BLD VENIPUNCTURE: CPT

## 2023-12-12 ENCOUNTER — HOSPITAL ENCOUNTER (OUTPATIENT)
Age: 78
Setting detail: SPECIMEN
Discharge: HOME | End: 2023-12-12

## 2023-12-12 LAB
INR PPP: 3.5
PROTHROMBIN TIME: 34.5 SEC (ref 11.5–14.2)

## 2023-12-12 PROCEDURE — P9603 ONE-WAY ALLOW PRORATED MILES: HCPCS

## 2023-12-12 PROCEDURE — 85610 PROTHROMBIN TIME: CPT

## 2023-12-12 PROCEDURE — 36415 COLL VENOUS BLD VENIPUNCTURE: CPT

## 2023-12-13 ENCOUNTER — HOSPITAL ENCOUNTER (OUTPATIENT)
Age: 78
Setting detail: SPECIMEN
Discharge: HOME | End: 2023-12-13

## 2023-12-13 LAB
ALBUMIN SERPL-MCNC: 3.1 G/DL (ref 3.5–5.2)
ALP SERPL-CCNC: 92 U/L (ref 35–104)
ALT SERPL-CCNC: 18 U/L (ref 5–33)
ANION GAP SERPL CALCULATED.3IONS-SCNC: 13 MMOL/L (ref 9–17)
AST SERPL-CCNC: 28 U/L
BASOPHILS # BLD: 0.07 K/UL (ref 0–0.2)
BASOPHILS NFR BLD: 1 % (ref 0–2)
BILIRUB SERPL-MCNC: 0.4 MG/DL (ref 0.3–1.2)
BUN SERPL-MCNC: 18 MG/DL (ref 8–23)
BUN/CREAT SERPL: 18 (ref 9–20)
CALCIUM SERPL-MCNC: 9.3 MG/DL (ref 8.6–10.4)
CHLORIDE SERPL-SCNC: 97 MMOL/L (ref 98–107)
CO2 SERPL-SCNC: 26 MMOL/L (ref 20–31)
CREAT SERPL-MCNC: 1 MG/DL (ref 0.5–0.9)
EOSINOPHIL # BLD: 1.02 K/UL (ref 0–0.44)
EOSINOPHILS RELATIVE PERCENT: 10 % (ref 1–4)
ERYTHROCYTE [DISTWIDTH] IN BLOOD BY AUTOMATED COUNT: 14.6 % (ref 11.8–14.4)
GFR SERPL CREATININE-BSD FRML MDRD: 57 ML/MIN/1.73M2
GLUCOSE SERPL-MCNC: 134 MG/DL (ref 70–99)
HCT VFR BLD AUTO: 38.2 % (ref 36.3–47.1)
HGB BLD-MCNC: 11.6 G/DL (ref 11.9–15.1)
IMM GRANULOCYTES # BLD AUTO: 0.16 K/UL (ref 0–0.3)
IMM GRANULOCYTES NFR BLD: 2 %
LYMPHOCYTES NFR BLD: 0.73 K/UL (ref 1.1–3.7)
LYMPHOCYTES RELATIVE PERCENT: 8 % (ref 24–43)
MCH RBC QN AUTO: 30.3 PG (ref 25.2–33.5)
MCHC RBC AUTO-ENTMCNC: 30.4 G/DL (ref 28.4–34.8)
MCV RBC AUTO: 99.7 FL (ref 82.6–102.9)
MONOCYTES NFR BLD: 0.63 K/UL (ref 0.1–1.2)
MONOCYTES NFR BLD: 6 % (ref 3–12)
NEUTROPHILS NFR BLD: 73 % (ref 36–65)
NEUTS SEG NFR BLD: 7.18 K/UL (ref 1.5–8.1)
NRBC BLD-RTO: 0 PER 100 WBC
PLATELET # BLD AUTO: 290 K/UL (ref 138–453)
PMV BLD AUTO: 10.5 FL (ref 8.1–13.5)
POTASSIUM SERPL-SCNC: 5.1 MMOL/L (ref 3.7–5.3)
PROT SERPL-MCNC: 7.2 G/DL (ref 6.4–8.3)
RBC # BLD AUTO: 3.83 M/UL (ref 3.95–5.11)
RBC # BLD: ABNORMAL 10*6/UL
SODIUM SERPL-SCNC: 136 MMOL/L (ref 135–144)
WBC OTHER # BLD: 9.8 K/UL (ref 3.5–11.3)

## 2023-12-13 PROCEDURE — 80053 COMPREHEN METABOLIC PANEL: CPT

## 2023-12-13 PROCEDURE — 85025 COMPLETE CBC W/AUTO DIFF WBC: CPT

## 2023-12-13 PROCEDURE — P9603 ONE-WAY ALLOW PRORATED MILES: HCPCS

## 2023-12-13 PROCEDURE — 36415 COLL VENOUS BLD VENIPUNCTURE: CPT

## 2023-12-14 ENCOUNTER — HOSPITAL ENCOUNTER (OUTPATIENT)
Age: 78
Setting detail: SPECIMEN
Discharge: HOME | End: 2023-12-14

## 2023-12-14 LAB
INR PPP: 2
PROTHROMBIN TIME: 22.7 SEC (ref 11.5–14.2)

## 2023-12-14 PROCEDURE — 85610 PROTHROMBIN TIME: CPT

## 2023-12-14 PROCEDURE — 36415 COLL VENOUS BLD VENIPUNCTURE: CPT

## 2023-12-14 PROCEDURE — P9603 ONE-WAY ALLOW PRORATED MILES: HCPCS

## 2023-12-15 ENCOUNTER — HOSPITAL ENCOUNTER (OUTPATIENT)
Age: 78
Setting detail: SPECIMEN
Discharge: HOME | End: 2023-12-15

## 2023-12-15 LAB
INR PPP: 1.7
PROTHROMBIN TIME: 20 SEC (ref 11.5–14.2)

## 2023-12-15 PROCEDURE — 85610 PROTHROMBIN TIME: CPT

## 2023-12-15 PROCEDURE — 36415 COLL VENOUS BLD VENIPUNCTURE: CPT

## 2023-12-15 PROCEDURE — P9603 ONE-WAY ALLOW PRORATED MILES: HCPCS

## 2023-12-16 ENCOUNTER — HOSPITAL ENCOUNTER (OUTPATIENT)
Age: 78
Setting detail: SPECIMEN
Discharge: HOME | End: 2023-12-16

## 2023-12-16 LAB
INR PPP: 2
PROTHROMBIN TIME: 22.6 SEC (ref 11.5–14.2)

## 2023-12-16 PROCEDURE — P9603 ONE-WAY ALLOW PRORATED MILES: HCPCS

## 2023-12-16 PROCEDURE — 36415 COLL VENOUS BLD VENIPUNCTURE: CPT

## 2023-12-16 PROCEDURE — 85610 PROTHROMBIN TIME: CPT

## (undated) DEVICE — BLADE ES L6IN ELASTOMERIC COAT EXT DURABLE BEND UPTO 90DEG

## (undated) DEVICE — TUBING, SUCTION, 1/4" X 12', STRAIGHT: Brand: MEDLINE

## (undated) DEVICE — SURGICAL PROCEDURE KIT BASIN MAJ SET UP

## (undated) DEVICE — CATHETER,URETHRAL,REDRUBBER,STRL,16FR: Brand: MEDLINE

## (undated) DEVICE — GARMENT,MEDLINE,DVT,INT,CALF,MED, GEN2: Brand: MEDLINE

## (undated) DEVICE — TOWEL,OR,DSP,ST,BLUE,DLX,XR,4/PK,20PK/CS: Brand: MEDLINE

## (undated) DEVICE — SUTURE ETHBND EXCEL SZ 0 L30IN NONABSORBABLE GRN CT1 L36MM X424H

## (undated) DEVICE — SHEET, T, LAPAROTOMY, STERILE: Brand: MEDLINE

## (undated) DEVICE — SUTURE PERMAHAND SZ 4-0 L18IN NONABSORBABLE BLK SILK BRAID A183H

## (undated) DEVICE — COVER,TABLE,HEAVY DUTY,50"X90",STRL: Brand: MEDLINE

## (undated) DEVICE — SUTURE ETHIB EXCL BR SUTUPAK 4-0 30 X303H

## (undated) DEVICE — PACK PROCEDURE SURG FACILITY SPEC GI BWL SPT SVMMC

## (undated) DEVICE — GOWN,SIRUS,NON REINFRCD,LARGE,SET IN SL: Brand: MEDLINE

## (undated) DEVICE — SUTURE PERMA-HAND SZ 2-0 L30IN NONABSORBABLE BLK L26MM SH K833H

## (undated) DEVICE — PAD,NON-ADHERENT,3X8,STERILE,LF,1/PK: Brand: MEDLINE

## (undated) DEVICE — SUTURE VCRL SZ 2-0 L36IN ABSRB UD L36MM CT-1 1/2 CIR J945H

## (undated) DEVICE — DRAPE IRRIG FLD WRM W44XL44IN W/ AORN STD PRTBL INTRATEMP

## (undated) DEVICE — SUTURE PERMAHAND SZ 3-0 L30IN NONABSORBABLE BLK SILK BRAID A304H

## (undated) DEVICE — SUTURE PDS II SZ 0 L36IN ABSRB VLT L40MM CT 1/2 CIR Z358T

## (undated) DEVICE — GAUZE,SPONGE,4"X4",16PLY,XRAY,STRL,LF: Brand: MEDLINE

## (undated) DEVICE — WOUND RETRACTOR AND PROTECTOR: Brand: ALEXIS O WOUND PROTECTOR-RETRACTOR

## (undated) DEVICE — TOTAL TRAY, DB, 100% SILI FOLEY, 16FR 10: Brand: MEDLINE

## (undated) DEVICE — GLOVE SURG SZ 75 CRM LTX FREE POLYISOPRENE POLYMER BEAD ANTI

## (undated) DEVICE — SUTURE PDS II SZ 0 L60IN ABSRB VLT L65MM TP-1 1/2 CIR Z991G

## (undated) DEVICE — SUTURE PERMAHAND SZ 4-0 L18IN NONABSORBABLE BLK L26MM SH C014D

## (undated) DEVICE — Z DUPLICATE USE 2716240 STAPLER INT CUT LN L40MM STPL L51MM GRN CRV HD B FRM

## (undated) DEVICE — SUTURE PERMAHAND SZ 2-0 L30IN NONABSORBABLE BLK SILK W/O A305H

## (undated) DEVICE — Device

## (undated) DEVICE — SOLUTION IV IRRIG POUR BRL 0.9% SODIUM CHL 2F7124

## (undated) DEVICE — SUTURE VCRL SZ 4-0 L27IN ABSRB UD L26MM SH 1/2 CIR J415H

## (undated) DEVICE — SEALER ENDOSCP NANO COAT OPN DIV CRV L JAW LIGASURE IMPACT

## (undated) DEVICE — BLANKET WRM W29.9XL79.1IN UP BODY FORC AIR MISTRAL-AIR

## (undated) DEVICE — YANKAUER,POOLE TIP,STERILE,50/CS: Brand: MEDLINE

## (undated) DEVICE — COVER LT HNDL BLU PLAS

## (undated) DEVICE — Z DISCONTINUED USE 2716238 PER MEDLINE STAPLER INT L40MM DIA4.7MM GRN CRV HD B FRM TECHNOLOGY DISP

## (undated) DEVICE — SUTURE PROL SZ 4-0 L30IN NONABSORBABLE BLU SH L26MM 1/2 CIR 8831H

## (undated) DEVICE — JELLY, LUBE, STERILE, FOIL PACK, 5G: Brand: MEDLINE

## (undated) DEVICE — GOWN,SIRUS,NONRNF,SETINSLV,XL,20/CS: Brand: MEDLINE

## (undated) DEVICE — 3M™ IOBAN™ 2 ANTIMICROBIAL INCISE DRAPE 6650EZ: Brand: IOBAN™ 2

## (undated) DEVICE — SPONGE LAP W18XL18IN WHT COT 4 PLY FLD STRUNG RADPQ DISP ST